# Patient Record
Sex: FEMALE | Race: ASIAN | NOT HISPANIC OR LATINO
[De-identification: names, ages, dates, MRNs, and addresses within clinical notes are randomized per-mention and may not be internally consistent; named-entity substitution may affect disease eponyms.]

---

## 2019-04-24 ENCOUNTER — APPOINTMENT (OUTPATIENT)
Dept: VASCULAR SURGERY | Facility: CLINIC | Age: 74
End: 2019-04-24
Payer: MEDICARE

## 2019-04-24 VITALS
TEMPERATURE: 97.9 F | DIASTOLIC BLOOD PRESSURE: 78 MMHG | WEIGHT: 188 LBS | HEART RATE: 76 BPM | HEIGHT: 66 IN | SYSTOLIC BLOOD PRESSURE: 165 MMHG | BODY MASS INDEX: 30.22 KG/M2

## 2019-04-24 DIAGNOSIS — Z82.49 FAMILY HISTORY OF ISCHEMIC HEART DISEASE AND OTHER DISEASES OF THE CIRCULATORY SYSTEM: ICD-10-CM

## 2019-04-24 DIAGNOSIS — Z87.09 PERSONAL HISTORY OF OTHER DISEASES OF THE RESPIRATORY SYSTEM: ICD-10-CM

## 2019-04-24 DIAGNOSIS — I83.90 ASYMPTOMATIC VARICOSE VEINS OF UNSPECIFIED LOWER EXTREMITY: ICD-10-CM

## 2019-04-24 DIAGNOSIS — Z86.39 PERSONAL HISTORY OF OTHER ENDOCRINE, NUTRITIONAL AND METABOLIC DISEASE: ICD-10-CM

## 2019-04-24 DIAGNOSIS — Z87.19 PERSONAL HISTORY OF OTHER DISEASES OF THE DIGESTIVE SYSTEM: ICD-10-CM

## 2019-04-24 DIAGNOSIS — Z86.79 PERSONAL HISTORY OF OTHER DISEASES OF THE CIRCULATORY SYSTEM: ICD-10-CM

## 2019-04-24 PROCEDURE — 99203 OFFICE O/P NEW LOW 30 MIN: CPT

## 2019-04-24 PROCEDURE — 93970 EXTREMITY STUDY: CPT

## 2019-04-24 RX ORDER — MOMETASONE FUROATE 1 MG/G
0.1 CREAM TOPICAL
Qty: 45 | Refills: 0 | Status: ACTIVE | COMMUNITY
Start: 2018-12-27

## 2019-04-24 RX ORDER — MIRTAZAPINE 15 MG/1
15 TABLET, FILM COATED ORAL
Qty: 30 | Refills: 0 | Status: ACTIVE | COMMUNITY
Start: 2018-12-27

## 2019-04-24 RX ORDER — DIPHENHYDRAMINE HYDROCHLORIDE 25 MG/1
25 CAPSULE ORAL
Qty: 30 | Refills: 0 | Status: ACTIVE | COMMUNITY
Start: 2018-12-27

## 2019-04-24 RX ORDER — NIFEDIPINE 90 MG/1
90 TABLET, EXTENDED RELEASE ORAL
Qty: 30 | Refills: 0 | Status: ACTIVE | COMMUNITY
Start: 2018-11-14

## 2019-04-24 RX ORDER — POLYVINYL ALCOHOL 14 MG/ML
1.4 SOLUTION/ DROPS OPHTHALMIC
Qty: 15 | Refills: 0 | Status: ACTIVE | COMMUNITY
Start: 2018-11-26

## 2019-04-24 RX ORDER — MULTIVITAMIN WITH FOLIC ACID 400 MCG
TABLET ORAL
Qty: 30 | Refills: 0 | Status: ACTIVE | COMMUNITY
Start: 2018-11-14

## 2019-04-24 RX ORDER — HYDROCORTISONE 1 %
12 CREAM (GRAM) TOPICAL
Qty: 400 | Refills: 0 | Status: ACTIVE | COMMUNITY
Start: 2018-11-27

## 2019-04-24 RX ORDER — DOCUSATE SODIUM 100 MG/1
100 CAPSULE ORAL
Qty: 270 | Refills: 0 | Status: ACTIVE | COMMUNITY
Start: 2018-11-27

## 2019-04-24 RX ORDER — MONTELUKAST 10 MG/1
10 TABLET, FILM COATED ORAL
Qty: 30 | Refills: 0 | Status: ACTIVE | COMMUNITY
Start: 2018-11-14

## 2019-04-24 RX ORDER — POTASSIUM CHLORIDE 750 MG/1
10 TABLET, EXTENDED RELEASE ORAL
Qty: 24 | Refills: 0 | Status: ACTIVE | COMMUNITY
Start: 2019-01-09

## 2019-04-24 RX ORDER — MELOXICAM 7.5 MG/1
7.5 TABLET ORAL
Qty: 30 | Refills: 0 | Status: ACTIVE | COMMUNITY
Start: 2019-01-10

## 2019-04-24 RX ORDER — MECLIZINE HYDROCHLORIDE 12.5 MG/1
12.5 TABLET ORAL
Qty: 30 | Refills: 0 | Status: ACTIVE | COMMUNITY
Start: 2018-12-27

## 2019-04-24 RX ORDER — LOSARTAN POTASSIUM 25 MG/1
25 TABLET, FILM COATED ORAL
Qty: 30 | Refills: 0 | Status: ACTIVE | COMMUNITY
Start: 2018-11-14

## 2019-04-24 RX ORDER — RISPERIDONE 0.5 MG/1
0.5 TABLET, FILM COATED ORAL
Qty: 30 | Refills: 0 | Status: ACTIVE | COMMUNITY
Start: 2018-11-27

## 2019-04-24 RX ORDER — ACETAMINOPHEN 500 MG/1
500 TABLET, FILM COATED ORAL
Qty: 100 | Refills: 0 | Status: ACTIVE | COMMUNITY
Start: 2018-11-14

## 2019-04-24 RX ORDER — FLUTICASONE PROPIONATE 44 UG/1
44 AEROSOL, METERED RESPIRATORY (INHALATION)
Qty: 11 | Refills: 0 | Status: ACTIVE | COMMUNITY
Start: 2019-01-16

## 2019-04-24 RX ORDER — ATORVASTATIN CALCIUM 10 MG/1
10 TABLET, FILM COATED ORAL
Qty: 30 | Refills: 0 | Status: ACTIVE | COMMUNITY
Start: 2018-11-14

## 2019-04-24 RX ORDER — RANITIDINE 150 MG/1
150 TABLET ORAL
Qty: 180 | Refills: 0 | Status: ACTIVE | COMMUNITY
Start: 2019-01-09

## 2019-04-24 RX ORDER — LORATADINE 10 MG/1
10 TABLET ORAL
Qty: 30 | Refills: 0 | Status: ACTIVE | COMMUNITY
Start: 2018-11-14

## 2019-04-24 RX ORDER — PAROXETINE HYDROCHLORIDE 10 MG/1
10 TABLET, FILM COATED ORAL
Qty: 30 | Refills: 0 | Status: ACTIVE | COMMUNITY
Start: 2018-11-27

## 2019-04-24 RX ORDER — ALBUTEROL SULFATE 90 UG/1
108 (90 BASE) AEROSOL, METERED RESPIRATORY (INHALATION)
Qty: 18 | Refills: 0 | Status: ACTIVE | COMMUNITY
Start: 2018-11-27

## 2019-04-24 NOTE — ASSESSMENT
[FreeTextEntry1] : bl LE pain \par no sign or symptoms of PVD\par pt. has mild venous insufficiency but her symptoms are not consistent with venous insufficiency.\par pt. was referred to orthopedic for further eval \par LE elevation and compression with 20-30mmHG compression stockings for VI\par no need for ablation at this time

## 2019-04-24 NOTE — HISTORY OF PRESENT ILLNESS
[FreeTextEntry1] : 74yo F presents c/o bl LE pain from hips down. Pain is worse with walking. When she gets up from standing she has hard time to start walking because of pain. Denies calf claudication or rest pain. No swelling. Pt. has h/o bl venous ablation.

## 2019-04-24 NOTE — PHYSICAL EXAM
[JVD] : no jugular venous distention  [2+] : left 2+ [Ankle Swelling Bilaterally] : bilaterally  [Ankle Swelling (On Exam)] : not present [Varicose Veins Of Lower Extremities] : bilaterally [Ankle Swelling On The Left] : moderate [] : not present [Abdomen Tenderness] : ~T ~M No abdominal tenderness [Alert] : alert [No Rash or Lesion] : No rash or lesion [Oriented to Time] : oriented to time [Oriented to Person] : oriented to person [Oriented to Place] : oriented to place [Calm] : calm [de-identified] : NAD

## 2019-04-24 NOTE — REASON FOR VISIT
[Consultation] : a consultation visit [Family Member] : family member [FreeTextEntry1] : bl LE pain

## 2019-05-09 ENCOUNTER — APPOINTMENT (OUTPATIENT)
Dept: ORTHOPEDIC SURGERY | Facility: CLINIC | Age: 74
End: 2019-05-09
Payer: MEDICARE

## 2019-05-09 VITALS
DIASTOLIC BLOOD PRESSURE: 77 MMHG | WEIGHT: 192 LBS | HEIGHT: 66 IN | BODY MASS INDEX: 30.86 KG/M2 | HEART RATE: 78 BPM | SYSTOLIC BLOOD PRESSURE: 135 MMHG

## 2019-05-09 DIAGNOSIS — M54.5 LOW BACK PAIN: ICD-10-CM

## 2019-05-09 DIAGNOSIS — M47.816 SPONDYLOSIS W/OUT MYELOPATHY OR RADICULOPATHY, LUMBAR REGION: ICD-10-CM

## 2019-05-09 PROCEDURE — 73562 X-RAY EXAM OF KNEE 3: CPT | Mod: 50

## 2019-05-09 PROCEDURE — 72170 X-RAY EXAM OF PELVIS: CPT

## 2019-05-09 PROCEDURE — 99204 OFFICE O/P NEW MOD 45 MIN: CPT

## 2019-05-09 PROCEDURE — 72100 X-RAY EXAM L-S SPINE 2/3 VWS: CPT

## 2019-05-09 NOTE — REVIEW OF SYSTEMS
[Joint Pain] : joint pain [Joint Stiffness] : joint stiffness [Arthralgia] : arthralgia [Joint Swelling] : joint swelling [Negative] : Heme/Lymph

## 2019-05-09 NOTE — CONSULT LETTER
[Dear  ___] : Dear  [unfilled], [Consult Letter:] : I had the pleasure of evaluating your patient, [unfilled]. [Please see my note below.] : Please see my note below. [Consult Closing:] : Thank you very much for allowing me to participate in the care of this patient.  If you have any questions, please do not hesitate to contact me. [Sincerely,] : Sincerely, [FreeTextEntry3] : Pola Larsen MD\par \par ______________________________________________\par [facility]: Hip/Knee Arthroplasty\par [address] [facility], [facility] [address], [place] [address]\par (t) [phone]\par (f) [phone]  [FreeTextEntry2] : KONRAD ROBLES \par

## 2019-05-09 NOTE — HISTORY OF PRESENT ILLNESS
[7] : a current pain level of 7/10 [de-identified] : Ms. DAPHNE JORGENSEN is a 73 year old female presenting with bilateral knee pain, now worsening. She localizes the pain to the medial and anterior aspect of the bilateral knee. The patient describes the pain as sharp, and states it is intermittent, based on activity. She states the pain is exacerbated by walking long distance, climbing/descending stairs, and rising from a seated position. She has been taking NSAIDs for pain with only mild temporary relief. She admits to prior conservative management inclusive of physical therapy with only mild improvement in condition. Patient had cortisone injections years ago with no improvement. She admits/denies hip or lower back pain. The patient admits to limitations in there quality of life, and is present to discuss options for treatment.

## 2019-05-09 NOTE — PHYSICAL EXAM
[Antalgic] : antalgic [Knee] : patellar 2+ and symmetric bilaterally [LE] : Sensory: Intact in bilateral lower extremities [Ankle] : ankle 2+ and symmetric bilaterally [DP] : dorsalis pedis 2+ and symmetric bilaterally [PT] : posterior tibial 2+ and symmetric bilaterally [de-identified] : On general examination the patient is adequately groomed and nourished. The vital parameters are as recorded. \par There is no lymphedema or diffuse swelling, no varicose veins, no skin warmth/erythema/scars/swelling, no ulcers and no palpable lymph nodes or masses in both lower extremities. Bilateral pedal pulses are well palpable.\par Upper Extremity:\par Both right and left upper extremities are unremarkable in terms of skin rash, lesions, pigmentation, redness, tenderness, swelling, joint instability, abnormal deformity or crepitus. The overall range of motion, sensation, motor tone and strength testing are normal.\par \par Knee Exam:\par The gait is right stiff knee antalgic.\par Knee alignment:            Right 10 degrees varus with mild flexion deformity. \par Left 5 degrees varus with no flexion deformity.\par Both knees demonstrate no scars and the skin has no warmth, erythema, swelling or tenderness. \par Both knees have a range of motion of\par Extension:                    Right -5 degrees                        Left 0 degrees\par Flexion:                                   Right 100 degrees          Left 100 degrees\par Right Knee: There is medial joint line tenderness. There is mild effusion. \par Alena's test is positive. Miah test is positive.\par Lachman's test, Anterior/Posterior Drawer test and Pivot Shift Tests are negative. \par There is right knee grade 1 MCL mediolateral laxity and no anteroposterior instability. \par Patella compression test is negative and patellofemoral tracking is normal with no lateral subluxation, apprehension or instability. \par Right knee quadriceps and hamstrings power is 4+.\par Left knee quadriceps and hamstrings power is 5. \par \par Hip Exam:\par The gait and station is normal\par The patient has equal leg lengths and no pelvic tilt. Timoteo/Jillian test is 7 inches on the right and 7 inches on the left. Active SLR is 60 degrees on the right and 60 degrees on the left. Both hips demonstrate no scars and the skin has no signs of inflammation or tenderness. \par Both Hips have a normal range of motion of flexion to 100 degrees, abduction 40 degrees, adduction 20 degrees, external rotation 40 degrees, internal rotation 20 degrees with symmetrical motion in flexion and extension. There is no flexion contracture, deformity or instability. Labral impingement tests are negative.\par Both hips flexor, abductor and extensor power is normal. [de-identified] : The following radiographs were ordered and read by me during this patients visit. I reviewed each radiograph in detail with the patient and discussed the findings as highlighted below. \par AP, lateral and skyline views of the bilateral knees confirm advanced degenerative joint disease with medial joint space narrowing and osteophyte formation. Varus Deformity. Right worse than left. \par AP view of the pelvis are within normal limits \par AP and lateral views of the lumbar spine reveal DJD with loss of normal lordosis\par

## 2019-05-09 NOTE — DISCUSSION/SUMMARY
[de-identified] : Bilateral Knee advanced degenerative joint disease, right worse than left, Varus Deformity BIlaterally. Lumbar DJD. Varicose Veins \par The natural history and treatment of degenerative arthritis was discussed with the patient at length today. The spectrum of treatment including nonoperative modalities to surgical intervention was elucidated. Noninvasive and nonoperative treatment modalities include weight reduction, activity modification with low impact exercise,  as needed use of acetaminophen or anti-inflammatory medications if tolerated, glucosamine/chondroitin supplements, and physical therapy. Further treatments can include corticosteroid injection and the use of viscosupplementation with hyaluronic acid injections. Definitive surgical treatment can certainly include total joint arthroplasty also. The risks and benefits of each treatment options was discussed and all questions were answered.\par In view of lack of adequate pain relief with conservative (non-surgical) management protocol including physical therapy, home exercises, weight loss, activity modification, NSAIDS; the patient is recommended to consider a Right Total Knee Replacement. \par The risks, benefits, alternatives, implications, complications including but not limited to pain, stiffness, bleeding, limp, wound breakdown, infection, bone fracture, nerve and vascular compromise, implant wear, fixation options depending on bone quality, instability, and durability issues and rehabilitation were discussed and relevant questions were addressed. The possibility of recurrent pain, no improvement in pain and actual worsening of the pain were also mentioned in conversation with the patient. Medical complications related to the patient's general medical health including deep vein thrombosis, pulmonary embolus, heart attack, stroke, death and other complications from anesthesia were discussed as well. The patient wishes to proceed and will undergo preoperative medical evaluation and clearance, attend the preoperative educational class and will schedule surgery appropriately.\par Anticoagulation prophylaxis medication options to address risks of deep vein thrombosis and pulmonary embolism were discussed and weighed against the risks of bleeding and wound healing complications. The patient elected aspirin/coumadin prophylaxis with mechanical modalities. \par I have provided the patient a referral to Dr. Grey for evaluation of PVD with Varicose Veins.

## 2019-06-28 ENCOUNTER — APPOINTMENT (OUTPATIENT)
Dept: VASCULAR SURGERY | Facility: CLINIC | Age: 74
End: 2019-06-28
Payer: MEDICARE

## 2019-06-28 VITALS
HEIGHT: 66 IN | TEMPERATURE: 98.1 F | BODY MASS INDEX: 30.53 KG/M2 | HEART RATE: 2 BPM | DIASTOLIC BLOOD PRESSURE: 84 MMHG | WEIGHT: 190 LBS | SYSTOLIC BLOOD PRESSURE: 145 MMHG

## 2019-06-28 DIAGNOSIS — I87.2 VENOUS INSUFFICIENCY (CHRONIC) (PERIPHERAL): ICD-10-CM

## 2019-06-28 DIAGNOSIS — I83.819 VARICOSE VEINS OF UNSPECIFIED LOWER EXTREMITY WITH PAIN: ICD-10-CM

## 2019-06-28 DIAGNOSIS — I83.899 VARICOSE VEINS OF UNSPECIFIED LOWER EXTREMITY WITH OTHER COMPLICATIONS: ICD-10-CM

## 2019-06-28 DIAGNOSIS — I83.893 VARICOSE VEINS OF BILATERAL LOWER EXTREMITIES WITH OTHER COMPLICATIONS: ICD-10-CM

## 2019-06-28 PROCEDURE — 99214 OFFICE O/P EST MOD 30 MIN: CPT

## 2019-06-28 PROCEDURE — 93970 EXTREMITY STUDY: CPT

## 2019-06-28 RX ORDER — ACETAMINOPHEN 500 MG/1
TABLET ORAL
Refills: 0 | Status: ACTIVE | COMMUNITY

## 2019-06-28 RX ORDER — SOLIFENACIN SUCCINATE 10 MG/1
TABLET ORAL
Refills: 0 | Status: ACTIVE | COMMUNITY

## 2019-06-28 RX ORDER — ESTRADIOL 0.1 MG/G
0.1 CREAM VAGINAL
Qty: 42 | Refills: 0 | Status: ACTIVE | COMMUNITY
Start: 2019-04-03

## 2019-06-28 RX ORDER — SENNOSIDES 8.6 MG/1
CAPSULE, GELATIN COATED ORAL
Refills: 0 | Status: ACTIVE | COMMUNITY

## 2019-06-28 RX ORDER — OFLOXACIN 3 MG/ML
0.3 SOLUTION/ DROPS OPHTHALMIC
Qty: 5 | Refills: 0 | Status: ACTIVE | COMMUNITY
Start: 2019-01-24

## 2019-06-28 RX ORDER — FLUTICASONE PROPIONATE 50 UG/1
50 SPRAY, METERED NASAL
Qty: 48 | Refills: 0 | Status: ACTIVE | COMMUNITY
Start: 2019-03-13

## 2019-06-28 RX ORDER — DOCUSATE SODIUM 100 MG/1
CAPSULE ORAL
Refills: 0 | Status: ACTIVE | COMMUNITY

## 2019-06-28 RX ORDER — RISPERIDONE 1 MG/1
1 TABLET, FILM COATED ORAL
Qty: 30 | Refills: 0 | Status: ACTIVE | COMMUNITY
Start: 2019-02-04

## 2019-06-28 RX ORDER — DICLOFENAC SODIUM 10 MG/G
1 GEL TOPICAL
Qty: 100 | Refills: 0 | Status: ACTIVE | COMMUNITY
Start: 2019-06-12

## 2019-06-28 RX ORDER — PREDNISOLONE ACETATE 10 MG/ML
1 SUSPENSION/ DROPS OPHTHALMIC
Qty: 5 | Refills: 0 | Status: ACTIVE | COMMUNITY
Start: 2019-01-24

## 2019-06-28 RX ORDER — ALBUTEROL 90 MCG
AEROSOL (GRAM) INHALATION
Refills: 0 | Status: ACTIVE | COMMUNITY

## 2019-06-28 RX ORDER — HYDROCORTISONE 25 MG/G
CREAM TOPICAL
Refills: 0 | Status: ACTIVE | COMMUNITY

## 2019-06-28 RX ORDER — PANTOPRAZOLE 40 MG/1
TABLET, DELAYED RELEASE ORAL
Refills: 0 | Status: ACTIVE | COMMUNITY

## 2019-06-28 RX ORDER — CALCIUM CITRATE/VITAMIN D3 315MG-6.25
TABLET ORAL
Refills: 0 | Status: ACTIVE | COMMUNITY

## 2019-06-28 RX ORDER — ASPIRIN 81 MG
81 TABLET, DELAYED RELEASE (ENTERIC COATED) ORAL
Refills: 0 | Status: ACTIVE | COMMUNITY

## 2019-06-28 RX ORDER — BUPROPION HYDROCHLORIDE 150 MG/1
150 TABLET, EXTENDED RELEASE ORAL
Refills: 0 | Status: ACTIVE | COMMUNITY

## 2019-06-28 RX ORDER — NITROFURANTOIN (MONOHYDRATE/MACROCRYSTALS) 25; 75 MG/1; MG/1
100 CAPSULE ORAL
Qty: 30 | Refills: 0 | Status: ACTIVE | COMMUNITY
Start: 2019-02-27

## 2019-06-28 NOTE — ASSESSMENT
[FreeTextEntry1] : Impression  symptomatic venous insuff not yet sono evident and rosalinda knee OA \par \par \par Plan Med Conservative management leg elevation, knee high compression stockings 15-20mm Hg (rx given), wt loss, diet control, exercise program, protective measures\par Indications risks and benefits of Right  LSV  RFAwere explained to pt who understands\par From a vasc standpoint I recommend to proceed w knee ortho intervention and then rto to be re eval 2-3 mo\par if at that time her venous insuff  remain and are affecting her lifestyle then will d/w pt rt lsv rfa\par pt is cleared from vasc standpoint for ortho interv \par ov 2-3mo \par \par \par \par Varicose veins are enlarged, twisted veins. Varicose veins are caused by increased blood pressure in the veins.  The blood moves towards the heart by 1-way valves in the veins. When the valves become weakened or damaged, blood can collect in the veins and pool in your lower legs (ankles). This causes the veins to become enlarged and incompetent with reflux. Sitting or standing for long periods can cause blood to pool in the leg veins, increasing the pressure within the veins. \par Risk factors for varicose veins or venous disease may include:  obesity, older age, standing or sitting for prolonged periods of time for several years, being female, pregnancy, taking oral contraceptive pills or hormone replacement, being inactive, and/or smoking. \par The most common symptoms of varicose veins are sensations in the legs, such as a heavy feeling, burning, and/or aching. However, each individual may experience symptoms differently.  Other symptoms may include:  color changes in the skin, sores on the legs, or rash.  Severe varicose veins or venous disease may eventually produce long-term mild swelling that can result in more serious skin and tissue problems, such as ulcers and non-healing sores.\par Varicose veins and venous disease are diagnosed by a complete medical history, physical examination, and diagnostic studies for varicose veins including duplex ultrasound and color-flow imaging.  \par Medical treatment for varicose veins and venous disease include:  compression stockings, sclerotherapy, endovenous ablation and/or surgical treatment with microphlebectomy.  \par \par  [Arterial/Venous Disease] : arterial/venous disease [Foot care/Footwear] : foot care/footwear [Other: _____] : [unfilled]

## 2019-06-28 NOTE — HISTORY OF PRESENT ILLNESS
[FreeTextEntry1] : pt was referred by Dr JAYE Larsen MD for vasc clearance prior to  knee replacement  \par pt has not decided weather she will proceed w ortho intervention\par pt is partially compliant w comp stockings\par \par Pt c/o bilateral leg pain swelling heaviness and discomfort worse w activity and by the end of the day\par Onset sev years ago \par Intensity moderate \par \par \par Pt c/o bilateral right worse than the left knee pain w activity \par intensity mod to severe w difficulty  getting up and sitting \par \par \par \par

## 2019-06-28 NOTE — PHYSICAL EXAM
[Normal Breath Sounds] : Normal breath sounds [1+] : left 1+ [2+] : left 2+ [Ankle Swelling (On Exam)] : present [Ankle Swelling Bilaterally] : bilaterally  [Varicose Veins Of Lower Extremities] : bilaterally [Ankle Swelling On The Right] : mild [] : bilaterally [Ankle Swelling On The Left] : moderate [No HSM] : no hepatosplenomegaly [No Rash or Lesion] : No rash or lesion [Alert] : alert [Oriented to Person] : oriented to person [Oriented to Place] : oriented to place [Oriented to Time] : oriented to time [Calm] : calm [JVD] : no jugular venous distention  [Right Carotid Bruit] : no bruit heard over the right carotid [Left Carotid Bruit] : no bruit heard over the left carotid [Abdomen Masses] : No abdominal masses [Tender] : was nontender [Stool Sample Taken] : No stool obtained  on rectal exam [de-identified] : nad [de-identified] : wnl [FreeTextEntry1] : Moderate bilateral leg venous insufficiency \par w mild to  moderate bilateral leg stasis dermatitis \par and moderate bilateral leg edema \par Multiple  bilateral leg small varicose  veins and spider veins  ant  thigh and calf and shin \par no wounds/ulcers\par  [de-identified] : wnl [de-identified] : ambulates slowly w gracia  [de-identified] : Horacio Cranial nerves 2-12 horacio grossly intact [de-identified] : cooperative

## 2019-06-28 NOTE — DATA REVIEWED
[FreeTextEntry1] : 3/24/2019 venous Doppler Horacio le no acute dvt svt\par                                  RLE GSV insuff knee level to ankle, LSV insuff spj to ankle\par                                  LLE no sono evidence of reflux \par \par \par 6/28/2019 Venous Doppler Horacio le no acute dvt svt \par                           \par

## 2019-11-21 ENCOUNTER — APPOINTMENT (OUTPATIENT)
Dept: ORTHOPEDIC SURGERY | Facility: CLINIC | Age: 74
End: 2019-11-21
Payer: MEDICARE

## 2019-11-21 VITALS — HEART RATE: 79 BPM | SYSTOLIC BLOOD PRESSURE: 138 MMHG | DIASTOLIC BLOOD PRESSURE: 82 MMHG

## 2019-11-21 PROCEDURE — 73562 X-RAY EXAM OF KNEE 3: CPT | Mod: 50

## 2019-11-21 PROCEDURE — 99214 OFFICE O/P EST MOD 30 MIN: CPT

## 2019-11-21 NOTE — CONSULT LETTER
[Dear  ___] : Dear  [unfilled], [Consult Letter:] : I had the pleasure of evaluating your patient, [unfilled]. [Consult Closing:] : Thank you very much for allowing me to participate in the care of this patient.  If you have any questions, please do not hesitate to contact me. [Sincerely,] : Sincerely, [FreeTextEntry2] : KONRAD ROBLES \par  [FreeTextEntry1] : Bilateral Knee advanced degenerative joint disease, Varus Deformity BIlaterally. Lumbar DJD. Varicose Veins \par The natural history and treatment of degenerative arthritis was discussed with the patient at length today. The spectrum of treatment including nonoperative modalities to surgical intervention was elucidated. Noninvasive and nonoperative treatment modalities include weight reduction, activity modification with low impact exercise,  as needed use of acetaminophen or anti-inflammatory medications if tolerated, glucosamine/chondroitin supplements, and physical therapy. Further treatments can include corticosteroid injection and the use of viscosupplementation with hyaluronic acid injections. Definitive surgical treatment can certainly include total joint arthroplasty also. The risks and benefits of each treatment options was discussed and all questions were answered.\par In view of lack of adequate pain relief with conservative (non-surgical) management protocol including physical therapy, home exercises, weight loss, activity modification, NSAIDS; the patient is recommended to consider a Left Total Knee Replacement. \par The risks, benefits, alternatives, implications, complications including but not limited to pain, stiffness, bleeding, limp, wound breakdown, infection, bone fracture, nerve and vascular compromise, implant wear, fixation options depending on bone quality, instability, and durability issues and rehabilitation were discussed and relevant questions were addressed. The possibility of recurrent pain, no improvement in pain and actual worsening of the pain were also mentioned in conversation with the patient. Medical complications related to the patient's general medical health including deep vein thrombosis, pulmonary embolus, heart attack, stroke, death and other complications from anesthesia were discussed as well. The patient wishes to proceed and will undergo preoperative medical evaluation and clearance, attend the preoperative educational class and will schedule surgery appropriately.\par Anticoagulation prophylaxis medication options to address risks of deep vein thrombosis and pulmonary embolism were discussed and weighed against the risks of bleeding and wound healing complications. The patient elected aspirin/coumadin prophylaxis with mechanical modalities. \par In terms of Varicose Veins, patient cleared for orthopedic intervention by Dr Grey.  [FreeTextEntry3] : Pola Larsen MD\par \par ______________________________________________\par [facility]: Hip/Knee Arthroplasty\par [address] [facility], [facility] [address], [place] [address]\par (t) [phone]\par (f) [phone]

## 2019-11-21 NOTE — DISCUSSION/SUMMARY
[de-identified] : Bilateral Knee advanced degenerative joint disease, Varus Deformity BIlaterally. Lumbar DJD. Varicose Veins \par The natural history and treatment of degenerative arthritis was discussed with the patient at length today. The spectrum of treatment including nonoperative modalities to surgical intervention was elucidated. Noninvasive and nonoperative treatment modalities include weight reduction, activity modification with low impact exercise,  as needed use of acetaminophen or anti-inflammatory medications if tolerated, glucosamine/chondroitin supplements, and physical therapy. Further treatments can include corticosteroid injection and the use of viscosupplementation with hyaluronic acid injections. Definitive surgical treatment can certainly include total joint arthroplasty also. The risks and benefits of each treatment options was discussed and all questions were answered.\par In view of lack of adequate pain relief with conservative (non-surgical) management protocol including physical therapy, home exercises, weight loss, activity modification, NSAIDS; the patient is recommended to consider a Left Total Knee Replacement. \par The risks, benefits, alternatives, implications, complications including but not limited to pain, stiffness, bleeding, limp, wound breakdown, infection, bone fracture, nerve and vascular compromise, implant wear, fixation options depending on bone quality, instability, and durability issues and rehabilitation were discussed and relevant questions were addressed. The possibility of recurrent pain, no improvement in pain and actual worsening of the pain were also mentioned in conversation with the patient. Medical complications related to the patient's general medical health including deep vein thrombosis, pulmonary embolus, heart attack, stroke, death and other complications from anesthesia were discussed as well. The patient wishes to proceed and will undergo preoperative medical evaluation and clearance, attend the preoperative educational class and will schedule surgery appropriately.\par Anticoagulation prophylaxis medication options to address risks of deep vein thrombosis and pulmonary embolism were discussed and weighed against the risks of bleeding and wound healing complications. The patient elected aspirin/coumadin prophylaxis with mechanical modalities. \par In terms of Varicose Veins, patient cleared for orthopedic intervention by Dr Grey.

## 2019-11-21 NOTE — HISTORY OF PRESENT ILLNESS
[8] : an average pain level of 8/10 [de-identified] : Ms. DAPHNE JORGENSEN is a 73 year old female presenting for follow up of bilateral Knee advanced degenerative joint disease. She localizes the pain to the medial and anterior aspect of the bilateral knee. The patient describes the pain as sharp, and states it is intermittent, based on activity. She states the pain is exacerbated by walking long distance, climbing/descending stairs, and rising from a seated position. She has been taking NSAIDs for pain with only mild temporary relief. She admits to prior conservative management inclusive of physical therapy with only mild improvement in condition. Patient had cortisone injections years ago with no improvement. She denies hip or lower back pain. The patient admits to limitations in there quality of life, and is present to discuss total knee replacement. JTM \par Patient admits she is minimally ambulatory with a walker, but often uses a wheel chair. \par Patient was evaluated by Dr Grey for Varicose Veins and was cleared for surgery.

## 2019-11-21 NOTE — PHYSICAL EXAM
[Antalgic] : antalgic [LE] : Sensory: Intact in bilateral lower extremities [Knee] : patellar 2+ and symmetric bilaterally [Ankle] : ankle 2+ and symmetric bilaterally [DP] : dorsalis pedis 2+ and symmetric bilaterally [PT] : posterior tibial 2+ and symmetric bilaterally [de-identified] : On general examination the patient is adequately groomed and nourished. The vital parameters are as recorded. \par There is no lymphedema or diffuse swelling, no varicose veins, no skin warmth/erythema/scars/swelling, no ulcers and no palpable lymph nodes or masses in both lower extremities. Bilateral pedal pulses are well palpable.\par Upper Extremity:\par Both right and left upper extremities are unremarkable in terms of skin rash, lesions, pigmentation, redness, tenderness, swelling, joint instability, abnormal deformity or crepitus. The overall range of motion, sensation, motor tone and strength testing are normal.\par \par Knee Exam:\par The gait is right stiff knee antalgic.\par Knee alignment:            Right 10 degrees varus with mild flexion deformity. \par Left 10 degrees varus with no flexion deformity.\par Both knees demonstrate no scars and the skin has no warmth, erythema, swelling or tenderness. \par Both knees have a range of motion of\par Extension:                    Right -5 degrees                        Left 0 degrees\par Flexion:                                   Right 100 degrees          Left 90 degrees\par Right Knee: There is medial joint line tenderness. There is mild effusion. \par Alena's test is positive. Miah test is positive.\par Lachman's test, Anterior/Posterior Drawer test and Pivot Shift Tests are negative. \par There is right knee grade 1 MCL mediolateral laxity and no anteroposterior instability. \par Patella compression test is negative and patellofemoral tracking is normal with no lateral subluxation, apprehension or instability. \par Right knee quadriceps and hamstrings power is 4+.\par Left knee quadriceps and hamstrings power is 4+ \par \par Hip Exam:\par The gait and station is normal\par The patient has equal leg lengths and no pelvic tilt. Timoteo/Jillian test is 7 inches on the right and 7 inches on the left. Active SLR is 60 degrees on the right and 60 degrees on the left. Both hips demonstrate no scars and the skin has no signs of inflammation or tenderness. \par Both Hips have a normal range of motion of flexion to 100 degrees, abduction 40 degrees, adduction 20 degrees, external rotation 40 degrees, internal rotation 20 degrees with symmetrical motion in flexion and extension. There is no flexion contracture, deformity or instability. Labral impingement tests are negative.\par Both hips flexor, abductor and extensor power is normal. [de-identified] : The following radiographs were ordered and read by me during this patients visit. I reviewed each radiograph in detail with the patient and discussed the findings as highlighted below. \par AP, lateral and skyline views of the bilateral knees confirm advanced degenerative joint disease with medial joint space narrowing and osteophyte formation. Varus Deformity. \par Previous Xrays: \par AP view of the pelvis are within normal limits \par AP and lateral views of the lumbar spine reveal DJD with loss of normal lordosis\par

## 2019-12-31 ENCOUNTER — OUTPATIENT (OUTPATIENT)
Dept: OUTPATIENT SERVICES | Facility: HOSPITAL | Age: 74
LOS: 1 days | End: 2019-12-31

## 2019-12-31 VITALS
DIASTOLIC BLOOD PRESSURE: 78 MMHG | HEIGHT: 63.75 IN | WEIGHT: 188.05 LBS | RESPIRATION RATE: 16 BRPM | OXYGEN SATURATION: 98 % | TEMPERATURE: 97 F | SYSTOLIC BLOOD PRESSURE: 128 MMHG | HEART RATE: 83 BPM

## 2019-12-31 DIAGNOSIS — Z98.51 TUBAL LIGATION STATUS: Chronic | ICD-10-CM

## 2019-12-31 DIAGNOSIS — M17.12 UNILATERAL PRIMARY OSTEOARTHRITIS, LEFT KNEE: ICD-10-CM

## 2019-12-31 LAB
ALBUMIN SERPL ELPH-MCNC: 4.8 G/DL — SIGNIFICANT CHANGE UP (ref 3.3–5)
ALP SERPL-CCNC: 43 U/L — SIGNIFICANT CHANGE UP (ref 40–120)
ALT FLD-CCNC: 17 U/L — SIGNIFICANT CHANGE UP (ref 4–33)
ANION GAP SERPL CALC-SCNC: 15 MMO/L — HIGH (ref 7–14)
APPEARANCE UR: CLEAR — SIGNIFICANT CHANGE UP
AST SERPL-CCNC: 21 U/L — SIGNIFICANT CHANGE UP (ref 4–32)
BASOPHILS # BLD AUTO: 0.02 K/UL — SIGNIFICANT CHANGE UP (ref 0–0.2)
BASOPHILS NFR BLD AUTO: 0.3 % — SIGNIFICANT CHANGE UP (ref 0–2)
BILIRUB DIRECT SERPL-MCNC: < 0.2 MG/DL — SIGNIFICANT CHANGE UP (ref 0.1–0.2)
BILIRUB SERPL-MCNC: 0.4 MG/DL — SIGNIFICANT CHANGE UP (ref 0.2–1.2)
BILIRUB UR-MCNC: NEGATIVE — SIGNIFICANT CHANGE UP
BLD GP AB SCN SERPL QL: NEGATIVE — SIGNIFICANT CHANGE UP
BLOOD UR QL VISUAL: NEGATIVE — SIGNIFICANT CHANGE UP
BUN SERPL-MCNC: 24 MG/DL — HIGH (ref 7–23)
CALCIUM SERPL-MCNC: 10.1 MG/DL — SIGNIFICANT CHANGE UP (ref 8.4–10.5)
CHLORIDE SERPL-SCNC: 100 MMOL/L — SIGNIFICANT CHANGE UP (ref 98–107)
CO2 SERPL-SCNC: 22 MMOL/L — SIGNIFICANT CHANGE UP (ref 22–31)
COLOR SPEC: SIGNIFICANT CHANGE UP
CREAT SERPL-MCNC: 0.57 MG/DL — SIGNIFICANT CHANGE UP (ref 0.5–1.3)
EOSINOPHIL # BLD AUTO: 0.18 K/UL — SIGNIFICANT CHANGE UP (ref 0–0.5)
EOSINOPHIL NFR BLD AUTO: 3.1 % — SIGNIFICANT CHANGE UP (ref 0–6)
GLUCOSE SERPL-MCNC: 127 MG/DL — HIGH (ref 70–99)
GLUCOSE UR-MCNC: NEGATIVE — SIGNIFICANT CHANGE UP
HBA1C BLD-MCNC: 5.9 % — HIGH (ref 4–5.6)
HCT VFR BLD CALC: 37 % — SIGNIFICANT CHANGE UP (ref 34.5–45)
HGB BLD-MCNC: 11.8 G/DL — SIGNIFICANT CHANGE UP (ref 11.5–15.5)
IMM GRANULOCYTES NFR BLD AUTO: 0.3 % — SIGNIFICANT CHANGE UP (ref 0–1.5)
KETONES UR-MCNC: NEGATIVE — SIGNIFICANT CHANGE UP
LEUKOCYTE ESTERASE UR-ACNC: NEGATIVE — SIGNIFICANT CHANGE UP
LYMPHOCYTES # BLD AUTO: 2.92 K/UL — SIGNIFICANT CHANGE UP (ref 1–3.3)
LYMPHOCYTES # BLD AUTO: 51 % — HIGH (ref 13–44)
MCHC RBC-ENTMCNC: 29.2 PG — SIGNIFICANT CHANGE UP (ref 27–34)
MCHC RBC-ENTMCNC: 31.9 % — LOW (ref 32–36)
MCV RBC AUTO: 91.6 FL — SIGNIFICANT CHANGE UP (ref 80–100)
MONOCYTES # BLD AUTO: 0.36 K/UL — SIGNIFICANT CHANGE UP (ref 0–0.9)
MONOCYTES NFR BLD AUTO: 6.3 % — SIGNIFICANT CHANGE UP (ref 2–14)
NEUTROPHILS # BLD AUTO: 2.23 K/UL — SIGNIFICANT CHANGE UP (ref 1.8–7.4)
NEUTROPHILS NFR BLD AUTO: 39 % — LOW (ref 43–77)
NITRITE UR-MCNC: NEGATIVE — SIGNIFICANT CHANGE UP
NRBC # FLD: 0 K/UL — SIGNIFICANT CHANGE UP (ref 0–0)
PH UR: 6.5 — SIGNIFICANT CHANGE UP (ref 5–8)
PLATELET # BLD AUTO: 253 K/UL — SIGNIFICANT CHANGE UP (ref 150–400)
PMV BLD: 9.6 FL — SIGNIFICANT CHANGE UP (ref 7–13)
POTASSIUM SERPL-MCNC: 4.2 MMOL/L — SIGNIFICANT CHANGE UP (ref 3.5–5.3)
POTASSIUM SERPL-SCNC: 4.2 MMOL/L — SIGNIFICANT CHANGE UP (ref 3.5–5.3)
PROT SERPL-MCNC: 7.8 G/DL — SIGNIFICANT CHANGE UP (ref 6–8.3)
PROT UR-MCNC: NEGATIVE — SIGNIFICANT CHANGE UP
RBC # BLD: 4.04 M/UL — SIGNIFICANT CHANGE UP (ref 3.8–5.2)
RBC # FLD: 14 % — SIGNIFICANT CHANGE UP (ref 10.3–14.5)
RH IG SCN BLD-IMP: POSITIVE — SIGNIFICANT CHANGE UP
SODIUM SERPL-SCNC: 137 MMOL/L — SIGNIFICANT CHANGE UP (ref 135–145)
SP GR SPEC: 1.01 — SIGNIFICANT CHANGE UP (ref 1–1.04)
UROBILINOGEN FLD QL: NORMAL — SIGNIFICANT CHANGE UP
WBC # BLD: 5.73 K/UL — SIGNIFICANT CHANGE UP (ref 3.8–10.5)
WBC # FLD AUTO: 5.73 K/UL — SIGNIFICANT CHANGE UP (ref 3.8–10.5)

## 2019-12-31 RX ORDER — MONTELUKAST 4 MG/1
1 TABLET, CHEWABLE ORAL
Qty: 0 | Refills: 0 | DISCHARGE

## 2019-12-31 RX ORDER — ACETAMINOPHEN 500 MG
2 TABLET ORAL
Qty: 0 | Refills: 0 | DISCHARGE

## 2019-12-31 RX ORDER — HYDROCORTISONE 1 %
1 OINTMENT (GRAM) TOPICAL
Qty: 0 | Refills: 0 | DISCHARGE

## 2019-12-31 RX ORDER — SODIUM CHLORIDE 9 MG/ML
3 INJECTION INTRAMUSCULAR; INTRAVENOUS; SUBCUTANEOUS EVERY 8 HOURS
Refills: 0 | Status: DISCONTINUED | OUTPATIENT
Start: 2020-01-15 | End: 2020-01-17

## 2019-12-31 RX ORDER — ALBUTEROL 90 UG/1
2 AEROSOL, METERED ORAL
Qty: 0 | Refills: 0 | DISCHARGE

## 2019-12-31 RX ORDER — MECLIZINE HCL 12.5 MG
1 TABLET ORAL
Qty: 0 | Refills: 0 | DISCHARGE

## 2019-12-31 RX ORDER — CHOLECALCIFEROL (VITAMIN D3) 125 MCG
1 CAPSULE ORAL
Qty: 0 | Refills: 0 | DISCHARGE

## 2019-12-31 RX ORDER — SODIUM CHLORIDE 9 MG/ML
1000 INJECTION, SOLUTION INTRAVENOUS
Refills: 0 | Status: DISCONTINUED | OUTPATIENT
Start: 2020-01-15 | End: 2020-01-15

## 2019-12-31 NOTE — H&P PST ADULT - MUSCULOSKELETAL
details… detailed exam left knee/no joint erythema/no joint warmth/diminished strength/decreased ROM due to pain

## 2019-12-31 NOTE — H&P PST ADULT - HISTORY OF PRESENT ILLNESS
74 year old female with c/o worsening left knee pain x several years. Pt presents today for presurgical evaluation for ... 74 year old female with c/o worsening left knee arthritis and pain x several years. Pt presents today for presurgical evaluation for Left Total Knee Replacement scheduled on 1/15/20.

## 2019-12-31 NOTE — H&P PST ADULT - NSICDXPROBLEM_GEN_ALL_CORE_FT
PROBLEM DIAGNOSES  Problem: Unilateral primary osteoarthritis, left knee  Assessment and Plan: Pt scheduled for surgery on 1/15/20.  Pre-op instructions provided. Pt verbalized understanding.   Pepcid provided for GI prophylaxis.   Pt given detailed verbal and written instructions on chlorhexidine wash. Pt verbalized understanding with teachback.   Pt is going for medical clearance per surgeon's request - requested by PST as well due to low activity tolerance.   Pt instructed to take her Nifedepine and Flonase the morning of surgery.

## 2019-12-31 NOTE — H&P PST ADULT - MS GEN HX ROS MEA POS PC
arthritis/left shoulder, left hip, right knee left shoulder, left hip, right knee, left knee/joint pain/arthritis

## 2019-12-31 NOTE — H&P PST ADULT - NSICDXPASTMEDICALHX_GEN_ALL_CORE_FT
PAST MEDICAL HISTORY:  Asthma     Essential hypertension     H/O gastroesophageal reflux (GERD)     Hyperlipidemia     Vertigo PAST MEDICAL HISTORY:  Asthma     Depression     Essential hypertension     H/O gastroesophageal reflux (GERD)     Hyperlipidemia     Obesity     Vertigo

## 2019-12-31 NOTE — H&P PST ADULT - NSICDXFAMILYHX_GEN_ALL_CORE_FT
FAMILY HISTORY:  Mother  Still living? Unknown  FH: HTN (hypertension), Age at diagnosis: Age Unknown

## 2020-01-01 LAB
BACTERIA UR CULT: SIGNIFICANT CHANGE UP
SPECIMEN SOURCE: SIGNIFICANT CHANGE UP
SPECIMEN SOURCE: SIGNIFICANT CHANGE UP

## 2020-01-02 PROBLEM — R42 DIZZINESS AND GIDDINESS: Chronic | Status: ACTIVE | Noted: 2019-12-31

## 2020-01-02 PROBLEM — Z87.19 PERSONAL HISTORY OF OTHER DISEASES OF THE DIGESTIVE SYSTEM: Chronic | Status: ACTIVE | Noted: 2019-12-31

## 2020-01-02 PROBLEM — E66.9 OBESITY, UNSPECIFIED: Chronic | Status: ACTIVE | Noted: 2019-12-31

## 2020-01-02 PROBLEM — F32.9 MAJOR DEPRESSIVE DISORDER, SINGLE EPISODE, UNSPECIFIED: Chronic | Status: ACTIVE | Noted: 2019-12-31

## 2020-01-02 PROBLEM — E78.5 HYPERLIPIDEMIA, UNSPECIFIED: Chronic | Status: ACTIVE | Noted: 2019-12-31

## 2020-01-03 LAB — BACTERIA NPH CULT: SIGNIFICANT CHANGE UP

## 2020-01-07 ENCOUNTER — OTHER (OUTPATIENT)
Age: 75
End: 2020-01-07

## 2020-01-07 RX ORDER — MUPIROCIN 20 MG/G
2 OINTMENT TOPICAL
Qty: 1 | Refills: 0 | Status: ACTIVE | COMMUNITY
Start: 2020-01-07 | End: 1900-01-01

## 2020-01-09 ENCOUNTER — OTHER (OUTPATIENT)
Age: 75
End: 2020-01-09

## 2020-01-14 ENCOUNTER — TRANSCRIPTION ENCOUNTER (OUTPATIENT)
Age: 75
End: 2020-01-14

## 2020-01-14 NOTE — ASU PATIENT PROFILE, ADULT - PMH
Asthma    Depression    Essential hypertension    H/O gastroesophageal reflux (GERD)    Hyperlipidemia    Obesity    Vertigo

## 2020-01-15 ENCOUNTER — RESULT REVIEW (OUTPATIENT)
Age: 75
End: 2020-01-15

## 2020-01-15 ENCOUNTER — APPOINTMENT (OUTPATIENT)
Dept: ORTHOPEDIC SURGERY | Facility: HOSPITAL | Age: 75
End: 2020-01-15

## 2020-01-15 ENCOUNTER — INPATIENT (INPATIENT)
Facility: HOSPITAL | Age: 75
LOS: 1 days | Discharge: HOME HEALTH | End: 2020-01-17
Attending: ORTHOPAEDIC SURGERY | Admitting: ORTHOPAEDIC SURGERY
Payer: MEDICARE

## 2020-01-15 VITALS
HEIGHT: 63.75 IN | TEMPERATURE: 97 F | SYSTOLIC BLOOD PRESSURE: 138 MMHG | HEART RATE: 70 BPM | RESPIRATION RATE: 16 BRPM | DIASTOLIC BLOOD PRESSURE: 70 MMHG | WEIGHT: 188.05 LBS | OXYGEN SATURATION: 100 %

## 2020-01-15 DIAGNOSIS — Z98.51 TUBAL LIGATION STATUS: Chronic | ICD-10-CM

## 2020-01-15 DIAGNOSIS — M17.12 UNILATERAL PRIMARY OSTEOARTHRITIS, LEFT KNEE: ICD-10-CM

## 2020-01-15 LAB
ANION GAP SERPL CALC-SCNC: 17 MMO/L — HIGH (ref 7–14)
BUN SERPL-MCNC: 22 MG/DL — SIGNIFICANT CHANGE UP (ref 7–23)
CALCIUM SERPL-MCNC: 9 MG/DL — SIGNIFICANT CHANGE UP (ref 8.4–10.5)
CHLORIDE SERPL-SCNC: 99 MMOL/L — SIGNIFICANT CHANGE UP (ref 98–107)
CO2 SERPL-SCNC: 20 MMOL/L — LOW (ref 22–31)
CREAT SERPL-MCNC: 0.56 MG/DL — SIGNIFICANT CHANGE UP (ref 0.5–1.3)
GLUCOSE BLDC GLUCOMTR-MCNC: 77 MG/DL — SIGNIFICANT CHANGE UP (ref 70–99)
GLUCOSE SERPL-MCNC: 197 MG/DL — HIGH (ref 70–99)
HCT VFR BLD CALC: 33.1 % — LOW (ref 34.5–45)
HGB BLD-MCNC: 10.6 G/DL — LOW (ref 11.5–15.5)
MCHC RBC-ENTMCNC: 29.1 PG — SIGNIFICANT CHANGE UP (ref 27–34)
MCHC RBC-ENTMCNC: 32 % — SIGNIFICANT CHANGE UP (ref 32–36)
MCV RBC AUTO: 90.9 FL — SIGNIFICANT CHANGE UP (ref 80–100)
NRBC # FLD: 0 K/UL — SIGNIFICANT CHANGE UP (ref 0–0)
PLATELET # BLD AUTO: 236 K/UL — SIGNIFICANT CHANGE UP (ref 150–400)
PMV BLD: 9 FL — SIGNIFICANT CHANGE UP (ref 7–13)
POTASSIUM SERPL-MCNC: 3.4 MMOL/L — LOW (ref 3.5–5.3)
POTASSIUM SERPL-SCNC: 3.4 MMOL/L — LOW (ref 3.5–5.3)
RBC # BLD: 3.64 M/UL — LOW (ref 3.8–5.2)
RBC # FLD: 14.2 % — SIGNIFICANT CHANGE UP (ref 10.3–14.5)
RH IG SCN BLD-IMP: POSITIVE — SIGNIFICANT CHANGE UP
SODIUM SERPL-SCNC: 136 MMOL/L — SIGNIFICANT CHANGE UP (ref 135–145)
WBC # BLD: 6.44 K/UL — SIGNIFICANT CHANGE UP (ref 3.8–10.5)
WBC # FLD AUTO: 6.44 K/UL — SIGNIFICANT CHANGE UP (ref 3.8–10.5)

## 2020-01-15 PROCEDURE — 27447 TOTAL KNEE ARTHROPLASTY: CPT | Mod: LT

## 2020-01-15 PROCEDURE — 88311 DECALCIFY TISSUE: CPT | Mod: 26

## 2020-01-15 PROCEDURE — 88305 TISSUE EXAM BY PATHOLOGIST: CPT | Mod: 26

## 2020-01-15 PROCEDURE — 73560 X-RAY EXAM OF KNEE 1 OR 2: CPT | Mod: 26,LT

## 2020-01-15 RX ORDER — ACETAMINOPHEN 500 MG
975 TABLET ORAL ONCE
Refills: 0 | Status: COMPLETED | OUTPATIENT
Start: 2020-01-15 | End: 2020-01-15

## 2020-01-15 RX ORDER — SODIUM CHLORIDE 9 MG/ML
1000 INJECTION INTRAMUSCULAR; INTRAVENOUS; SUBCUTANEOUS ONCE
Refills: 0 | Status: COMPLETED | OUTPATIENT
Start: 2020-01-16 | End: 2020-01-16

## 2020-01-15 RX ORDER — DEXAMETHASONE 0.5 MG/5ML
10 ELIXIR ORAL ONCE
Refills: 0 | Status: COMPLETED | OUTPATIENT
Start: 2020-01-15 | End: 2020-01-15

## 2020-01-15 RX ORDER — DEXAMETHASONE 0.5 MG/5ML
10 ELIXIR ORAL ONCE
Refills: 0 | Status: COMPLETED | OUTPATIENT
Start: 2020-01-16 | End: 2020-01-16

## 2020-01-15 RX ORDER — OXYCODONE HYDROCHLORIDE 5 MG/1
2.5 TABLET ORAL EVERY 6 HOURS
Refills: 0 | Status: DISCONTINUED | OUTPATIENT
Start: 2020-01-15 | End: 2020-01-17

## 2020-01-15 RX ORDER — SENNA PLUS 8.6 MG/1
2 TABLET ORAL AT BEDTIME
Refills: 0 | Status: DISCONTINUED | OUTPATIENT
Start: 2020-01-15 | End: 2020-01-17

## 2020-01-15 RX ORDER — BUDESONIDE AND FORMOTEROL FUMARATE DIHYDRATE 160; 4.5 UG/1; UG/1
2 AEROSOL RESPIRATORY (INHALATION)
Refills: 0 | Status: DISCONTINUED | OUTPATIENT
Start: 2020-01-15 | End: 2020-01-17

## 2020-01-15 RX ORDER — MECLIZINE HCL 12.5 MG
12.5 TABLET ORAL THREE TIMES A DAY
Refills: 0 | Status: DISCONTINUED | OUTPATIENT
Start: 2020-01-15 | End: 2020-01-17

## 2020-01-15 RX ORDER — LOSARTAN POTASSIUM 100 MG/1
100 TABLET, FILM COATED ORAL DAILY
Refills: 0 | Status: DISCONTINUED | OUTPATIENT
Start: 2020-01-15 | End: 2020-01-17

## 2020-01-15 RX ORDER — LORATADINE 10 MG/1
10 TABLET ORAL DAILY
Refills: 0 | Status: DISCONTINUED | OUTPATIENT
Start: 2020-01-15 | End: 2020-01-17

## 2020-01-15 RX ORDER — PANTOPRAZOLE SODIUM 20 MG/1
40 TABLET, DELAYED RELEASE ORAL ONCE
Refills: 0 | Status: COMPLETED | OUTPATIENT
Start: 2020-01-15 | End: 2020-01-15

## 2020-01-15 RX ORDER — TRAMADOL HYDROCHLORIDE 50 MG/1
50 TABLET ORAL EVERY 8 HOURS
Refills: 0 | Status: DISCONTINUED | OUTPATIENT
Start: 2020-01-15 | End: 2020-01-15

## 2020-01-15 RX ORDER — MONTELUKAST 4 MG/1
10 TABLET, CHEWABLE ORAL AT BEDTIME
Refills: 0 | Status: DISCONTINUED | OUTPATIENT
Start: 2020-01-15 | End: 2020-01-17

## 2020-01-15 RX ORDER — HYDROMORPHONE HYDROCHLORIDE 2 MG/ML
0.5 INJECTION INTRAMUSCULAR; INTRAVENOUS; SUBCUTANEOUS EVERY 4 HOURS
Refills: 0 | Status: DISCONTINUED | OUTPATIENT
Start: 2020-01-15 | End: 2020-01-17

## 2020-01-15 RX ORDER — BUPROPION HYDROCHLORIDE 150 MG/1
150 TABLET, EXTENDED RELEASE ORAL DAILY
Refills: 0 | Status: DISCONTINUED | OUTPATIENT
Start: 2020-01-15 | End: 2020-01-17

## 2020-01-15 RX ORDER — ALBUTEROL 90 UG/1
2 AEROSOL, METERED ORAL EVERY 6 HOURS
Refills: 0 | Status: DISCONTINUED | OUTPATIENT
Start: 2020-01-15 | End: 2020-01-17

## 2020-01-15 RX ORDER — ACETAMINOPHEN 500 MG
975 TABLET ORAL EVERY 8 HOURS
Refills: 0 | Status: DISCONTINUED | OUTPATIENT
Start: 2020-01-15 | End: 2020-01-17

## 2020-01-15 RX ORDER — ONDANSETRON 8 MG/1
4 TABLET, FILM COATED ORAL EVERY 6 HOURS
Refills: 0 | Status: DISCONTINUED | OUTPATIENT
Start: 2020-01-15 | End: 2020-01-17

## 2020-01-15 RX ORDER — TRAMADOL HYDROCHLORIDE 50 MG/1
50 TABLET ORAL ONCE
Refills: 0 | Status: DISCONTINUED | OUTPATIENT
Start: 2020-01-15 | End: 2020-01-15

## 2020-01-15 RX ORDER — SODIUM CHLORIDE 9 MG/ML
1000 INJECTION INTRAMUSCULAR; INTRAVENOUS; SUBCUTANEOUS ONCE
Refills: 0 | Status: COMPLETED | OUTPATIENT
Start: 2020-01-15 | End: 2020-01-15

## 2020-01-15 RX ORDER — ATORVASTATIN CALCIUM 80 MG/1
10 TABLET, FILM COATED ORAL AT BEDTIME
Refills: 0 | Status: DISCONTINUED | OUTPATIENT
Start: 2020-01-15 | End: 2020-01-17

## 2020-01-15 RX ORDER — CEFAZOLIN SODIUM 1 G
2000 VIAL (EA) INJECTION EVERY 8 HOURS
Refills: 0 | Status: COMPLETED | OUTPATIENT
Start: 2020-01-15 | End: 2020-01-16

## 2020-01-15 RX ORDER — SODIUM CHLORIDE 9 MG/ML
1000 INJECTION, SOLUTION INTRAVENOUS
Refills: 0 | Status: DISCONTINUED | OUTPATIENT
Start: 2020-01-15 | End: 2020-01-17

## 2020-01-15 RX ORDER — PANTOPRAZOLE SODIUM 20 MG/1
40 TABLET, DELAYED RELEASE ORAL
Refills: 0 | Status: DISCONTINUED | OUTPATIENT
Start: 2020-01-15 | End: 2020-01-17

## 2020-01-15 RX ORDER — FLUTICASONE PROPIONATE 50 MCG
2 SPRAY, SUSPENSION NASAL DAILY
Refills: 0 | Status: DISCONTINUED | OUTPATIENT
Start: 2020-01-15 | End: 2020-01-17

## 2020-01-15 RX ORDER — OXYCODONE HYDROCHLORIDE 5 MG/1
5 TABLET ORAL ONCE
Refills: 0 | Status: DISCONTINUED | OUTPATIENT
Start: 2020-01-15 | End: 2020-01-15

## 2020-01-15 RX ORDER — FENTANYL CITRATE 50 UG/ML
25 INJECTION INTRAVENOUS
Refills: 0 | Status: DISCONTINUED | OUTPATIENT
Start: 2020-01-15 | End: 2020-01-15

## 2020-01-15 RX ORDER — NIFEDIPINE 30 MG
90 TABLET, EXTENDED RELEASE 24 HR ORAL DAILY
Refills: 0 | Status: DISCONTINUED | OUTPATIENT
Start: 2020-01-15 | End: 2020-01-17

## 2020-01-15 RX ORDER — OXYBUTYNIN CHLORIDE 5 MG
10 TABLET ORAL
Refills: 0 | Status: DISCONTINUED | OUTPATIENT
Start: 2020-01-15 | End: 2020-01-17

## 2020-01-15 RX ORDER — ASPIRIN/CALCIUM CARB/MAGNESIUM 324 MG
325 TABLET ORAL
Refills: 0 | Status: DISCONTINUED | OUTPATIENT
Start: 2020-01-15 | End: 2020-01-17

## 2020-01-15 RX ORDER — TRAMADOL HYDROCHLORIDE 50 MG/1
50 TABLET ORAL EVERY 8 HOURS
Refills: 0 | Status: DISCONTINUED | OUTPATIENT
Start: 2020-01-15 | End: 2020-01-17

## 2020-01-15 RX ORDER — OXYCODONE HYDROCHLORIDE 5 MG/1
5 TABLET ORAL EVERY 4 HOURS
Refills: 0 | Status: DISCONTINUED | OUTPATIENT
Start: 2020-01-15 | End: 2020-01-17

## 2020-01-15 RX ORDER — OXYCODONE HYDROCHLORIDE 5 MG/1
10 TABLET ORAL EVERY 4 HOURS
Refills: 0 | Status: DISCONTINUED | OUTPATIENT
Start: 2020-01-15 | End: 2020-01-17

## 2020-01-15 RX ORDER — ONDANSETRON 8 MG/1
4 TABLET, FILM COATED ORAL ONCE
Refills: 0 | Status: DISCONTINUED | OUTPATIENT
Start: 2020-01-15 | End: 2020-01-15

## 2020-01-15 RX ORDER — HYDROMORPHONE HYDROCHLORIDE 2 MG/ML
0.5 INJECTION INTRAMUSCULAR; INTRAVENOUS; SUBCUTANEOUS
Refills: 0 | Status: DISCONTINUED | OUTPATIENT
Start: 2020-01-15 | End: 2020-01-15

## 2020-01-15 RX ORDER — DIPHENHYDRAMINE HCL 50 MG
50 CAPSULE ORAL AT BEDTIME
Refills: 0 | Status: DISCONTINUED | OUTPATIENT
Start: 2020-01-15 | End: 2020-01-17

## 2020-01-15 RX ORDER — POLYETHYLENE GLYCOL 3350 17 G/17G
17 POWDER, FOR SOLUTION ORAL DAILY
Refills: 0 | Status: DISCONTINUED | OUTPATIENT
Start: 2020-01-15 | End: 2020-01-17

## 2020-01-15 RX ADMIN — Medication 75 MILLIGRAM(S): at 21:34

## 2020-01-15 RX ADMIN — SODIUM CHLORIDE 30 MILLILITER(S): 9 INJECTION, SOLUTION INTRAVENOUS at 09:35

## 2020-01-15 RX ADMIN — Medication 102 MILLIGRAM(S): at 21:34

## 2020-01-15 RX ADMIN — Medication 975 MILLIGRAM(S): at 21:34

## 2020-01-15 RX ADMIN — Medication 975 MILLIGRAM(S): at 16:15

## 2020-01-15 RX ADMIN — Medication 975 MILLIGRAM(S): at 16:45

## 2020-01-15 RX ADMIN — FENTANYL CITRATE 25 MICROGRAM(S): 50 INJECTION INTRAVENOUS at 13:10

## 2020-01-15 RX ADMIN — FENTANYL CITRATE 25 MICROGRAM(S): 50 INJECTION INTRAVENOUS at 15:29

## 2020-01-15 RX ADMIN — Medication 10 MILLIGRAM(S): at 17:35

## 2020-01-15 RX ADMIN — FENTANYL CITRATE 25 MICROGRAM(S): 50 INJECTION INTRAVENOUS at 13:50

## 2020-01-15 RX ADMIN — PANTOPRAZOLE SODIUM 40 MILLIGRAM(S): 20 TABLET, DELAYED RELEASE ORAL at 09:35

## 2020-01-15 RX ADMIN — HYDROMORPHONE HYDROCHLORIDE 0.5 MILLIGRAM(S): 2 INJECTION INTRAMUSCULAR; INTRAVENOUS; SUBCUTANEOUS at 13:15

## 2020-01-15 RX ADMIN — Medication 75 MILLIGRAM(S): at 09:35

## 2020-01-15 RX ADMIN — Medication 975 MILLIGRAM(S): at 09:35

## 2020-01-15 RX ADMIN — FENTANYL CITRATE 25 MICROGRAM(S): 50 INJECTION INTRAVENOUS at 14:00

## 2020-01-15 RX ADMIN — SODIUM CHLORIDE 1000 MILLILITER(S): 9 INJECTION INTRAMUSCULAR; INTRAVENOUS; SUBCUTANEOUS at 17:34

## 2020-01-15 RX ADMIN — FENTANYL CITRATE 25 MICROGRAM(S): 50 INJECTION INTRAVENOUS at 12:52

## 2020-01-15 RX ADMIN — FENTANYL CITRATE 25 MICROGRAM(S): 50 INJECTION INTRAVENOUS at 12:58

## 2020-01-15 RX ADMIN — HYDROMORPHONE HYDROCHLORIDE 0.5 MILLIGRAM(S): 2 INJECTION INTRAMUSCULAR; INTRAVENOUS; SUBCUTANEOUS at 13:30

## 2020-01-15 RX ADMIN — FENTANYL CITRATE 25 MICROGRAM(S): 50 INJECTION INTRAVENOUS at 15:45

## 2020-01-15 RX ADMIN — FENTANYL CITRATE 25 MICROGRAM(S): 50 INJECTION INTRAVENOUS at 12:57

## 2020-01-15 RX ADMIN — Medication 100 MILLIGRAM(S): at 19:07

## 2020-01-15 RX ADMIN — Medication 325 MILLIGRAM(S): at 17:34

## 2020-01-15 RX ADMIN — TRAMADOL HYDROCHLORIDE 50 MILLIGRAM(S): 50 TABLET ORAL at 09:35

## 2020-01-15 NOTE — OCCUPATIONAL THERAPY INITIAL EVALUATION ADULT - PERTINENT HX OF CURRENT PROBLEM, REHAB EVAL
74 year old female with c/o worsening left knee arthritis and pain x several years. Pt is now s/p Left Total Knee Replacement on 1/15/20.

## 2020-01-15 NOTE — OCCUPATIONAL THERAPY INITIAL EVALUATION ADULT - LEVEL OF INDEPENDENCE: SIT/SUPINE, REHAB EVAL
Held at this time due to pt c/o nausea and spitting up. BP: 109/54 mmHg. HR: 74 bpm. O2: 98% on room air

## 2020-01-15 NOTE — PHYSICAL THERAPY INITIAL EVALUATION ADULT - LEVEL OF INDEPENDENCE: SUPINE/SIT, REHAB EVAL
Attempted to transfer however pt c/o nausea. SL=358/54 mmHg, HR=74 bpm, SpO2= 98%. RN Heidi DE LA ROSA present & aware

## 2020-01-15 NOTE — PHYSICAL THERAPY INITIAL EVALUATION ADULT - RANGE OF MOTION EXAMINATION, REHAB EVAL
Right LE ROM was WFL (within functional limits)/Left knee active ROM 0-25 degrees, Left ankle ROM WFL/bilateral upper extremity ROM was WFL (within functional limits)

## 2020-01-15 NOTE — CONSULT NOTE ADULT - ASSESSMENT
Patient is a 74y old  Female who presents with a chief complaint of "I am having knee surgery" (31 Dec 2019 10:11).    S/p Lt TKR:  WBAT  Pain control - Adequate.  DVT prophylaxis  BID  Bowel regimen.  Ortho f/up noted.    COPD:  Pul status stable  Albuterol MDI/Symbicort  Singular    HTN:  Cw losartan/Nifedipine  BP stable

## 2020-01-15 NOTE — OCCUPATIONAL THERAPY INITIAL EVALUATION ADULT - LIVES WITH, PROFILE
Pt reports living alone in a private house with steps to manage. Flight of stairs up to 2nd floor. Pt reports having a home health aide 10 hours/day 7 days/week to assist with ADLs and IADLs as needed. Pt has a tub shower with shower chair prior to admission.

## 2020-01-15 NOTE — OCCUPATIONAL THERAPY INITIAL EVALUATION ADULT - PLANNED THERAPY INTERVENTIONS, OT EVAL
ADL retraining/balance training/bed mobility training/neuromuscular re-education/strengthening/ROM/transfer training

## 2020-01-15 NOTE — OCCUPATIONAL THERAPY INITIAL EVALUATION ADULT - GENERAL OBSERVATIONS, REHAB EVAL
Pt received semisupine on stretcher on 9T in NAD. + IV. + left knee ace wrap. Per RN Heidi, pt okay to participate in OT evaluation.

## 2020-01-15 NOTE — PHYSICAL THERAPY INITIAL EVALUATION ADULT - PATIENT PROFILE REVIEW, REHAB EVAL
yes/PT orders received: ambulate as tolerated. Consult with RN Heidi DE LA ROSA, pt may participate in PT evaluation.

## 2020-01-15 NOTE — PHYSICAL THERAPY INITIAL EVALUATION ADULT - PERTINENT HX OF CURRENT PROBLEM, REHAB EVAL
Patient is a 74 year old female admitted to [facility] on 1/15 with c/o worsening left knee arthritis and pain for several years. Pt now s/p Left TKR on 1/15/20.

## 2020-01-15 NOTE — OCCUPATIONAL THERAPY INITIAL EVALUATION ADULT - DIAGNOSIS, OT EVAL
Patient Education   Fat and Cholesterol Restricted Diet  High levels of fat and cholesterol in your blood may lead to various health problems, such as diseases of the heart, blood vessels, gallbladder, liver, and pancreas. Fats are concentrated sources of energy that come in various forms. Certain types of fat, including saturated fat, may be harmful in excess. Cholesterol is a substance needed by your body in small amounts. Your body makes all the cholesterol it needs. Excess cholesterol comes from the food you eat.  When you have high levels of cholesterol and saturated fat in your blood, health problems can develop because the excess fat and cholesterol will gather along the walls of your blood vessels, causing them to narrow. Choosing the right foods will help you control your intake of fat and cholesterol. This will help keep the levels of these substances in your blood within normal limits and reduce your risk of disease.  What is my plan?  Your health care provider recommends that you:  · Limit your fat intake to ______% or less of your total calories per day.  · Limit the amount of cholesterol in your diet to less than _________mg per day.  · Eat 20-30 grams of fiber each day.  What types of fat should I choose?  · Choose healthy fats more often. Choose monounsaturated and polyunsaturated fats, such as olive and canola oil, flaxseeds, walnuts, almonds, and seeds.  · Eat more omega-3 fats. Good choices include salmon, mackerel, sardines, tuna, flaxseed oil, and ground flaxseeds. Aim to eat fish at least two times a week.  · Limit saturated fats. Saturated fats are primarily found in animal products, such as meats, butter, and cream. Plant sources of saturated fats include palm oil, palm kernel oil, and coconut oil.  · Avoid foods with partially hydrogenated oils in them. These contain trans fats. Examples of foods that contain trans fats are stick margarine, some tub margarines, cookies, crackers, and other  "baked goods.  What general guidelines do I need to follow?  These guidelines for healthy eating will help you control your intake of fat and cholesterol:  · Check food labels carefully to identify foods with trans fats or high amounts of saturated fat.  · Fill one half of your plate with vegetables and green salads.  · Fill one fourth of your plate with whole grains. Look for the word \"whole\" as the first word in the ingredient list.  · Fill one fourth of your plate with lean protein foods.  · Limit fruit to two servings a day. Choose fruit instead of juice.  · Eat more foods that contain fiber, such as apples, broccoli, carrots, beans, peas, and barley.  · Eat more home-cooked food and less restaurant, buffet, and fast food.  · Limit or avoid alcohol.  · Limit foods high in starch and sugar.  · Limit fried foods.  · Cook foods using methods other than frying. Baking, boiling, grilling, and broiling are all great options.  · Lose weight if you are overweight. Losing just 5-10% of your initial body weight can help your overall health and prevent diseases such as diabetes and heart disease.  What foods can I eat?  Grains  · Whole grains, such as whole wheat or whole grain breads, crackers, cereals, and pasta. Unsweetened oatmeal, bulgur, barley, quinoa, or brown rice. Corn or whole wheat flour tortillas.  Vegetables  · Fresh or frozen vegetables (raw, steamed, roasted, or grilled). Green salads.  Fruits  · All fresh, canned (in natural juice), or frozen fruits.  Meats and other protein foods  · Ground beef (85% or leaner), grass-fed beef, or beef trimmed of fat. Skinless chicken or turkey. Ground chicken or turkey. Pork trimmed of fat. All fish and seafood. Eggs. Dried beans, peas, or lentils. Unsalted nuts or seeds. Unsalted canned or dry beans.  Dairy  · Low-fat dairy products, such as skim or 1% milk, 2% or reduced-fat cheeses, low-fat ricotta or cottage cheese, or plain low-fat yo  Fats and oils  · Tub margarines " without trans fats. Light or reduced-fat mayonnaise and salad dressings. Avocado. Olive, canola, sesame, or safflower oils. Natural peanut or almond butter (choose ones without added sugar and oil).  The items listed above may not be a complete list of recommended foods or beverages. Contact your dietitian for more options.  Foods to avoid  Grains  · White bread. White pasta. White rice. Cornbread. Bagels, pastries, and croissants. Crackers that contain trans fat.  Vegetables  · White potatoes. Corn. Creamed or fried vegetables. Vegetables in a cheese sauce.  Fruits  · Dried fruits. Canned fruit in light or heavy syrup. Fruit juice.  Meats and other protein foods  · Fatty cuts of meat. Ribs, chicken wings, wylie, sausage, bologna, salami, chitterlings, fatback, hot dogs, bratwurst, and packaged luncheon meats. Liver and organ meats.  Dairy  · Whole or 2% milk, cream, half-and-half, and cream cheese. Whole milk cheeses. Whole-fat or sweetened yogurt. Full-fat cheeses. Nondairy creamers and whipped toppings. Processed cheese, cheese spreads, or cheese curds.  Beverages  · Alcohol. Sweetened drinks (such as sodas, lemonade, and fruit drinks or punches).  Fats and oils  · Butter, stick margarine, lard, shortening, ghee, or wylie fat. Coconut, palm kernel, or palm oils.  Sweets and desserts  · Corn syrup, sugars, honey, and molasses. Candy. Jam and jelly. Syrup. Sweetened cereals. Cookies, pies, cakes, donuts, muffins, and ice cream.  The items listed above may not be a complete list of foods and beverages to avoid. Contact your dietitian for more information.  This information is not intended to replace advice given to you by your health care provider. Make sure you discuss any questions you have with your health care provider.  Document Released: 12/18/2006 Document Revised: 01/08/2016 Document Reviewed: 03/18/2015  [employer] Patient Education © 2017 [employer]        s/p left TKR; decreased functional mobility, decreased ADL performance

## 2020-01-15 NOTE — CONSULT NOTE ADULT - SUBJECTIVE AND OBJECTIVE BOX
Patient is a 74y old  Female who presents with a chief complaint of "I am having knee surgery" (31 Dec 2019 10:11)      HPI:  74 year old female with c/o worsening left knee arthritis and pain x several years. Pt presents today for presurgical evaluation for Left Total Knee Replacement scheduled on 1/15/20. (31 Dec 2019 10:11)      PAST MEDICAL & SURGICAL HISTORY:  Obesity  Depression  H/O gastroesophageal reflux (GERD)  Vertigo  Hyperlipidemia  Asthma  Essential hypertension  H/O tubal ligation      Review of Systems:   CONSTITUTIONAL: No fever,  or fatigue  RESPIRATORY: No cough, wheezing, chills or hemoptysis; No shortness of breath  CARDIOVASCULAR: No chest pain, palpitations, dizziness, or leg swelling  GASTROINTESTINAL: No abdominal or epigastric pain. No nausea, vomiting, or hematemesis; No diarrhea or constipation.   NEUROLOGICAL: No headaches,           Allergies    No Known Allergies    Intolerances        Social History:     FAMILY HISTORY:  FH: HTN (hypertension) (Mother)      MEDICATIONS  (STANDING):  acetaminophen   Tablet .. 975 milliGRAM(s) Oral every 8 hours  aspirin enteric coated 325 milliGRAM(s) Oral two times a day  atorvastatin 10 milliGRAM(s) Oral at bedtime  budesonide  80 MICROgram(s)/formoterol 4.5 MICROgram(s) Inhaler 2 Puff(s) Inhalation two times a day  buPROPion XL . 150 milliGRAM(s) Oral daily  ceFAZolin   IVPB 2000 milliGRAM(s) IV Intermittent every 8 hours  dexAMETHasone  IVPB 10 milliGRAM(s) IV Intermittent once  diphenhydrAMINE 50 milliGRAM(s) Oral at bedtime  fluticasone propionate 50 MICROgram(s)/spray Nasal Spray 2 Spray(s) Both Nostrils daily  lactated ringers. 1000 milliLiter(s) (150 mL/Hr) IV Continuous <Continuous>  loratadine 10 milliGRAM(s) Oral daily  losartan 100 milliGRAM(s) Oral daily  meclizine 12.5 milliGRAM(s) Oral three times a day  montelukast 10 milliGRAM(s) Oral at bedtime  NIFEdipine XL 90 milliGRAM(s) Oral daily  oxybutynin 10 milliGRAM(s) Oral two times a day  pantoprazole    Tablet 40 milliGRAM(s) Oral before breakfast  polyethylene glycol 3350 17 Gram(s) Oral daily  pregabalin 75 milliGRAM(s) Oral two times a day  senna 2 Tablet(s) Oral at bedtime  sodium chloride 0.9% Bolus 1000 milliLiter(s) IV Bolus once  sodium chloride 0.9% lock flush 3 milliLiter(s) IV Push every 8 hours  traMADol 50 milliGRAM(s) Oral every 8 hours    MEDICATIONS  (PRN):  ALBUTerol    90 MICROgram(s) HFA Inhaler 2 Puff(s) Inhalation every 6 hours PRN Shortness of Breath  aluminum hydroxide/magnesium hydroxide/simethicone Suspension 30 milliLiter(s) Oral four times a day PRN Indigestion  HYDROmorphone  Injectable 0.5 milliGRAM(s) IV Push every 4 hours PRN breakthrough pain  ondansetron Injectable 4 milliGRAM(s) IV Push every 6 hours PRN Nausea and/or Vomiting  oxyCODONE    IR 5 milliGRAM(s) Oral every 4 hours PRN Moderate Pain (4 - 6)  oxyCODONE    IR 10 milliGRAM(s) Oral every 4 hours PRN Severe Pain (7 - 10)  oxyCODONE    IR 2.5 milliGRAM(s) Oral every 6 hours PRN Mild Pain (1 - 3)      CAPILLARY BLOOD GLUCOSE      POCT Blood Glucose.: 77 mg/dL (15 Chinmay 2020 09:08)    I&O's Summary    15 Chinmay 2020 07:01  -  16 Jan 2020 01:06  --------------------------------------------------------  IN: 390 mL / OUT: 880 mL / NET: -490 mL        PHYSICAL EXAM:    GENERAL: NAD  NECK: Supple, No JVD  CHEST/LUNG: Clear to auscultation bilaterally; No wheezing.  HEART: Regular rate and rhythm; No murmurs, rubs, or gallops  ABDOMEN: Soft, Nontender, Nondistended; Bowel sounds present  EXTREMITIES:  2+ Peripheral Pulses, No edema  NEUROLOGY: AAOx 3      LABS:                        10.6   6.44  )-----------( 236      ( 15 Chinmay 2020 13:45 )             33.1     01-15    136  |  99  |  22  ----------------------------<  197<H>  3.4<L>   |  20<L>  |  0.56    Ca    9.0      15 Chinmay 2020 13:45              CAPILLARY BLOOD GLUCOSE      POCT Blood Glucose.: 77 mg/dL (15 Chinmay 2020 09:08)                RADIOLOGY & ADDITIONAL TESTS:    Imaging Personally Reviewed:    Consultant(s) Notes Reviewed:      Care Discussed with Consultants/Other Providers:    Thanks for consult. Will follow.

## 2020-01-15 NOTE — PROGRESS NOTE ADULT - SUBJECTIVE AND OBJECTIVE BOX
Ortho Post-Op    Patient is seen and examined at bedside. Denies CP/SOB/Dizziness. complains of pain.     Vital Signs Last 24 Hrs  T(C): 36.3 (15 Chinmay 2020 08:56), Max: 36.3 (15 Chinmay 2020 08:56)  T(F): 97.4 (15 Chinmay 2020 08:56), Max: 97.4 (15 Chinmay 2020 08:56)  HR: 73 (15 Chinmay 2020 14:45) (70 - 86)  BP: 121/53 (15 Chinmay 2020 14:45) (100/62 - 138/70)  BP(mean): 70 (15 Chinmay 2020 14:45) (68 - 96)  RR: 9 (15 Chinmay 2020 14:45) (9 - 19)  SpO2: 95% (15 Chinmay 2020 14:45) (93% - 100%)    GEN: NAD     LLE: Dressing C/D/I.  HV in place   Motor intact + EHL/FHL/TA/GS. Sensation is grossly intact distal . Extremity warm. Compartments are soft. DP 2+    Labs:                          10.6   6.44  )-----------( 236      ( 15 Chinmay 2020 13:45 )             33.1       01-15    136  |  99  |  22  ----------------------------<  197<H>  3.4<L>   |  20<L>  |  0.56    Ca    9.0      15 Chinmay 2020 13:45        A/P: Patient is a 74y y/o Female s/p left total knee arthroplasty , POD # 0    -monitor HV  -Pain control/analgesia  -Inc spirometry  -Venodynes/foot pumps  -F/U AM Labs  -PT/OT/WBAT  -Antibiotic periop  -Anticoagulation - A325 BID

## 2020-01-16 ENCOUNTER — TRANSCRIPTION ENCOUNTER (OUTPATIENT)
Age: 75
End: 2020-01-16

## 2020-01-16 LAB
ANION GAP SERPL CALC-SCNC: 13 MMO/L — SIGNIFICANT CHANGE UP (ref 7–14)
BUN SERPL-MCNC: 16 MG/DL — SIGNIFICANT CHANGE UP (ref 7–23)
CALCIUM SERPL-MCNC: 9.3 MG/DL — SIGNIFICANT CHANGE UP (ref 8.4–10.5)
CHLORIDE SERPL-SCNC: 103 MMOL/L — SIGNIFICANT CHANGE UP (ref 98–107)
CO2 SERPL-SCNC: 21 MMOL/L — LOW (ref 22–31)
CREAT SERPL-MCNC: 0.5 MG/DL — SIGNIFICANT CHANGE UP (ref 0.5–1.3)
GLUCOSE SERPL-MCNC: 154 MG/DL — HIGH (ref 70–99)
HCT VFR BLD CALC: 32.2 % — LOW (ref 34.5–45)
HGB BLD-MCNC: 10.3 G/DL — LOW (ref 11.5–15.5)
MCHC RBC-ENTMCNC: 29.3 PG — SIGNIFICANT CHANGE UP (ref 27–34)
MCHC RBC-ENTMCNC: 32 % — SIGNIFICANT CHANGE UP (ref 32–36)
MCV RBC AUTO: 91.7 FL — SIGNIFICANT CHANGE UP (ref 80–100)
NRBC # FLD: 0 K/UL — SIGNIFICANT CHANGE UP (ref 0–0)
PLATELET # BLD AUTO: 239 K/UL — SIGNIFICANT CHANGE UP (ref 150–400)
PMV BLD: 9.5 FL — SIGNIFICANT CHANGE UP (ref 7–13)
POTASSIUM SERPL-MCNC: 4.1 MMOL/L — SIGNIFICANT CHANGE UP (ref 3.5–5.3)
POTASSIUM SERPL-SCNC: 4.1 MMOL/L — SIGNIFICANT CHANGE UP (ref 3.5–5.3)
RBC # BLD: 3.51 M/UL — LOW (ref 3.8–5.2)
RBC # FLD: 14.2 % — SIGNIFICANT CHANGE UP (ref 10.3–14.5)
SODIUM SERPL-SCNC: 137 MMOL/L — SIGNIFICANT CHANGE UP (ref 135–145)
WBC # BLD: 10.33 K/UL — SIGNIFICANT CHANGE UP (ref 3.8–10.5)
WBC # FLD AUTO: 10.33 K/UL — SIGNIFICANT CHANGE UP (ref 3.8–10.5)

## 2020-01-16 RX ADMIN — OXYCODONE HYDROCHLORIDE 5 MILLIGRAM(S): 5 TABLET ORAL at 07:30

## 2020-01-16 RX ADMIN — TRAMADOL HYDROCHLORIDE 50 MILLIGRAM(S): 50 TABLET ORAL at 02:57

## 2020-01-16 RX ADMIN — Medication 325 MILLIGRAM(S): at 06:41

## 2020-01-16 RX ADMIN — TRAMADOL HYDROCHLORIDE 50 MILLIGRAM(S): 50 TABLET ORAL at 19:03

## 2020-01-16 RX ADMIN — TRAMADOL HYDROCHLORIDE 50 MILLIGRAM(S): 50 TABLET ORAL at 14:04

## 2020-01-16 RX ADMIN — Medication 975 MILLIGRAM(S): at 22:45

## 2020-01-16 RX ADMIN — Medication 50 MILLIGRAM(S): at 22:45

## 2020-01-16 RX ADMIN — LORATADINE 10 MILLIGRAM(S): 10 TABLET ORAL at 16:29

## 2020-01-16 RX ADMIN — TRAMADOL HYDROCHLORIDE 50 MILLIGRAM(S): 50 TABLET ORAL at 19:31

## 2020-01-16 RX ADMIN — SODIUM CHLORIDE 3 MILLILITER(S): 9 INJECTION INTRAMUSCULAR; INTRAVENOUS; SUBCUTANEOUS at 06:38

## 2020-01-16 RX ADMIN — Medication 102 MILLIGRAM(S): at 06:52

## 2020-01-16 RX ADMIN — OXYCODONE HYDROCHLORIDE 5 MILLIGRAM(S): 5 TABLET ORAL at 06:41

## 2020-01-16 RX ADMIN — Medication 975 MILLIGRAM(S): at 15:14

## 2020-01-16 RX ADMIN — SODIUM CHLORIDE 100 MILLILITER(S): 9 INJECTION, SOLUTION INTRAVENOUS at 22:45

## 2020-01-16 RX ADMIN — Medication 975 MILLIGRAM(S): at 23:44

## 2020-01-16 RX ADMIN — Medication 10 MILLIGRAM(S): at 17:37

## 2020-01-16 RX ADMIN — Medication 975 MILLIGRAM(S): at 06:43

## 2020-01-16 RX ADMIN — OXYCODONE HYDROCHLORIDE 10 MILLIGRAM(S): 5 TABLET ORAL at 22:53

## 2020-01-16 RX ADMIN — Medication 975 MILLIGRAM(S): at 14:14

## 2020-01-16 RX ADMIN — Medication 2 SPRAY(S): at 14:13

## 2020-01-16 RX ADMIN — OXYCODONE HYDROCHLORIDE 10 MILLIGRAM(S): 5 TABLET ORAL at 23:44

## 2020-01-16 RX ADMIN — BUPROPION HYDROCHLORIDE 150 MILLIGRAM(S): 150 TABLET, EXTENDED RELEASE ORAL at 12:32

## 2020-01-16 RX ADMIN — BUDESONIDE AND FORMOTEROL FUMARATE DIHYDRATE 2 PUFF(S): 160; 4.5 AEROSOL RESPIRATORY (INHALATION) at 10:09

## 2020-01-16 RX ADMIN — SODIUM CHLORIDE 3 MILLILITER(S): 9 INJECTION INTRAMUSCULAR; INTRAVENOUS; SUBCUTANEOUS at 22:47

## 2020-01-16 RX ADMIN — Medication 100 MILLIGRAM(S): at 02:50

## 2020-01-16 RX ADMIN — Medication 75 MILLIGRAM(S): at 17:36

## 2020-01-16 RX ADMIN — PANTOPRAZOLE SODIUM 40 MILLIGRAM(S): 20 TABLET, DELAYED RELEASE ORAL at 06:40

## 2020-01-16 RX ADMIN — POLYETHYLENE GLYCOL 3350 17 GRAM(S): 17 POWDER, FOR SOLUTION ORAL at 12:32

## 2020-01-16 RX ADMIN — Medication 75 MILLIGRAM(S): at 06:41

## 2020-01-16 RX ADMIN — Medication 90 MILLIGRAM(S): at 06:40

## 2020-01-16 RX ADMIN — OXYCODONE HYDROCHLORIDE 10 MILLIGRAM(S): 5 TABLET ORAL at 11:36

## 2020-01-16 RX ADMIN — SODIUM CHLORIDE 100 MILLILITER(S): 9 INJECTION, SOLUTION INTRAVENOUS at 17:37

## 2020-01-16 RX ADMIN — OXYCODONE HYDROCHLORIDE 10 MILLIGRAM(S): 5 TABLET ORAL at 10:36

## 2020-01-16 RX ADMIN — TRAMADOL HYDROCHLORIDE 50 MILLIGRAM(S): 50 TABLET ORAL at 12:31

## 2020-01-16 RX ADMIN — SODIUM CHLORIDE 100 MILLILITER(S): 9 INJECTION, SOLUTION INTRAVENOUS at 10:09

## 2020-01-16 RX ADMIN — SODIUM CHLORIDE 3 MILLILITER(S): 9 INJECTION INTRAMUSCULAR; INTRAVENOUS; SUBCUTANEOUS at 14:04

## 2020-01-16 RX ADMIN — Medication 325 MILLIGRAM(S): at 17:37

## 2020-01-16 RX ADMIN — Medication 10 MILLIGRAM(S): at 06:41

## 2020-01-16 NOTE — DISCHARGE NOTE PROVIDER - NSDCCPCAREPLAN_GEN_ALL_CORE_FT
PRINCIPAL DISCHARGE DIAGNOSIS  Diagnosis: Unilateral primary osteoarthritis, left knee  Assessment and Plan of Treatment: 74year old female is admitted for elective Left total knee arthroplasty 1/15/2020 without any intraoperative complications.  The patient is stable and doing well.  As per Dr. Larsen may be discharge to home, as patient is safe and appropriate for home discharge.  Patient is tolerating physical therapy: weight bearing to operative Left leg, gait training, exercises as shown by Dr. Larsen and Physical Therapy.  Keep wound dressing on as is until day of office visit.  Staples/sutures if applicable, to be removed in the office 14 days from date of surgery.  Compressive knee ace dressing to be removed by Home Care Visiting nurse on the next day after discharge from the hospital.  Patient is on Aspirin (MUST TAKE WITH FOOD) for anticoagulation.  Orthopaedic Surgeon Dr. Larsen would like patient to call private MD/Internist for appointment postop to maintain continuity of care.  Follow up with Dr. Larsen's office in 1-2 weeks.   Istop reviewed Reference #: [id number]

## 2020-01-16 NOTE — DISCHARGE NOTE PROVIDER - CARE PROVIDER_API CALL
Pola Larsen)  Orthopaedic Surgery  [address], [address]  [place] [address]  Phone: [phone]  Fax: [phone]  Follow Up Time:

## 2020-01-16 NOTE — DISCHARGE NOTE PROVIDER - NSDCMRMEDTOKEN_GEN_ALL_CORE_FT
acetaminophen 500 mg oral tablet: 1 tab(s) orally every 8 hours, As Needed  albuterol 90 mcg/inh inhalation aerosol: 2 puff(s) inhaled every 6 hours, As Needed  ammonium lactate 12% topical lotion: Apply topically to affected area once a day, As Needed  aspirin 81 mg oral tablet: 1 tab(s) orally once a day  last dose 1/7  atorvastatin 10 mg oral tablet: 1 tab(s) orally once a day (at bedtime)  buPROPion 150 mg/12 hours (SR) oral tablet, extended release: 1 tab(s) orally once a day  calcium-vitamin D 500 mg-200 intl units (5 mcg) oral tablet: 1 tab(s) orally once a day  diclofenac 1% topical gel: Apply topically to affected area 4 x/day  last dose 1/7  diphenhydrAMINE 25 mg oral tablet: 2 tab(s) orally once a day at bedtime  docusate sodium 100 mg oral tablet: 1 tab(s) orally 3 times a day, As Needed  Fish Oil  1000 mg daily: last dose 1/7  Flonase 50 mcg/inh nasal spray: 2 spray(s) nasal once a day  fluticasone 44 mcg/inh inhalation aerosol with adapter: 2  inhaled 2 times a day  Hydrocort 2.5% topical cream: Apply topically to affected area 2 times a day  loratadine 10 mg oral tablet: 1 tab(s) orally once a day  losartan 100 mg oral tablet: 1 tab(s) orally once a day (at bedtime)  meclizine 12.5 mg oral tablet: 3 times a day  meloxicam 7.5 mg oral tablet: 1 tab(s) orally once a day last dose 1/7  mometasone 0.1% topical cream: Apply topically to affected area once a day, As Needed  montelukast 10 mg oral tablet: 1 tab(s) orally once a day (at bedtime)  MVI 1 tab orally daily  last dose 1/7:   NIFEdipine 90 mg oral tablet, extended release: 1 tab(s) orally once a day  pantoprazole 40 mg oral delayed release tablet: 1 tab(s) orally once a day  potassium chloride 10 mEq oral tablet, extended release: 1 tab(s) orally 2 times a week  Premarin 0.625 mg/g vaginal cream with applicator: once a week  Senna 8.6 mg oral tablet: 2 tab(s) orally 2 times a day  solifenacin 10 mg oral tablet: 1 tab(s) orally once a day  Turmeric 2000 mg daily  last dose 1/7:   Vitamin D3 1000 intl units (25 mcg) oral tablet: 1 tab(s) orally once a day  vitamin E alpha 100 intl units oral capsule: 1 cap(s) orally once a day, last dose 1/7/20 acetaminophen 500 mg oral tablet: 2 tab(s) orally every 8 hours for pain MDD:6  albuterol 90 mcg/inh inhalation aerosol: 2 puff(s) inhaled every 6 hours, As Needed  ammonium lactate 12% topical lotion: Apply topically to affected area once a day, As Needed  Aspirin Enteric Coated 325 mg oral delayed release tablet: 1 tab(s) orally 2 times a day for blood clot prevention  atorvastatin 10 mg oral tablet: 1 tab(s) orally once a day (at bedtime)  Bedside Commode: Bedside Commode    Hx Left Total knee arthroplasty  buPROPion 150 mg/12 hours (SR) oral tablet, extended release: 1 tab(s) orally once a day  calcium-vitamin D 500 mg-200 intl units (5 mcg) oral tablet: 1 tab(s) orally once a day  docusate sodium 100 mg oral capsule: 1 cap(s) orally 3 times a day for constipation  fluticasone 44 mcg/inh inhalation aerosol with adapter: 2  inhaled 2 times a day  gabapentin 100 mg oral capsule: 1 cap(s) orally every 8 hours   Hydrocort 2.5% topical cream: Apply topically to affected area 2 times a day  loratadine 10 mg oral tablet: 1 tab(s) orally once a day  losartan 100 mg oral tablet: 1 tab(s) orally once a day (at bedtime)  meclizine 12.5 mg oral tablet: 3 times a day  meloxicam 15 mg oral tablet: 1  orally once a day   mometasone 0.1% topical cream: Apply topically to affected area once a day, As Needed  montelukast 10 mg oral tablet: 1 tab(s) orally once a day (at bedtime)  NIFEdipine 90 mg oral tablet, extended release: 1 tab(s) orally once a day  oxyCODONE 5 mg oral tablet: 1-2 tab(s) orally every 6 hours, As Needed moderate to severe pain MDD:8  pantoprazole 40 mg oral delayed release tablet: 1 tab(s) orally once a day (before a meal) for stomach protectant  potassium chloride 10 mEq oral tablet, extended release: 1 tab(s) orally 2 times a week  Premarin 0.625 mg/g vaginal cream with applicator: once a week  Rolling walker: 1 Rolling walker    Hx Left total knee arthroplasty  senna oral tablet: 2 tab(s) orally once a day   solifenacin 10 mg oral tablet: 1 tab(s) orally once a day  traMADol 50 mg oral tablet: 1 tab(s) orally every 8 hours for baseline pain MDD:3  Vitamin D3 1000 intl units (25 mcg) oral tablet: 1 tab(s) orally once a day  vitamin E alpha 100 intl units oral capsule: 1 cap(s) orally once a day, last dose 1/7/20

## 2020-01-16 NOTE — PROGRESS NOTE ADULT - SUBJECTIVE AND OBJECTIVE BOX
Patient is seen and examined. Denies CP/SOB/DIzziness/N/V/D/HA. Pain is controlled.     Vital Signs Last 24 Hrs  T(C): 36.4 (16 Jan 2020 01:17), Max: 36.4 (16 Jan 2020 01:17)  T(F): 97.6 (16 Jan 2020 01:17), Max: 97.6 (16 Jan 2020 01:17)  HR: 86 (16 Jan 2020 01:17) (69 - 86)  BP: 129/68 (16 Jan 2020 02:53) (100/62 - 138/70)  BP(mean): 84 (15 Chinmay 2020 16:00) (68 - 96)  RR: 18 (16 Jan 2020 01:17) (9 - 20)  SpO2: 99% (16 Jan 2020 01:17) (93% - 100%)    Gen: NAD    LLE: Dressing C/D/I.   Motor intact 5/5 BL LE. Sensation is grossly intact in the BL LE. Compartments are soft, extremities are warm. DP 1+ BL LE.    Labs:                        10.6   6.44  )-----------( 236      ( 15 Chinmay 2020 13:45 )             33.1       01-15    136  |  99  |  22  ----------------------------<  197<H>  3.4<L>   |  20<L>  |  0.56    Ca    9.0      15 Chinmay 2020 13:45        A/P: Patient is a 74y Female s/p L total knee arthroplasty, POD # 1    - Pain control / Analgesia  - Anticoagulation  - PT/OT  - WBAT  - OOB  - Inc Spirometry   -Foot Pumps  - F/U AM Labs  - Notify Ortho with any questions

## 2020-01-16 NOTE — DISCHARGE NOTE PROVIDER - NSDCACTIVITY_GEN_ALL_CORE
Walking - Indoors allowed/No heavy lifting/straining/Do not drive or operate machinery/Walking - Outdoors allowed/Showering allowed/Do not make important decisions/Stairs allowed

## 2020-01-16 NOTE — DISCHARGE NOTE PROVIDER - HOSPITAL COURSE
74year old female is admitted for elective Left total knee arthroplasty 1/15/2020 without any intraoperative complications.  The patient is stable and doing well.  As per Dr. Larsen may be discharge to home, as patient is safe and appropriate for home discharge.  Patient is tolerating physical therapy: weight bearing to operative Left leg, gait training, exercises as shown by Dr. Larsen and Physical Therapy.  Keep wound dressing on as is until day of office visit.  Staples/sutures if applicable, to be removed in the office 14 days from date of surgery.  Compressive knee ace dressing to be removed by Home Care Visiting nurse on the next day after discharge from the hospital.  Patient is on Aspirin (MUST TAKE WITH FOOD) for anticoagulation.  Orthopaedic Surgeon Dr. Larsen would like patient to call private MD/Internist for appointment postop to maintain continuity of care.  Follow up with Dr. Larsen's office in 1-2 weeks.         Istop reviewed Reference #: [id number]

## 2020-01-16 NOTE — PROGRESS NOTE ADULT - SUBJECTIVE AND OBJECTIVE BOX
ANESTHESIA POSTOP CHECK    74y Female POSTOP DAY 1 S/P left tkr    Vital Signs Last 24 Hrs  T(C): 36.6 (16 Jan 2020 10:07), Max: 36.8 (16 Jan 2020 06:36)  T(F): 97.8 (16 Jan 2020 10:07), Max: 98.2 (16 Jan 2020 06:36)  HR: 87 (16 Jan 2020 10:07) (69 - 87)  BP: 137/77 (16 Jan 2020 10:07) (106/58 - 139/74)  BP(mean): 84 (15 Chinmay 2020 16:00) (70 - 91)  RR: 18 (16 Jan 2020 10:07) (9 - 20)  SpO2: 99% (16 Jan 2020 10:07) (93% - 100%)  I&O's Summary    15 Chinmay 2020 07:01  -  16 Jan 2020 07:00  --------------------------------------------------------  IN: 390 mL / OUT: 880 mL / NET: -490 mL    16 Jan 2020 07:01  -  16 Jan 2020 13:41  --------------------------------------------------------  IN: 0 mL / OUT: 200 mL / NET: -200 mL        [x ] NO APPARENT ANESTHESIA COMPLICATIONS      Comments:

## 2020-01-16 NOTE — DISCHARGE NOTE PROVIDER - CARE PROVIDERS DIRECT ADDRESSES
[email or web address]

## 2020-01-16 NOTE — DISCHARGE NOTE PROVIDER - NSDCFUSCHEDAPPT_GEN_ALL_CORE_FT
DAPHNE JORGENSEN ; 01/29/2020 ; [facility] [address] [place] [address] DAPHNE JORGENSEN ; 01/29/2020 ; [facility] [address] [place] [address]

## 2020-01-16 NOTE — PROGRESS NOTE ADULT - SUBJECTIVE AND OBJECTIVE BOX
Patient is a 74y old  Female who presents with a chief complaint of elective Left total knee arthroplasty 1/15/2020 (16 Jan 2020 11:44)      SUBJECTIVE / OVERNIGHT EVENTS:    Events noted.  CONSTITUTIONAL: No fever,  or fatigue  RESPIRATORY: No cough, wheezing,  No shortness of breath  CARDIOVASCULAR: No chest pain, palpitations, dizziness, or leg swelling  GASTROINTESTINAL: No abdominal or epigastric pain. No nausea, vomiting.  NEUROLOGICAL: No headaches,     MEDICATIONS  (STANDING):  acetaminophen   Tablet .. 975 milliGRAM(s) Oral every 8 hours  aspirin enteric coated 325 milliGRAM(s) Oral two times a day  atorvastatin 10 milliGRAM(s) Oral at bedtime  budesonide  80 MICROgram(s)/formoterol 4.5 MICROgram(s) Inhaler 2 Puff(s) Inhalation two times a day  buPROPion XL . 150 milliGRAM(s) Oral daily  diphenhydrAMINE 50 milliGRAM(s) Oral at bedtime  fluticasone propionate 50 MICROgram(s)/spray Nasal Spray 2 Spray(s) Both Nostrils daily  lactated ringers. 1000 milliLiter(s) (100 mL/Hr) IV Continuous <Continuous>  loratadine 10 milliGRAM(s) Oral daily  losartan 100 milliGRAM(s) Oral daily  meclizine 12.5 milliGRAM(s) Oral three times a day  montelukast 10 milliGRAM(s) Oral at bedtime  NIFEdipine XL 90 milliGRAM(s) Oral daily  oxybutynin 10 milliGRAM(s) Oral two times a day  pantoprazole    Tablet 40 milliGRAM(s) Oral before breakfast  polyethylene glycol 3350 17 Gram(s) Oral daily  pregabalin 75 milliGRAM(s) Oral two times a day  senna 2 Tablet(s) Oral at bedtime  sodium chloride 0.9% lock flush 3 milliLiter(s) IV Push every 8 hours  traMADol 50 milliGRAM(s) Oral every 8 hours    MEDICATIONS  (PRN):  ALBUTerol    90 MICROgram(s) HFA Inhaler 2 Puff(s) Inhalation every 6 hours PRN Shortness of Breath  aluminum hydroxide/magnesium hydroxide/simethicone Suspension 30 milliLiter(s) Oral four times a day PRN Indigestion  bisacodyl Suppository 10 milliGRAM(s) Rectal daily PRN If no bowel movement by postoperative day #2  HYDROmorphone  Injectable 0.5 milliGRAM(s) IV Push every 4 hours PRN breakthrough pain  ondansetron Injectable 4 milliGRAM(s) IV Push every 6 hours PRN Nausea and/or Vomiting  oxyCODONE    IR 5 milliGRAM(s) Oral every 4 hours PRN Moderate Pain (4 - 6)  oxyCODONE    IR 10 milliGRAM(s) Oral every 4 hours PRN Severe Pain (7 - 10)  oxyCODONE    IR 2.5 milliGRAM(s) Oral every 6 hours PRN Mild Pain (1 - 3)        CAPILLARY BLOOD GLUCOSE        I&O's Summary    15 Chinmay 2020 07:01  -  16 Jan 2020 07:00  --------------------------------------------------------  IN: 390 mL / OUT: 880 mL / NET: -490 mL    16 Jan 2020 07:01  -  17 Jan 2020 00:14  --------------------------------------------------------  IN: 0 mL / OUT: 1000 mL / NET: -1000 mL        PHYSICAL EXAM:  GENERAL: NAD  NECK: Supple, No JVD  CHEST/LUNG: Clear to auscultation bilaterally; No wheezing.  HEART: Regular rate and rhythm; No murmurs, rubs, or gallops  ABDOMEN: Soft, Nontender, Nondistended; Bowel sounds present  EXTREMITIES:   No edema  NEUROLOGY: AAO X 3      LABS:                        10.3   10.33 )-----------( 239      ( 16 Jan 2020 05:42 )             32.2     01-16    137  |  103  |  16  ----------------------------<  154<H>  4.1   |  21<L>  |  0.50    Ca    9.3      16 Jan 2020 05:42              CAPILLARY BLOOD GLUCOSE                    RADIOLOGY & ADDITIONAL TESTS:    Imaging Personally Reviewed:    Consultant(s) Notes Reviewed:      Care Discussed with Consultants/Other Providers:

## 2020-01-17 ENCOUNTER — TRANSCRIPTION ENCOUNTER (OUTPATIENT)
Age: 75
End: 2020-01-17

## 2020-01-17 VITALS
SYSTOLIC BLOOD PRESSURE: 117 MMHG | OXYGEN SATURATION: 95 % | HEART RATE: 77 BPM | DIASTOLIC BLOOD PRESSURE: 59 MMHG | RESPIRATION RATE: 16 BRPM | TEMPERATURE: 99 F

## 2020-01-17 PROCEDURE — 99238 HOSP IP/OBS DSCHRG MGMT 30/<: CPT

## 2020-01-17 RX ORDER — OXYCODONE HYDROCHLORIDE 5 MG/1
2 TABLET ORAL
Qty: 56 | Refills: 0
Start: 2020-01-17 | End: 2020-01-23

## 2020-01-17 RX ORDER — PANTOPRAZOLE SODIUM 20 MG/1
1 TABLET, DELAYED RELEASE ORAL
Qty: 0 | Refills: 0 | DISCHARGE

## 2020-01-17 RX ORDER — PANTOPRAZOLE SODIUM 20 MG/1
1 TABLET, DELAYED RELEASE ORAL
Qty: 30 | Refills: 0
Start: 2020-01-17 | End: 2020-02-15

## 2020-01-17 RX ORDER — MELOXICAM 15 MG/1
1 TABLET ORAL
Qty: 30 | Refills: 0
Start: 2020-01-17 | End: 2020-02-15

## 2020-01-17 RX ORDER — DICLOFENAC SODIUM 30 MG/G
0 GEL TOPICAL
Qty: 0 | Refills: 0 | DISCHARGE

## 2020-01-17 RX ORDER — FLUTICASONE PROPIONATE 50 MCG
2 SPRAY, SUSPENSION NASAL
Qty: 0 | Refills: 0 | DISCHARGE

## 2020-01-17 RX ORDER — ASPIRIN/CALCIUM CARB/MAGNESIUM 324 MG
1 TABLET ORAL
Qty: 0 | Refills: 0 | DISCHARGE

## 2020-01-17 RX ORDER — ASPIRIN/CALCIUM CARB/MAGNESIUM 324 MG
1 TABLET ORAL
Qty: 60 | Refills: 0
Start: 2020-01-17 | End: 2020-02-15

## 2020-01-17 RX ORDER — TRAMADOL HYDROCHLORIDE 50 MG/1
1 TABLET ORAL
Qty: 21 | Refills: 0
Start: 2020-01-17 | End: 2020-01-23

## 2020-01-17 RX ORDER — DOCUSATE SODIUM 100 MG
1 CAPSULE ORAL
Qty: 0 | Refills: 0 | DISCHARGE

## 2020-01-17 RX ORDER — DIPHENHYDRAMINE HCL 50 MG
2 CAPSULE ORAL
Qty: 0 | Refills: 0 | DISCHARGE

## 2020-01-17 RX ORDER — SENNA PLUS 8.6 MG/1
2 TABLET ORAL
Qty: 0 | Refills: 0 | DISCHARGE

## 2020-01-17 RX ORDER — MELOXICAM 15 MG/1
1 TABLET ORAL
Qty: 0 | Refills: 0 | DISCHARGE

## 2020-01-17 RX ORDER — ACETAMINOPHEN 500 MG
2 TABLET ORAL
Qty: 180 | Refills: 0
Start: 2020-01-17 | End: 2020-02-15

## 2020-01-17 RX ORDER — ACETAMINOPHEN 500 MG
1 TABLET ORAL
Qty: 0 | Refills: 0 | DISCHARGE

## 2020-01-17 RX ORDER — SENNA PLUS 8.6 MG/1
2 TABLET ORAL
Qty: 60 | Refills: 0
Start: 2020-01-17 | End: 2020-02-15

## 2020-01-17 RX ORDER — DOCUSATE SODIUM 100 MG
1 CAPSULE ORAL
Qty: 90 | Refills: 0
Start: 2020-01-17 | End: 2020-02-15

## 2020-01-17 RX ORDER — GABAPENTIN 400 MG/1
1 CAPSULE ORAL
Qty: 90 | Refills: 0
Start: 2020-01-17 | End: 2020-02-15

## 2020-01-17 RX ADMIN — TRAMADOL HYDROCHLORIDE 50 MILLIGRAM(S): 50 TABLET ORAL at 12:33

## 2020-01-17 RX ADMIN — OXYCODONE HYDROCHLORIDE 10 MILLIGRAM(S): 5 TABLET ORAL at 09:12

## 2020-01-17 RX ADMIN — Medication 975 MILLIGRAM(S): at 06:35

## 2020-01-17 RX ADMIN — BUPROPION HYDROCHLORIDE 150 MILLIGRAM(S): 150 TABLET, EXTENDED RELEASE ORAL at 12:33

## 2020-01-17 RX ADMIN — OXYCODONE HYDROCHLORIDE 10 MILLIGRAM(S): 5 TABLET ORAL at 10:04

## 2020-01-17 RX ADMIN — Medication 325 MILLIGRAM(S): at 17:25

## 2020-01-17 RX ADMIN — SODIUM CHLORIDE 3 MILLILITER(S): 9 INJECTION INTRAMUSCULAR; INTRAVENOUS; SUBCUTANEOUS at 13:22

## 2020-01-17 RX ADMIN — POLYETHYLENE GLYCOL 3350 17 GRAM(S): 17 POWDER, FOR SOLUTION ORAL at 12:33

## 2020-01-17 RX ADMIN — Medication 75 MILLIGRAM(S): at 06:35

## 2020-01-17 RX ADMIN — Medication 975 MILLIGRAM(S): at 07:22

## 2020-01-17 RX ADMIN — Medication 10 MILLIGRAM(S): at 17:25

## 2020-01-17 RX ADMIN — PANTOPRAZOLE SODIUM 40 MILLIGRAM(S): 20 TABLET, DELAYED RELEASE ORAL at 06:35

## 2020-01-17 RX ADMIN — Medication 325 MILLIGRAM(S): at 06:36

## 2020-01-17 RX ADMIN — OXYCODONE HYDROCHLORIDE 10 MILLIGRAM(S): 5 TABLET ORAL at 16:43

## 2020-01-17 RX ADMIN — TRAMADOL HYDROCHLORIDE 50 MILLIGRAM(S): 50 TABLET ORAL at 03:21

## 2020-01-17 RX ADMIN — Medication 90 MILLIGRAM(S): at 06:35

## 2020-01-17 RX ADMIN — Medication 975 MILLIGRAM(S): at 13:22

## 2020-01-17 RX ADMIN — SODIUM CHLORIDE 3 MILLILITER(S): 9 INJECTION INTRAMUSCULAR; INTRAVENOUS; SUBCUTANEOUS at 06:27

## 2020-01-17 RX ADMIN — Medication 10 MILLIGRAM(S): at 06:35

## 2020-01-17 RX ADMIN — Medication 75 MILLIGRAM(S): at 17:25

## 2020-01-17 RX ADMIN — TRAMADOL HYDROCHLORIDE 50 MILLIGRAM(S): 50 TABLET ORAL at 04:20

## 2020-01-17 NOTE — DISCHARGE NOTE NURSING/CASE MANAGEMENT/SOCIAL WORK - PATIENT PORTAL LINK FT
You can access the FollowMyHealth Patient Portal offered by [facility] by registering at the following website: [email or web address]. By joining [employer]’s FollowMyHealth portal, you will also be able to view your health information using other applications (apps) compatible with our system.

## 2020-01-17 NOTE — DISCHARGE NOTE NURSING/CASE MANAGEMENT/SOCIAL WORK - NSDCPNINST_GEN_ALL_CORE
You have a post op appointment with Dr. Larsen on January 29,2020 @ 8:45am in the [address] [place] [address] office. If you are unable to keep this appointment, please call the office to reschedule. Call MD if you develop a fever, or if there is redness, swelling, drainage or pain not relieved by pain medication. No heavy lifting, bending, or straining to move your bowels. Take over the counter stool softeners as needed to prevent constipation which may be caused by pain medication.

## 2020-01-17 NOTE — PROGRESS NOTE ADULT - SUBJECTIVE AND OBJECTIVE BOX
Patient is a 74y old  Female who presents with a chief complaint of elective Left total knee arthroplasty 1/15/2020 (16 Jan 2020 11:44)      SUBJECTIVE / OVERNIGHT EVENTS:    Events noted.  CONSTITUTIONAL: No fever,  or fatigue  RESPIRATORY: No cough, wheezing,  No shortness of breath  CARDIOVASCULAR: No chest pain, palpitations, dizziness, or leg swelling  GASTROINTESTINAL: No abdominal or epigastric pain. No nausea, vomiting.  NEUROLOGICAL: No headaches,     MEDICATIONS  (STANDING):  acetaminophen   Tablet .. 975 milliGRAM(s) Oral every 8 hours  aspirin enteric coated 325 milliGRAM(s) Oral two times a day  atorvastatin 10 milliGRAM(s) Oral at bedtime  budesonide  80 MICROgram(s)/formoterol 4.5 MICROgram(s) Inhaler 2 Puff(s) Inhalation two times a day  buPROPion XL . 150 milliGRAM(s) Oral daily  diphenhydrAMINE 50 milliGRAM(s) Oral at bedtime  fluticasone propionate 50 MICROgram(s)/spray Nasal Spray 2 Spray(s) Both Nostrils daily  lactated ringers. 1000 milliLiter(s) (100 mL/Hr) IV Continuous <Continuous>  loratadine 10 milliGRAM(s) Oral daily  losartan 100 milliGRAM(s) Oral daily  meclizine 12.5 milliGRAM(s) Oral three times a day  montelukast 10 milliGRAM(s) Oral at bedtime  NIFEdipine XL 90 milliGRAM(s) Oral daily  oxybutynin 10 milliGRAM(s) Oral two times a day  pantoprazole    Tablet 40 milliGRAM(s) Oral before breakfast  polyethylene glycol 3350 17 Gram(s) Oral daily  pregabalin 75 milliGRAM(s) Oral two times a day  senna 2 Tablet(s) Oral at bedtime  sodium chloride 0.9% lock flush 3 milliLiter(s) IV Push every 8 hours  traMADol 50 milliGRAM(s) Oral every 8 hours    MEDICATIONS  (PRN):  ALBUTerol    90 MICROgram(s) HFA Inhaler 2 Puff(s) Inhalation every 6 hours PRN Shortness of Breath  aluminum hydroxide/magnesium hydroxide/simethicone Suspension 30 milliLiter(s) Oral four times a day PRN Indigestion  bisacodyl Suppository 10 milliGRAM(s) Rectal daily PRN If no bowel movement by postoperative day #2  HYDROmorphone  Injectable 0.5 milliGRAM(s) IV Push every 4 hours PRN breakthrough pain  ondansetron Injectable 4 milliGRAM(s) IV Push every 6 hours PRN Nausea and/or Vomiting  oxyCODONE    IR 5 milliGRAM(s) Oral every 4 hours PRN Moderate Pain (4 - 6)  oxyCODONE    IR 10 milliGRAM(s) Oral every 4 hours PRN Severe Pain (7 - 10)  oxyCODONE    IR 2.5 milliGRAM(s) Oral every 6 hours PRN Mild Pain (1 - 3)        CAPILLARY BLOOD GLUCOSE        I&O's Summary    15 Chinmay 2020 07:01  -  16 Jan 2020 07:00  --------------------------------------------------------  IN: 390 mL / OUT: 880 mL / NET: -490 mL    16 Jan 2020 07:01  -  17 Jan 2020 00:14  --------------------------------------------------------  IN: 0 mL / OUT: 1000 mL / NET: -1000 mL        PHYSICAL EXAM:  GENERAL: NAD  NECK: Supple, No JVD  CHEST/LUNG: Clear to auscultation bilaterally; No wheezing.  HEART: Regular rate and rhythm; No murmurs, rubs, or gallops  ABDOMEN: Soft, Nontender, Nondistended; Bowel sounds present  EXTREMITIES:   No edema  NEUROLOGY: AAO X 3      LABS:                        10.3   10.33 )-----------( 239      ( 16 Jan 2020 05:42 )             32.2     01-16    137  |  103  |  16  ----------------------------<  154<H>  4.1   |  21<L>  |  0.50    Ca    9.3      16 Jan 2020 05:42              CAPILLARY BLOOD GLUCOSE                    RADIOLOGY & ADDITIONAL TESTS:    Imaging Personally Reviewed:    Consultant(s) Notes Reviewed:      Care Discussed with Consultants/Other Providers: no change in ability to perform activities

## 2020-01-17 NOTE — DISCHARGE NOTE NURSING/CASE MANAGEMENT/SOCIAL WORK - NSDCFUADDAPPT_GEN_ALL_CORE_FT
You have a post op appointment with Dr. Larsen on January 29,2020 @ 8:45am in the [address] [place] [address].

## 2020-01-17 NOTE — PROGRESS NOTE ADULT - SUBJECTIVE AND OBJECTIVE BOX
Ortho Progress Note  DAPHNE JORGENSEN   MRN-[id number]    74yFemale is s/p elective Left total knee arthroplasty POD#2 w/out any c/o.  Resting comfortably, NAD, ANO x 3    Vital Signs Last 24 Hrs  T(C): 36.4 (17 Jan 2020 01:17), Max: 36.8 (16 Jan 2020 06:36)  T(F): 97.5 (17 Jan 2020 01:17), Max: 98.2 (16 Jan 2020 06:36)  HR: 77 (17 Jan 2020 01:17) (77 - 95)  BP: 119/62 (17 Jan 2020 01:17) (119/62 - 145/63)  BP(mean): --  RR: 98 (17 Jan 2020 01:17) (17 - 98)  SpO2: 97% (17 Jan 2020 01:17) (97% - 99%)  No Known Allergies      S/P L TKA   L knee wound compressive dressing is C/D/I  currently elevated on pillow properly  motor LLE EHL, TA, GS intact  sensation LLE intact to light touch  foot pumps on                           10.3   10.33 )-----------( 239      ( 16 Jan 2020 05:42 )             32.2     01-16    137  |  103  |  16  ----------------------------<  154<H>  4.1   |  21<L>  |  0.50    Ca    9.3      16 Jan 2020 05:42          A/P Ortho Stable s/p elective Left total knee arthroplasty POD#2  continue Aspirin for anticoagulation   continue Physical Therapy BID: WBAT, OOB for gait training  discharge planning today when pt is cleared by physical therapy for safe home discharge

## 2020-01-23 LAB — SURGICAL PATHOLOGY STUDY: SIGNIFICANT CHANGE UP

## 2020-01-29 ENCOUNTER — APPOINTMENT (OUTPATIENT)
Dept: ORTHOPEDIC SURGERY | Facility: CLINIC | Age: 75
End: 2020-01-29
Payer: MEDICARE

## 2020-01-29 VITALS — HEART RATE: 75 BPM | HEIGHT: 66 IN | SYSTOLIC BLOOD PRESSURE: 133 MMHG | DIASTOLIC BLOOD PRESSURE: 83 MMHG

## 2020-01-29 PROCEDURE — 73562 X-RAY EXAM OF KNEE 3: CPT | Mod: LT

## 2020-01-29 PROCEDURE — 99024 POSTOP FOLLOW-UP VISIT: CPT

## 2020-01-29 RX ORDER — OXYCODONE 5 MG/1
5 TABLET ORAL
Qty: 56 | Refills: 0 | Status: ACTIVE | COMMUNITY
Start: 2020-01-29

## 2020-01-29 RX ORDER — TRAMADOL HYDROCHLORIDE 50 MG/1
50 TABLET, COATED ORAL 3 TIMES DAILY
Qty: 90 | Refills: 0 | Status: ACTIVE | COMMUNITY
Start: 2020-01-29

## 2020-01-29 RX ORDER — ASPIRIN/ACETAMINOPHEN/CAFFEINE 500-325-65
325 POWDER IN PACKET (EA) ORAL
Qty: 28 | Refills: 0 | Status: ACTIVE | COMMUNITY
Start: 2020-01-29 | End: 1900-01-01

## 2020-01-29 RX ORDER — PANTOPRAZOLE 40 MG/1
40 TABLET, DELAYED RELEASE ORAL DAILY
Qty: 14 | Refills: 1 | Status: ACTIVE | COMMUNITY
Start: 2020-01-29 | End: 1900-01-01

## 2020-01-29 RX ORDER — MELOXICAM 15 MG/1
15 TABLET ORAL
Qty: 30 | Refills: 0 | Status: ACTIVE | COMMUNITY
Start: 2020-01-29

## 2020-01-29 NOTE — CONSULT LETTER
[Dear  ___] : Dear  [unfilled], [I had the pleasure of evaluating your patient, [unfilled].] : I had the pleasure of evaluating your patient, [unfilled]. [Please see my note below.] : Please see my note below. [Sincerely,] : Sincerely, [FreeTextEntry2] : KONRAD ROBLES\par  [FreeTextEntry3] : Pola Larsen MD\par \par ______________________________________________\par [facility]: Hip/Knee Arthroplasty\par [address], [address], [place] [address]\par (t) [phone]\par (f) [phone]\par

## 2020-01-29 NOTE — HISTORY OF PRESENT ILLNESS
[Xray (Date:___)] : [unfilled] Xray -  [Slow Progress] : is progressing slowly [Adequate Pain Control] : has adequate pain control [No Sign of Infection] : is showing no signs of infection [de-identified] : status post left total knee replacement 1/15/2020 [de-identified] : DAPHNE JORGENSEN is doing well s/p left total knee replacement.  she  is participating in home physical therapy, as well as a home exercise program daily. she  is taking oxycodone, tramadol tylenol, gabapentin, meloxicam for pain and pain level is not well controlled. she  is taking ecotrin 325 BID for DVT ppx. She is ambulatory with a walker. She arrives with her son for evaluation\par  [de-identified] : LEFT Knee: Walks with a left stiff knee gait. The Mepilex was removed today, staples removed and steri strips have been applied to the incision. There is a well-healed scar surgery with no significant swelling, redness or tenderness. There is a valgus alignment of 5°, no effusion, active straight leg raise of 60° and knee range of motion of -5-75° with good stability alignment and quadriceps grade 4 power. There is LE edema. Negative homans\par  [de-identified] : The patient was informed of the findings and recommended conservative management in the form of a home exercise program, activity modifications, stationary bicycling, and ambulation with a walker.  A prescription for a course of physical therapy was provided for her to begin outpatient therapy when discharged from home treatment.   Prescriptions for oxycodone, tramadol, tylenol, gabapentin, meloxicam have been provided for pain control.  A long discussion was had with the patient advising them to wean from use of the narcotic medication as tolerated.  she will take aspirin 325 BID for DVT prophylaxis for another four weeks.  Concerning the post operative swelling, she has been advised it is more severe in her case due to her history of varicose veins and swelling prior to surgery. She has been recommended for continued elevation regularly, as well as use of compression stockings to reduce the swelling. The risks, benefits, and side effects were discussed.  Follow up appointment was recommended in 2 weeks for reevaluation.  [de-identified] : AP, lateral, and skyline views of the left knee taken today reveal a well fixed and aligned left total knee replacement with no evidence of mechanical failure or periprosthetic fracture.\par  [de-identified] : s/p left total knee replacement.

## 2020-02-27 ENCOUNTER — APPOINTMENT (OUTPATIENT)
Dept: ORTHOPEDIC SURGERY | Facility: CLINIC | Age: 75
End: 2020-02-27
Payer: MEDICARE

## 2020-02-27 VITALS
WEIGHT: 185 LBS | BODY MASS INDEX: 29.73 KG/M2 | HEART RATE: 81 BPM | SYSTOLIC BLOOD PRESSURE: 122 MMHG | HEIGHT: 66 IN | DIASTOLIC BLOOD PRESSURE: 76 MMHG

## 2020-02-27 PROCEDURE — 99024 POSTOP FOLLOW-UP VISIT: CPT

## 2020-02-27 RX ORDER — GABAPENTIN 100 MG/1
100 CAPSULE ORAL 3 TIMES DAILY
Qty: 90 | Refills: 0 | Status: ACTIVE | COMMUNITY
Start: 2020-02-27 | End: 1900-01-01

## 2020-02-27 RX ORDER — OXYCODONE 5 MG/1
5 TABLET ORAL
Qty: 42 | Refills: 0 | Status: ACTIVE | COMMUNITY
Start: 2020-02-27 | End: 1900-01-01

## 2020-02-27 RX ORDER — MELOXICAM 15 MG/1
15 TABLET ORAL
Qty: 30 | Refills: 1 | Status: ACTIVE | COMMUNITY
Start: 2020-02-27 | End: 1900-01-01

## 2020-02-27 RX ORDER — ACETAMINOPHEN 325 MG/1
325 TABLET ORAL EVERY 6 HOURS
Qty: 240 | Refills: 0 | Status: ACTIVE | COMMUNITY
Start: 2020-02-27 | End: 1900-01-01

## 2020-02-27 NOTE — CONSULT LETTER
[Dear  ___] : Dear  [unfilled], [Please see my note below.] : Please see my note below. [I had the pleasure of evaluating your patient, [unfilled].] : I had the pleasure of evaluating your patient, [unfilled]. [Sincerely,] : Sincerely, [FreeTextEntry2] : KONRAD ROBLES\par  [FreeTextEntry3] : Pola Larsen MD\par \par ______________________________________________\par [facility]: Hip/Knee Arthroplasty\par [address], [address], [place] [address]\par (t) [phone]\par (f) [phone]\par

## 2020-02-27 NOTE — HISTORY OF PRESENT ILLNESS
[Xray (Date:___)] : [unfilled] Xray -  [Adequate Pain Control] : has adequate pain control [No Sign of Infection] : is showing no signs of infection [Clean/Dry/Intact] : clean, dry and intact [Healed] : healed [Neuro Intact] : an unremarkable neurological exam [Negative Elias's] : maneuvers demonstrated a negative Elias's sign [Vascular Intact] : ~T peripheral vascular exam normal [Doing Well] : is doing well [Chills] : no chills [Fever] : no fever [Erythema] : not erythematous [Swelling] : not swollen [Discharge] : absent of discharge [Dehiscence] : not dehisced [de-identified] : status post left total knee replacement 1/15/2020 [de-identified] : DAPHNE JORGENSEN is doing well s/p left total knee replacement.  She is participating in outpatient physical therapy, as well as a home exercise program daily. she is taking oxycodone, tramadol tylenol, gabapentin, meloxicam for pain and pain level is not well controlled. she is still taking ecotrin 325 BID for DVT ppx. She is ambulatory with a walker. She arrives with her son for evaluation Patient states she needs a refill of pain medications. Swelling has improved since last visit. \par  [de-identified] : LEFT Knee: Walks with a left stiff knee gait. There is a well-healed scar surgery with no significant swelling, redness or tenderness. There is a valgus alignment of 5°, no effusion, active straight leg raise of 60° and knee range of motion of 0-90° with good stability alignment and quadriceps grade 4 power. [de-identified] : s/p left total knee replacement.  [de-identified] : AP, lateral, and skyline views of the left knee taken last visit reveal a well fixed and aligned left total knee replacement with no evidence of mechanical failure or periprosthetic fracture.\par  [de-identified] : The patient was informed of the findings and recommended conservative management in the form of a home exercise program, activity modifications, stationary bicycling, and ambulation with a walker.  A prescription for a course of physical therapy was provided. Prescriptions for oxycodone, tylenol, gabapentin, meloxicam have been provided for pain control.  Patient had a recent refill of tramadol according to [facility]. A long discussion was had with the patient advising them to wean from use of the narcotic medication as tolerated.  She no longer requires aspirin 325 BID for DVT prophylaxis, She will take to her PCP about angela back on ASA 81 Daily. The risks, benefits, and side effects were discussed.  Follow up appointment was recommended annually.

## 2020-03-24 RX ORDER — TRAMADOL HYDROCHLORIDE 50 MG/1
50 TABLET, COATED ORAL
Qty: 90 | Refills: 0 | Status: ACTIVE | COMMUNITY
Start: 2020-03-24 | End: 1900-01-01

## 2020-03-24 RX ORDER — MELOXICAM 15 MG/1
15 TABLET ORAL DAILY
Qty: 30 | Refills: 1 | Status: ACTIVE | COMMUNITY
Start: 2020-03-24 | End: 1900-01-01

## 2020-03-24 RX ORDER — ACETAMINOPHEN 325 MG/1
325 TABLET, FILM COATED ORAL EVERY 8 HOURS
Qty: 180 | Refills: 0 | Status: ACTIVE | COMMUNITY
Start: 2020-01-29 | End: 1900-01-01

## 2020-03-26 RX ORDER — ACETAMINOPHEN 325 MG/1
325 TABLET ORAL EVERY 6 HOURS
Qty: 120 | Refills: 0 | Status: ACTIVE | COMMUNITY
Start: 2020-03-26 | End: 1900-01-01

## 2020-03-31 RX ORDER — ACETAMINOPHEN 500 MG/1
500 TABLET ORAL EVERY 6 HOURS
Qty: 56 | Refills: 1 | Status: ACTIVE | COMMUNITY
Start: 2020-03-31 | End: 1900-01-01

## 2020-04-10 ENCOUNTER — RX RENEWAL (OUTPATIENT)
Age: 75
End: 2020-04-10

## 2020-05-01 RX ORDER — SENNA 8.6 MG/1
8.6 TABLET, FILM COATED ORAL
Qty: 30 | Refills: 0 | Status: ACTIVE | COMMUNITY
Start: 2020-01-29 | End: 1900-01-01

## 2020-05-01 RX ORDER — DOCUSATE SODIUM 100 MG/1
100 CAPSULE ORAL 3 TIMES DAILY
Qty: 90 | Refills: 0 | Status: ACTIVE | COMMUNITY
Start: 2020-01-29 | End: 1900-01-01

## 2020-05-01 RX ORDER — GABAPENTIN 100 MG/1
100 CAPSULE ORAL 3 TIMES DAILY
Qty: 90 | Refills: 0 | Status: ACTIVE | COMMUNITY
Start: 2020-01-29 | End: 1900-01-01

## 2020-05-01 RX ORDER — DICLOFENAC SODIUM 75 MG/1
75 TABLET, DELAYED RELEASE ORAL
Qty: 180 | Refills: 0 | Status: ACTIVE | COMMUNITY
Start: 2020-04-10 | End: 1900-01-01

## 2020-05-01 RX ORDER — OXYCODONE 5 MG/1
5 TABLET ORAL
Qty: 80 | Refills: 0 | Status: ACTIVE | COMMUNITY
Start: 2020-03-26 | End: 1900-01-01

## 2020-05-07 ENCOUNTER — APPOINTMENT (OUTPATIENT)
Dept: ORTHOPEDIC SURGERY | Facility: CLINIC | Age: 75
End: 2020-05-07

## 2020-06-16 ENCOUNTER — RX RENEWAL (OUTPATIENT)
Age: 75
End: 2020-06-16

## 2020-06-16 RX ORDER — TRAMADOL HYDROCHLORIDE 50 MG/1
50 TABLET, COATED ORAL 3 TIMES DAILY
Qty: 90 | Refills: 0 | Status: ACTIVE | COMMUNITY
Start: 2020-06-16 | End: 1900-01-01

## 2020-06-16 RX ORDER — DOCUSATE SODIUM 100 MG/1
100 CAPSULE ORAL 3 TIMES DAILY
Qty: 90 | Refills: 0 | Status: ACTIVE | COMMUNITY
Start: 2020-06-16 | End: 1900-01-01

## 2020-06-16 RX ORDER — MELOXICAM 15 MG/1
15 TABLET ORAL
Qty: 90 | Refills: 0 | Status: ACTIVE | COMMUNITY
Start: 2020-06-16 | End: 1900-01-01

## 2020-06-16 RX ORDER — ACETAMINOPHEN 500 MG/1
500 TABLET, COATED ORAL 3 TIMES DAILY
Qty: 180 | Refills: 0 | Status: ACTIVE | COMMUNITY
Start: 2020-06-16 | End: 1900-01-01

## 2020-06-16 RX ORDER — GABAPENTIN 100 MG/1
100 CAPSULE ORAL 3 TIMES DAILY
Qty: 90 | Refills: 0 | Status: ACTIVE | COMMUNITY
Start: 2020-06-16 | End: 1900-01-01

## 2020-06-16 RX ORDER — SENNOSIDES 8.6 MG TABLETS 8.6 MG/1
8.6 TABLET ORAL
Qty: 60 | Refills: 0 | Status: ACTIVE | COMMUNITY
Start: 2020-06-16 | End: 1900-01-01

## 2020-12-08 ENCOUNTER — APPOINTMENT (OUTPATIENT)
Dept: ORTHOPEDIC SURGERY | Facility: CLINIC | Age: 75
End: 2020-12-08
Payer: MEDICARE

## 2020-12-08 VITALS — TEMPERATURE: 97.8 F

## 2020-12-08 PROCEDURE — 99214 OFFICE O/P EST MOD 30 MIN: CPT | Mod: 25

## 2020-12-08 PROCEDURE — 73564 X-RAY EXAM KNEE 4 OR MORE: CPT | Mod: 50

## 2020-12-08 PROCEDURE — 20610 DRAIN/INJ JOINT/BURSA W/O US: CPT | Mod: RT

## 2020-12-08 RX ORDER — BENZONATATE 100 MG/1
100 CAPSULE ORAL
Qty: 21 | Refills: 0 | Status: ACTIVE | COMMUNITY
Start: 2020-11-05

## 2020-12-08 RX ORDER — ACYCLOVIR 200 MG/5ML
200 SUSPENSION ORAL
Qty: 250 | Refills: 0 | Status: ACTIVE | COMMUNITY
Start: 2020-11-23

## 2020-12-08 RX ORDER — LIDOCAINE HYDROCHLORIDE 20 MG/ML
2 SOLUTION ORAL; TOPICAL
Qty: 100 | Refills: 0 | Status: ACTIVE | COMMUNITY
Start: 2020-11-10

## 2020-12-08 RX ORDER — OXYBUTYNIN CHLORIDE 15 MG/1
15 TABLET, EXTENDED RELEASE ORAL
Qty: 90 | Refills: 0 | Status: ACTIVE | COMMUNITY
Start: 2020-03-11

## 2020-12-08 RX ORDER — AZITHROMYCIN 500 MG/1
500 TABLET, FILM COATED ORAL
Qty: 3 | Refills: 0 | Status: ACTIVE | COMMUNITY
Start: 2020-11-05

## 2020-12-08 RX ORDER — FAMOTIDINE 40 MG/1
40 TABLET, FILM COATED ORAL
Qty: 30 | Refills: 0 | Status: ACTIVE | COMMUNITY
Start: 2020-11-10

## 2020-12-08 RX ORDER — B-COMPLEX WITH VITAMIN C
500-200 TABLET ORAL
Qty: 90 | Refills: 0 | Status: ACTIVE | COMMUNITY
Start: 2020-07-15

## 2020-12-08 RX ORDER — LOSARTAN POTASSIUM 100 MG/1
100 TABLET, FILM COATED ORAL
Qty: 90 | Refills: 0 | Status: ACTIVE | COMMUNITY
Start: 2020-02-19

## 2020-12-08 RX ORDER — ASPIRIN 81 MG/1
81 TABLET, COATED ORAL
Qty: 90 | Refills: 0 | Status: ACTIVE | COMMUNITY
Start: 2020-07-15

## 2020-12-08 NOTE — PHYSICAL EXAM
[de-identified] : Patient is well nourished, well-developed, in no acute distress, with appropriate mood and affect. The patient is oriented to time, place, and person. Respirations are even and unlabored. Gait evaluation does reveal a limp. There is no inguinal adenopathy. Bilateral limbs are well-perfused, without skin lesions, shows a grossly normal motor and sensory examination. The right knee motion is significantly reduced and does cause significant pain. The right knee moves from 0-115 degrees. The knee is stable within that range-of-motion to AP and ML stress. The alignment of the knee is 5 degrees of valgus. Muscle strength is normal. Pedal pulses are palpable. Hip examination was negative. The left knee motion is painless and the left knee moves from -5-120 degrees. The knee is stable within that range-of-motion to AP and ML stress. The alignment of the knee is neutral.  Well-healed midline surgical scar.  Muscle strength is normal. Pedal pulses are palpable. Hip examination was negative. [de-identified] : Long standing knee, AP knee, lateral knee, and patellar views of the right knee were ordered and taken in the office and demonstrate degenerative joint disease of the knee with joint space narrowing, osteophyte formation, and subchondral sclerosis.\par \par AP, lateral, sunrise knee x-rays of the left knee were ordered and obtained in the office and demonstrate satisfactory position and alignment of the components are present. No signs of loosening are seen.

## 2020-12-08 NOTE — HISTORY OF PRESENT ILLNESS
[de-identified] : This is a 75-year-old female experiencing right knee pain, which is severe in intensity.  She has known right knee osteoarthritis.  She is also possibly 1 year status post left total knee arthroplasty by [facility].the pain substantially limits activities of daily living. Walking tolerance is reduced.  She does not take any NSAIDs for this.  She does not do physical therapy.  She does use a cane.  She is not satisfied with her outcome for left total knee arthroplasty elbow on further questioning she tells me she is actually much better than before surgery.  She still has some nuisance symptoms.  The patient denies any radiation of the pain to the feet and it is not associated with numbness, tingling, or weakness.

## 2020-12-08 NOTE — DISCUSSION/SUMMARY
[de-identified] : This patient has right knee osteoarthritis.  This patient has a well-functioning left total knee arthroplasty although it hyperextends somewhat but this is how it was left by the previous surgeon.  The patient is not an appropriate candidate for surgical intervention at this time. An extensive discussion was conducted on the natural history of the disease and the variety of surgical and non-surgical options available to the patient including, but not limited to non-steroidal anti-inflammatory medications, steroid injections, physical therapy, maintenance of ideal body weight, and reduction of activity.  Today we performed a right knee intra-articular cortisone injection.  She can take Tylenol for pain.  The patient will schedule an appointment as needed.\par \par Informed consent for the right knee injection was obtained. All questions were answered. A time out was performed. The right knee was prepped and draped in sterile fashion. Using sterile technique, the right knee was injected with 2cc of Kenalog, 4cc of 1% lidocaine, 4cc of 0.25% marcaine using a 21-gauge needle. A sterile dressing was applied. Post injection instructions were reviewed. The patient tolerated the procedure well.\par \par

## 2021-01-12 ENCOUNTER — RX RENEWAL (OUTPATIENT)
Age: 76
End: 2021-01-12

## 2021-02-11 ENCOUNTER — EMERGENCY (EMERGENCY)
Facility: HOSPITAL | Age: 76
LOS: 1 days | Discharge: HOME | End: 2021-02-11
Attending: EMERGENCY MEDICINE | Admitting: EMERGENCY MEDICINE
Payer: MEDICARE

## 2021-02-11 VITALS
OXYGEN SATURATION: 99 % | TEMPERATURE: 98 F | HEART RATE: 90 BPM | DIASTOLIC BLOOD PRESSURE: 69 MMHG | RESPIRATION RATE: 18 BRPM | HEIGHT: 66 IN | SYSTOLIC BLOOD PRESSURE: 156 MMHG

## 2021-02-11 DIAGNOSIS — Z98.51 TUBAL LIGATION STATUS: Chronic | ICD-10-CM

## 2021-02-11 LAB
ALBUMIN SERPL ELPH-MCNC: 5 G/DL — SIGNIFICANT CHANGE UP (ref 3.3–5)
ALP SERPL-CCNC: 53 U/L — SIGNIFICANT CHANGE UP (ref 40–120)
ALT FLD-CCNC: 31 U/L — SIGNIFICANT CHANGE UP (ref 4–33)
ANION GAP SERPL CALC-SCNC: 13 MMOL/L — SIGNIFICANT CHANGE UP (ref 7–14)
APPEARANCE UR: CLEAR — SIGNIFICANT CHANGE UP
AST SERPL-CCNC: 41 U/L — HIGH (ref 4–32)
BACTERIA # UR AUTO: NEGATIVE — SIGNIFICANT CHANGE UP
BASOPHILS # BLD AUTO: 0.03 K/UL — SIGNIFICANT CHANGE UP (ref 0–0.2)
BASOPHILS NFR BLD AUTO: 0.5 % — SIGNIFICANT CHANGE UP (ref 0–2)
BILIRUB SERPL-MCNC: 0.4 MG/DL — SIGNIFICANT CHANGE UP (ref 0.2–1.2)
BILIRUB UR-MCNC: NEGATIVE — SIGNIFICANT CHANGE UP
BUN SERPL-MCNC: 20 MG/DL — SIGNIFICANT CHANGE UP (ref 7–23)
CALCIUM SERPL-MCNC: 9.7 MG/DL — SIGNIFICANT CHANGE UP (ref 8.4–10.5)
CHLORIDE SERPL-SCNC: 99 MMOL/L — SIGNIFICANT CHANGE UP (ref 98–107)
CO2 SERPL-SCNC: 27 MMOL/L — SIGNIFICANT CHANGE UP (ref 22–31)
COLOR SPEC: YELLOW — SIGNIFICANT CHANGE UP
CREAT SERPL-MCNC: 0.73 MG/DL — SIGNIFICANT CHANGE UP (ref 0.5–1.3)
DIFF PNL FLD: NEGATIVE — SIGNIFICANT CHANGE UP
EOSINOPHIL # BLD AUTO: 0.12 K/UL — SIGNIFICANT CHANGE UP (ref 0–0.5)
EOSINOPHIL NFR BLD AUTO: 2.1 % — SIGNIFICANT CHANGE UP (ref 0–6)
EPI CELLS # UR: 2 /HPF — SIGNIFICANT CHANGE UP (ref 0–5)
GLUCOSE SERPL-MCNC: 102 MG/DL — HIGH (ref 70–99)
GLUCOSE UR QL: NEGATIVE — SIGNIFICANT CHANGE UP
HCT VFR BLD CALC: 39.1 % — SIGNIFICANT CHANGE UP (ref 34.5–45)
HGB BLD-MCNC: 12.3 G/DL — SIGNIFICANT CHANGE UP (ref 11.5–15.5)
HYALINE CASTS # UR AUTO: 0 /LPF — SIGNIFICANT CHANGE UP (ref 0–7)
IANC: 2.52 K/UL — SIGNIFICANT CHANGE UP (ref 1.5–8.5)
IMM GRANULOCYTES NFR BLD AUTO: 0.3 % — SIGNIFICANT CHANGE UP (ref 0–1.5)
KETONES UR-MCNC: NEGATIVE — SIGNIFICANT CHANGE UP
LEUKOCYTE ESTERASE UR-ACNC: ABNORMAL
LYMPHOCYTES # BLD AUTO: 2.69 K/UL — SIGNIFICANT CHANGE UP (ref 1–3.3)
LYMPHOCYTES # BLD AUTO: 46.4 % — HIGH (ref 13–44)
MCHC RBC-ENTMCNC: 27.4 PG — SIGNIFICANT CHANGE UP (ref 27–34)
MCHC RBC-ENTMCNC: 31.5 GM/DL — LOW (ref 32–36)
MCV RBC AUTO: 87.1 FL — SIGNIFICANT CHANGE UP (ref 80–100)
MONOCYTES # BLD AUTO: 0.42 K/UL — SIGNIFICANT CHANGE UP (ref 0–0.9)
MONOCYTES NFR BLD AUTO: 7.2 % — SIGNIFICANT CHANGE UP (ref 2–14)
NEUTROPHILS # BLD AUTO: 2.52 K/UL — SIGNIFICANT CHANGE UP (ref 1.8–7.4)
NEUTROPHILS NFR BLD AUTO: 43.5 % — SIGNIFICANT CHANGE UP (ref 43–77)
NITRITE UR-MCNC: NEGATIVE — SIGNIFICANT CHANGE UP
NRBC # BLD: 0 /100 WBCS — SIGNIFICANT CHANGE UP
NRBC # FLD: 0 K/UL — SIGNIFICANT CHANGE UP
PH UR: 7.5 — SIGNIFICANT CHANGE UP (ref 5–8)
PLATELET # BLD AUTO: 259 K/UL — SIGNIFICANT CHANGE UP (ref 150–400)
POTASSIUM SERPL-MCNC: 4.4 MMOL/L — SIGNIFICANT CHANGE UP (ref 3.5–5.3)
POTASSIUM SERPL-SCNC: 4.4 MMOL/L — SIGNIFICANT CHANGE UP (ref 3.5–5.3)
PROT SERPL-MCNC: 8.2 G/DL — SIGNIFICANT CHANGE UP (ref 6–8.3)
PROT UR-MCNC: NEGATIVE — SIGNIFICANT CHANGE UP
RBC # BLD: 4.49 M/UL — SIGNIFICANT CHANGE UP (ref 3.8–5.2)
RBC # FLD: 14.6 % — HIGH (ref 10.3–14.5)
RBC CASTS # UR COMP ASSIST: 2 /HPF — SIGNIFICANT CHANGE UP (ref 0–4)
SODIUM SERPL-SCNC: 139 MMOL/L — SIGNIFICANT CHANGE UP (ref 135–145)
SP GR SPEC: 1.02 — SIGNIFICANT CHANGE UP (ref 1.01–1.02)
UROBILINOGEN FLD QL: SIGNIFICANT CHANGE UP
WBC # BLD: 5.8 K/UL — SIGNIFICANT CHANGE UP (ref 3.8–10.5)
WBC # FLD AUTO: 5.8 K/UL — SIGNIFICANT CHANGE UP (ref 3.8–10.5)
WBC UR QL: 2 /HPF — SIGNIFICANT CHANGE UP (ref 0–5)

## 2021-02-11 PROCEDURE — 71045 X-RAY EXAM CHEST 1 VIEW: CPT | Mod: 26

## 2021-02-11 PROCEDURE — 93010 ELECTROCARDIOGRAM REPORT: CPT

## 2021-02-11 PROCEDURE — 99284 EMERGENCY DEPT VISIT MOD MDM: CPT

## 2021-02-11 RX ORDER — SODIUM CHLORIDE 9 MG/ML
1000 INJECTION INTRAMUSCULAR; INTRAVENOUS; SUBCUTANEOUS ONCE
Refills: 0 | Status: COMPLETED | OUTPATIENT
Start: 2021-02-11 | End: 2021-02-11

## 2021-02-11 RX ORDER — METOCLOPRAMIDE HCL 10 MG
10 TABLET ORAL ONCE
Refills: 0 | Status: COMPLETED | OUTPATIENT
Start: 2021-02-11 | End: 2021-02-11

## 2021-02-11 RX ORDER — LIDOCAINE 4 G/100G
10 CREAM TOPICAL ONCE
Refills: 0 | Status: COMPLETED | OUTPATIENT
Start: 2021-02-11 | End: 2021-02-11

## 2021-02-11 RX ADMIN — SODIUM CHLORIDE 1000 MILLILITER(S): 9 INJECTION INTRAMUSCULAR; INTRAVENOUS; SUBCUTANEOUS at 20:48

## 2021-02-11 RX ADMIN — Medication 10 MILLIGRAM(S): at 20:48

## 2021-02-11 RX ADMIN — LIDOCAINE 10 MILLILITER(S): 4 CREAM TOPICAL at 20:47

## 2021-02-11 NOTE — ED ADULT TRIAGE NOTE - CHIEF COMPLAINT QUOTE
PT C/O headache, body aches, chills, CP, sore throat, loss of taste x 1 month. Denies ABD pain, N/V/D. PHX: HTN, HLD, GERD asthma.

## 2021-02-11 NOTE — ED ADULT NURSE REASSESSMENT NOTE - NS ED NURSE REASSESS COMMENT FT1
Float RN: 20G IV placed to R AC, labs drawn and sent, covid swabbed, medicated as per orders, pt in NAD at this time, respirations even/unlabored, primary RN made aware.

## 2021-02-11 NOTE — ED PROVIDER NOTE - NS ED ROS FT
CONSTITUTIONAL: No fevers, no dizziness  EYES: no visual changes, no eye pain  EARS: no ear drainage, no change in hearing  NOSE: no nasal congestion  MOUTH/THROAT: see hpi  CV: no palpitations  RESP: see hpi  GI: No n/v/d, no abd pain  : no dysuria, no hematuria, no flank pain  MSK: no back pain, no extremity pain  SKIN: no rashes  NEURO: no focal weakness, no decreased sensation/paresthesias

## 2021-02-11 NOTE — ED PROVIDER NOTE - CLINICAL SUMMARY MEDICAL DECISION MAKING FREE TEXT BOX
76yo F w/ pmx as noted w/ wide constitution of sx likely viral in nature. CP component is low risk/atypical of ACS. Will check labs, r/o infx w/ cxr/ua and covid swab and trop/ekg. Likely can dc home w/ fu with pmd if castillo unremarkable. Yolande att: 74yo F w/ pmx as noted w/ wide constitution of sx likely viral in nature. CP component is low risk/atypical of ACS. Will check labs, r/o infx w/ cxr/ua and covid swab and trop/ekg. Likely can dc home w/ fu with pmd if castillo unremarkable.

## 2021-02-11 NOTE — ED PROVIDER NOTE - OBJECTIVE STATEMENT
74yo F hx htn hld gerd asthma depression co 1-2 months of headache, body aches, chills, lightheadedness L sided constant cp (no exacerbating/relieving factors), sob relieved with albuterol, cough, sore throat and loss of taste. Notes that for the last few weeks she feels like "food is getting stuck" but is able to tolerate solids and liquids and no n/v. Her constitution of sx have been getting worse for the last week. Called pcp's office and told by nurse on phone to come to ED. Did not get tested for covid.

## 2021-02-11 NOTE — ED PROVIDER NOTE - NSFOLLOWUPINSTRUCTIONS_ED_ALL_ED_FT
No signs of emergency medical condition on today's workup.  Presumptive diagnosis made, but further evaluation may be required by your primary care doctor or specialist for a definitive diagnosis.  Therefore, follow up as directed and if symptoms change/worsen or any emergency conditions, please return to the ER.     Take tylenol/motrin as needed as directed for any aches/pains/fever    Keep yourself hydrated    Follow up with your primary doctor in 24-48 hours

## 2021-02-11 NOTE — ED PROVIDER NOTE - PHYSICAL EXAMINATION
Gen: Well appearing, NAD  Head: NCAT  HEENT: PERRL, MMM, normal conjunctiva, anicteric, neck supple, oropharynx clear  Lung: CTAB, no adventitious sounds  CV: RRR, no murmurs  Abd: soft, NTND, no rebound or guarding, no CVAT  MSK: No edema, no visible deformities  Neuro: CN II-XII grossly intact. 5/5 strength and normal sensation in all extremities. Ambulatory with stable gait.   Skin: Warm and dry, no evidence of rash    Normal ambulatory sat

## 2021-02-11 NOTE — ED PROVIDER NOTE - PATIENT PORTAL LINK FT
You can access the FollowMyHealth Patient Portal offered by [facility] by registering at the following website: [email or web address]. By joining [employer]’s FollowMyHealth portal, you will also be able to view your health information using other applications (apps) compatible with our system.

## 2021-02-11 NOTE — ED ADULT NURSE REASSESSMENT NOTE - NS ED NURSE REASSESS COMMENT FT1
Received report from BARRY Richards. Vital signs as noted. Float BARRY Briggs at bedside for IV placement/medication administration. Patient in no acute distress, respirations even and unlabored. Will continue to monitor.

## 2021-02-11 NOTE — ED ADULT NURSE NOTE - OBJECTIVE STATEMENT
76 y/o Female with PHX: HTN, HLD, GERD asthma, depression.  Pt c/o headache, body aches, chills, CP radiating to L axila, sore throat, loss of taste x 1-2 month.  States came to ED today for difficulty swallowing.  States generalized weakness and feeling "burning" to the top of her head.  also c/o sore throat and B/L ear pain x 3/4 months,  saw ENT without resolve.  Denies fever, chills, abdominal pain, nausea, vomiting, chest pain, shortness of breath.  Appears very well Mobile Critical Care RN:. 74 y/o Female with PHX: HTN, HLD, GERD asthma, depression.  Pt c/o headache, body aches, chills, CP radiating to L axila, sore throat, loss of taste x 1-2 month.  States came to ED today for difficulty swallowing.  States generalized weakness and feeling "burning" to the top of her head.  also c/o sore throat and B/L ear pain x 3/4 months,  saw ENT without resolve.  Denies fever, chills, abdominal pain, nausea, vomiting, chest pain, shortness of breath.  Appears very well

## 2021-02-12 VITALS
HEART RATE: 79 BPM | TEMPERATURE: 97 F | SYSTOLIC BLOOD PRESSURE: 128 MMHG | RESPIRATION RATE: 17 BRPM | DIASTOLIC BLOOD PRESSURE: 66 MMHG | OXYGEN SATURATION: 100 %

## 2021-02-12 LAB
CULTURE RESULTS: SIGNIFICANT CHANGE UP
SARS-COV-2 RNA SPEC QL NAA+PROBE: SIGNIFICANT CHANGE UP
SPECIMEN SOURCE: SIGNIFICANT CHANGE UP
TROPONIN T, HIGH SENSITIVITY RESULT: 13 NG/L — SIGNIFICANT CHANGE UP

## 2021-03-02 ENCOUNTER — APPOINTMENT (OUTPATIENT)
Dept: ORTHOPEDIC SURGERY | Facility: CLINIC | Age: 76
End: 2021-03-02
Payer: MEDICARE

## 2021-03-02 VITALS — BODY MASS INDEX: 32.33 KG/M2 | WEIGHT: 206 LBS | HEIGHT: 67 IN

## 2021-03-02 DIAGNOSIS — E66.9 OBESITY, UNSPECIFIED: ICD-10-CM

## 2021-03-02 DIAGNOSIS — M17.11 UNILATERAL PRIMARY OSTEOARTHRITIS, RIGHT KNEE: ICD-10-CM

## 2021-03-02 PROCEDURE — 99215 OFFICE O/P EST HI 40 MIN: CPT

## 2021-03-02 NOTE — PHYSICAL EXAM
[de-identified] : \par physical examination of the left knee discloses well-healed midline incision.  No acute effusions.  Range of motion from 0 to 105 degrees flexion.  No acute instability.\par Examination of the right knee discloses mild effusion with anteromedial tenderness and varus deformity.  Range of motion is painful between 10 to 90 degrees [de-identified] : X-rays knee reviewed December 2020 disclose well-positioned total knee implants.  No signs of acute loosening or wear.\par X-rays of the right knee at that time disclose bone-on-bone varus deformity end-stage arthritis

## 2021-03-02 NOTE — HISTORY OF PRESENT ILLNESS
[Worsening] : worsening [___ yrs] : [unfilled] year(s) ago [6] : a minimum pain level of 6/10 [Constant] : ~He/She~ states the symptoms seem to be constant [Walking] : worsened by walking [Knee Flexion] : worsened with knee flexion [de-identified] : Pt presents for initial evaluation with pain in her bilateral knees, pt has had a TKR left  1/2020 by Dr Larsen, Pt has had a cortisone injection to the right knee 12/2020 by Dr Menard, pt has hx of gel injections  approx 2 years ago. Pt has hx boarder line diabetes according to her son. Pt has taken Tylenol for pain with relief. Pt had multiple bruises to the lower right extremity pt states she had  apparent Laser vein treatment. hx asthma, pt is using a cane to ambulate.height 5-3"  206 lbs [de-identified] : certain movements [de-identified] : tylenol

## 2021-03-02 NOTE — DISCUSSION/SUMMARY
[de-identified] : The patient and her accompanying son were apprised of the situation.  They were advised that her left knee appears to be intact and stable at this time no further specific intervention is recommended.  However the right knee is at a point where she is clearly a candidate for total knee arthroplasty.  Given her current BMI and general medical condition she is not appear to be an optimal condition to tolerate further knee surgery.  She will continue her weight loss program and was at highest on weight reduction strategies accordingly.  Patient is taking NSAIDs and had a prior cortisone injection to the right knee.  Follow-up this spring or summer to reevaluate her condition for possible further orthopedic intervention at that time.

## 2021-04-07 ENCOUNTER — APPOINTMENT (OUTPATIENT)
Dept: ORTHOPEDIC SURGERY | Facility: CLINIC | Age: 76
End: 2021-04-07

## 2021-04-13 ENCOUNTER — APPOINTMENT (OUTPATIENT)
Dept: ORTHOPEDIC SURGERY | Facility: CLINIC | Age: 76
End: 2021-04-13
Payer: MEDICARE

## 2021-04-13 VITALS — BODY MASS INDEX: 32.78 KG/M2 | WEIGHT: 204 LBS | HEIGHT: 66 IN

## 2021-04-13 DIAGNOSIS — Z96.652 PRESENCE OF LEFT ARTIFICIAL KNEE JOINT: ICD-10-CM

## 2021-04-13 PROCEDURE — 99214 OFFICE O/P EST MOD 30 MIN: CPT

## 2021-04-13 NOTE — PHYSICAL EXAM
[de-identified] : Right Lower Extremity\par o Knee :\par ¦ Inspection/Palpation : medial joint line tenderness to palpation, no swelling, no deformity\par ¦ Range of Motion : 5 - 95 degrees, no crepitus\par ¦ Stability : no valgus or varus instability present on provocative testing, Lachman’s Test (-)\par ¦ Strength : hip flexion 4/5, \par o Muscle Bulk : normal muscle bulk present\par o Skin : no erythema, marked lower leg diffuse ecchymosis from vascular treatment. \par o Sensation : sensation to pin intact\par o Vascular Exam : no edema, no cyanosis, dorsalis pedis artery pulse 2+, posterior tibial artery pulse 2+\par \par Left Lower Extremity\par o Knee :\par ¦ Inspection/Palpation : no tenderness to palpation, no swelling, no deformity, midline surgical incision well healed. \par ¦ Range of Motion : 0 -110 degrees, no crepitus\par ¦ Stability : no valgus or varus instability present on provocative testing, Lachman’s Test (-)\par ¦ Strength : hip flexion 5/5\par o Muscle Bulk : normal muscle bulk present\par o Skin : no erythema, marked lower leg diffuse ecchymosis from vascular laser treatment. \par o Sensation : sensation to pin intact\par o Vascular Exam : no edema, no cyanosis, dorsalis pedis artery pulse 2+, posterior tibial artery pulse 2+  [de-identified] : X-rays knee reviewed December 2020 disclose well-positioned total knee implants. No signs of acute loosening or wear.\par X-rays of the right knee at that time disclose bone-on-bone varus deformity end-stage arthritis. \par

## 2021-04-13 NOTE — DISCUSSION/SUMMARY
[de-identified] : The underlying pathophysiology was reviewed in great detail with the patient as well as the various treatment options, including ice, analgesics, NSAIDs, Physical therapy, steroid injections, hyaluronic gel injections, TKR\par \par  The risks and benefits of a RIGHT TOTAL KNEE ARTHROPLASTY were discussed in great detail today, including but not limited to bleeding, infection, nerve injury, DVT, allergy to the anesthetic or to the implants, persistent pain, stiffness, scarring, swelling or deformity. Discussed patients lower extremities must heal prior to proceeding with surgical intervention. Advised patient to continued follow up with vascular clearance. Also discussed patient may need vascular clearance for the procedure. \par \par Activity modifications and restrictions were discussed.\par \par I discussed the importance of weight loss to alleviate her knee pain\par \par I have recommended utilizing a knee sleeve to provide added support and stability. \par \par FU 4-6 weeks. \par \par All questions were answered, all alternatives discussed and the patient is in complete agreement with that plan. Follow-up appointment as instructed. Any issues and the patient will call or come in sooner.

## 2021-04-13 NOTE — HISTORY OF PRESENT ILLNESS
[de-identified] : DAPHNE JORGENSEN is a 75 year female presenting to the office complaining of right knee pain.  She presents to the office ambulating with a cane. Patient reports pain intermittently for years with worsening pain over the past few months.  Denies injury or trauma to the area.  The patient describes the pain as a dull aching, and occasionally sharp pain localized to the medial aspect of her right knee that is intermittent in nature. Her symptoms are exacerbated with walking, and standing from a seated position. Pain is alleviated with rest.  Patient denies instability, catching or locking of the knee.  Patient has had multiple corticosteroid injections of the knee. The last of which was on 12/8/2020 by Dr. Menard. Patient notes mild relief with the injection. She has also received multiple hyaluronic injections noting minimal relief after the last injection, which was approximately two years ago. \par Patient is taking Tylenol for pain relief with mild relief in symptoms. Of note patient is s/p left TKR in 1/2020 by Dr Larsen. Patient is a boreder line diabetic as stated by her son. She presents today with multiple bruises to her lower right extremity after a laser stripping procedure. She notes the bandages are being removed tomorrow at her follow up appointment.  Patient denies any other complaints at this time.

## 2021-05-14 ENCOUNTER — APPOINTMENT (OUTPATIENT)
Dept: ORTHOPEDIC SURGERY | Facility: CLINIC | Age: 76
End: 2021-05-14

## 2021-05-18 ENCOUNTER — APPOINTMENT (OUTPATIENT)
Dept: ORTHOPEDIC SURGERY | Facility: CLINIC | Age: 76
End: 2021-05-18
Payer: MEDICARE

## 2021-05-18 PROCEDURE — 99214 OFFICE O/P EST MOD 30 MIN: CPT

## 2021-05-18 NOTE — PHYSICAL EXAM
[de-identified] : Right Lower Extremity\par o Knee :\par ¦ Inspection/Palpation : medial joint line tenderness to palpation, no swelling, no deformity\par ¦ Range of Motion : 5 - 95 degrees, no crepitus\par ¦ Stability : no valgus or varus instability present on provocative testing, Lachman’s Test (-)\par ¦ Strength : hip flexion 4/5, \par o Muscle Bulk : normal muscle bulk present\par o Skin : no erythema, no ecchymosis, wounds from vascular treatment well healed\par o Sensation : sensation to pin intact\par o Vascular Exam : no edema, no cyanosis, dorsalis pedis artery pulse 2+, posterior tibial artery pulse 2+\par \par Left Lower Extremity\par o Knee :\par ¦ Inspection/Palpation : no tenderness to palpation, no swelling, no deformity, midline surgical incision well healed. \par ¦ Range of Motion : 0 -110 degrees, no crepitus\par ¦ Stability : no valgus or varus instability present on provocative testing, Lachman’s Test (-)\par ¦ Strength : hip flexion 5/5\par o Muscle Bulk : normal muscle bulk present\par o Skin : no erythema, marked lower leg diffuse ecchymosis from vascular laser treatment. \par o Sensation : sensation to pin intact\par o Vascular Exam : no edema, no cyanosis, dorsalis pedis artery pulse 2+, posterior tibial artery pulse 2+  [de-identified] : X-rays knee reviewed December 2020 disclose well-positioned total knee implants. No signs of acute loosening or wear.\par X-rays of the right knee at that time disclose bone-on-bone varus deformity end-stage arthritis. \par

## 2021-05-18 NOTE — DISCUSSION/SUMMARY
[de-identified] : The underlying pathophysiology was reviewed in great detail with the patient as well as the various treatment options, including ice, analgesics, NSAIDs, Physical therapy, steroid injections, hyaluronic gel injections, TKR\par \par Patient understands she needs to consider replacement given she is losing motion and conservative measures are not providing enough relief. I informed the patient that surgery will be required to provide them long term relief from their symptoms. The proper pre and post operative procedures and expectations were discussed in extensive detail with the patient. We had a lengthy discussion about the patient's issues, and talked about the benefits and risks of the procedure. She should obtain medical clearance from all pertinent medical doctors. The patient is a candidate for surgery based on imaging and quality of life. Given compromise of quality of life, end stage osteoarthritis on X-ray, the patient is a candidate for total knee replacement.  The risks and benefits of a RIGHT TOTAL KNEE ARTHROPLASTY were discussed in great detail today, including but not limited to bleeding, infection, nerve injury, DVT, allergy to the anesthetic or to the implants, persistent pain, stiffness, scarring, swelling or deformity.The patient wishes to proceed with SURGICAL INTERVENTION at this time.\par \par DAPHNE JORGENSEN was seen face to face and needs a commode for beside use as the patient has no ability to access the bathroom in there home. The patient also requires a rolling walker as this is needed for activities of daily living within their home secondary to the diagnosis of osteoarthritis.\par \par Pre-surgical testing (PST) laboratory tests were ordered today.\par \par Activity modifications and restrictions were discussed.\par \par I discussed the importance of weight loss to alleviate her knee pain\par \par I have recommended utilizing a knee sleeve to provide added support and stability. \par \par All questions were answered, all alternatives discussed and the patient is in complete agreement with that plan. Follow-up appointment as instructed. Any issues and the patient will call or come in sooner.

## 2021-05-18 NOTE — HISTORY OF PRESENT ILLNESS
[de-identified] : DAPHNE JORGENSEN is a 75 year female presenting to the office complaining of right knee pain.  She presents to the office ambulating with a cane. Patient reports pain intermittently for years with worsening pain over the past few months.  Denies injury or trauma to the area.  The patient describes the pain as a dull aching, and occasionally sharp pain localized to the medial aspect of her right knee that is intermittent in nature. Her symptoms are exacerbated with walking, and standing from a seated position. Pain is alleviated with rest.  Patient denies instability, catching or locking of the knee.  Patient has had multiple corticosteroid injections of the knee. The last of which was on 12/8/2020 by Dr. Menard. Patient notes mild relief with the injection. She has also received multiple hyaluronic injections noting minimal relief after the last injection, which was approximately two years ago. Patient returns today to further discuss total knee arthroplasty. She notes she has healed from her laser/venous treatment on lower extremity. \par Patient is taking Tylenol for pain relief with mild relief in symptoms. Of note patient is s/p left TKR in 1/2020 by Dr Larsen. Patient is a borderline diabetic as stated by her son. Patient denies any other complaints at this time.

## 2021-05-24 ENCOUNTER — APPOINTMENT (OUTPATIENT)
Dept: DISASTER EMERGENCY | Facility: CLINIC | Age: 76
End: 2021-05-24

## 2021-05-24 DIAGNOSIS — Z01.818 ENCOUNTER FOR OTHER PREPROCEDURAL EXAMINATION: ICD-10-CM

## 2021-05-27 ENCOUNTER — APPOINTMENT (OUTPATIENT)
Dept: ORTHOPEDIC SURGERY | Facility: HOSPITAL | Age: 76
End: 2021-05-27

## 2021-06-09 ENCOUNTER — OUTPATIENT (OUTPATIENT)
Dept: OUTPATIENT SERVICES | Facility: HOSPITAL | Age: 76
LOS: 1 days | End: 2021-06-09
Payer: MEDICARE

## 2021-06-09 VITALS
DIASTOLIC BLOOD PRESSURE: 84 MMHG | SYSTOLIC BLOOD PRESSURE: 153 MMHG | HEART RATE: 86 BPM | TEMPERATURE: 99 F | OXYGEN SATURATION: 98 % | RESPIRATION RATE: 16 BRPM | HEIGHT: 66 IN | WEIGHT: 199.96 LBS

## 2021-06-09 DIAGNOSIS — Z96.652 PRESENCE OF LEFT ARTIFICIAL KNEE JOINT: Chronic | ICD-10-CM

## 2021-06-09 DIAGNOSIS — Z90.710 ACQUIRED ABSENCE OF BOTH CERVIX AND UTERUS: Chronic | ICD-10-CM

## 2021-06-09 DIAGNOSIS — M17.11 UNILATERAL PRIMARY OSTEOARTHRITIS, RIGHT KNEE: ICD-10-CM

## 2021-06-09 DIAGNOSIS — Z98.51 TUBAL LIGATION STATUS: Chronic | ICD-10-CM

## 2021-06-09 DIAGNOSIS — I83.91 ASYMPTOMATIC VARICOSE VEINS OF RIGHT LOWER EXTREMITY: Chronic | ICD-10-CM

## 2021-06-09 DIAGNOSIS — Z01.818 ENCOUNTER FOR OTHER PREPROCEDURAL EXAMINATION: ICD-10-CM

## 2021-06-09 LAB — BLD GP AB SCN SERPL QL: SIGNIFICANT CHANGE UP

## 2021-06-09 PROCEDURE — G0463: CPT

## 2021-06-09 RX ORDER — IBUPROFEN 200 MG
0 TABLET ORAL
Qty: 0 | Refills: 0 | DISCHARGE

## 2021-06-09 RX ORDER — ESTRADIOL 4 UG/1
0 INSERT VAGINAL
Qty: 0 | Refills: 10 | DISCHARGE

## 2021-06-09 RX ORDER — POTASSIUM CHLORIDE 20 MEQ
0 PACKET (EA) ORAL
Qty: 0 | Refills: 0 | DISCHARGE

## 2021-06-09 RX ORDER — NIFEDIPINE 30 MG
0 TABLET, EXTENDED RELEASE 24 HR ORAL
Qty: 0 | Refills: 5 | DISCHARGE

## 2021-06-09 RX ORDER — CHOLECALCIFEROL (VITAMIN D3) 125 MCG
1 CAPSULE ORAL
Qty: 0 | Refills: 0 | DISCHARGE

## 2021-06-09 RX ORDER — LOSARTAN POTASSIUM 100 MG/1
1 TABLET, FILM COATED ORAL
Qty: 0 | Refills: 0 | DISCHARGE

## 2021-06-09 RX ORDER — LORATADINE 10 MG/1
0 TABLET ORAL
Qty: 0 | Refills: 5 | DISCHARGE

## 2021-06-09 RX ORDER — SOLIFENACIN SUCCINATE 10 MG/1
1 TABLET ORAL
Qty: 0 | Refills: 0 | DISCHARGE

## 2021-06-09 RX ORDER — ESTROGENS, CONJUGATED 0.625 MG/G
0 CREAM WITH APPLICATOR VAGINAL
Qty: 0 | Refills: 0 | DISCHARGE

## 2021-06-09 RX ORDER — LOSARTAN POTASSIUM 100 MG/1
0 TABLET, FILM COATED ORAL
Qty: 0 | Refills: 2 | DISCHARGE

## 2021-06-09 RX ORDER — MONTELUKAST 4 MG/1
0 TABLET, CHEWABLE ORAL
Qty: 0 | Refills: 4 | DISCHARGE

## 2021-06-09 RX ORDER — TRAMADOL HYDROCHLORIDE 50 MG/1
0 TABLET ORAL
Qty: 0 | Refills: 0 | DISCHARGE

## 2021-06-09 RX ORDER — FLUTICASONE PROPIONATE 220 MCG
2 AEROSOL WITH ADAPTER (GRAM) INHALATION
Qty: 0 | Refills: 0 | DISCHARGE

## 2021-06-09 RX ORDER — MECLIZINE HCL 12.5 MG
0 TABLET ORAL
Qty: 0 | Refills: 2 | DISCHARGE

## 2021-06-09 RX ORDER — ATORVASTATIN CALCIUM 80 MG/1
1 TABLET, FILM COATED ORAL
Qty: 0 | Refills: 0 | DISCHARGE

## 2021-06-09 RX ORDER — GABAPENTIN 400 MG/1
0 CAPSULE ORAL
Qty: 0 | Refills: 0 | DISCHARGE

## 2021-06-09 RX ORDER — DOCUSATE SODIUM 100 MG
0 CAPSULE ORAL
Qty: 0 | Refills: 0 | DISCHARGE

## 2021-06-09 NOTE — H&P PST ADULT - FALL HARM RISK TYPE OF ASSESSMENT
Interspace located. Using sterile technique, the area was anesthetized. The needle was slowly inserted and advanced at the appropriate angle into the subarachnoid space to allow CSF return. Stylet withdrawn. Cerebral Spinal Fluid was evaluated and any required specimens were drawn. Stylet replaced, needle removed, skin cleaned, sterile dressing applied. Patient placed in supine position. Admission

## 2021-06-09 NOTE — H&P PST ADULT - HISTORY OF PRESENT ILLNESS
76 y/o female with h/o arthritis to knee scheduled for right knee replacement. Denies any acute injury/trauma. Failed conservative therapy

## 2021-06-09 NOTE — H&P PST ADULT - ASSESSMENT
76 y/o female with right knee pain  Planned surgery.  Will obtain medical clearance  no covid testing reqiuired  Pre op instructions provided  Instructions provided on medications to continue and to take the day morning of surgery   76 y/o female with right knee pain- right knee replacement 6/16/21  Planned surgery.  Will obtain medical clearance-in chart  no covid testing reqiuired  Pre op instructions provided  Instructions provided on medications to continue and to take the day morning of surgery

## 2021-06-09 NOTE — H&P PST ADULT - NSICDXPASTSURGICALHX_GEN_ALL_CORE_FT
PAST SURGICAL HISTORY:  H/O tubal ligation     H/O tubal ligation     S/P hysterectomy     S/P total knee replacement, left     Varicose veins of right lower extremity s/p surgery

## 2021-06-09 NOTE — H&P PST ADULT - NSICDXPASTMEDICALHX_GEN_ALL_CORE_FT
PAST MEDICAL HISTORY:  Asthma controlled    COVID-19 vaccine series completed     Depression     Essential hypertension     H/O edema BLE    H/O gastroesophageal reflux (GERD)     H/O insomnia     Hyperlipidemia     Obesity     Overactive bladder     Vertigo

## 2021-06-10 LAB
MRSA PCR RESULT.: SIGNIFICANT CHANGE UP
S AUREUS DNA NOSE QL NAA+PROBE: SIGNIFICANT CHANGE UP

## 2021-06-11 ENCOUNTER — APPOINTMENT (OUTPATIENT)
Dept: ORTHOPEDIC SURGERY | Facility: CLINIC | Age: 76
End: 2021-06-11

## 2021-06-19 PROBLEM — Z92.29 PERSONAL HISTORY OF OTHER DRUG THERAPY: Chronic | Status: ACTIVE | Noted: 2021-06-09

## 2021-06-19 PROBLEM — Z87.898 PERSONAL HISTORY OF OTHER SPECIFIED CONDITIONS: Chronic | Status: ACTIVE | Noted: 2021-06-09

## 2021-06-19 PROBLEM — N32.81 OVERACTIVE BLADDER: Chronic | Status: ACTIVE | Noted: 2021-06-09

## 2021-07-01 ENCOUNTER — TRANSCRIPTION ENCOUNTER (OUTPATIENT)
Age: 76
End: 2021-07-01
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
## 2021-07-01 NOTE — PATIENT PROFILE ADULT - NSPROHMSYMPCOND_GEN_A_NUR
PN    Pt reports labile BPs, with mild h/a, and edema, with occas floaters. More concerned about LGA as EFW 99%, 9lbs 10oz. Considering primary LTC/S. Patient Vitals for the past 12 hrs:   Temp Pulse Resp BP   17 1810 98 °F (36.7 °C) 91 16 133/85   17 1344 98.1 °F (36.7 °C) 100 16 127/74   17 1232 - (!) 108 - (!) 138/95   17 1221 - (!) 102 - 139/87   17 1211 - (!) 108 - 128/89   17 1201 - (!) 110 - 130/85   17 1151 - (!) 113 - 135/88   17 1141 - (!) 113 - (!) 125/93   17 1131 98.7 °F (37.1 °C) (!) 115 14 136/80   17 1121 - (!) 111 - 147/83       Labs appear wnl except for p/c ratio 0.3    Reactive nst    A/P  1.  at 36w5d with GHTN. Transfer to antepartum to complete r/o of Pre-E  If 24hr prot> 300 (1130), plan delivery at 37wks. 2. LGA, nl Glucola, EFW 9lbs 10 oz ()  Pt unsure of ALEXANDER vs primary LTC/S. Will consider options and decide based on progress    3.  GBS pending musculoskeletal

## 2021-07-02 ENCOUNTER — INPATIENT (INPATIENT)
Facility: HOSPITAL | Age: 76
LOS: 0 days | Discharge: HOME | DRG: 470 | End: 2021-07-03
Attending: ORTHOPAEDIC SURGERY | Admitting: ORTHOPAEDIC SURGERY
Payer: COMMERCIAL

## 2021-07-02 ENCOUNTER — RESULT REVIEW (OUTPATIENT)
Age: 76
End: 2021-07-02

## 2021-07-02 ENCOUNTER — APPOINTMENT (OUTPATIENT)
Dept: ORTHOPEDIC SURGERY | Facility: HOSPITAL | Age: 76
End: 2021-07-02

## 2021-07-02 ENCOUNTER — TRANSCRIPTION ENCOUNTER (OUTPATIENT)
Age: 76
End: 2021-07-02

## 2021-07-02 VITALS
HEART RATE: 70 BPM | RESPIRATION RATE: 19 BRPM | WEIGHT: 197.53 LBS | DIASTOLIC BLOOD PRESSURE: 66 MMHG | TEMPERATURE: 98 F | SYSTOLIC BLOOD PRESSURE: 150 MMHG | HEIGHT: 66 IN | OXYGEN SATURATION: 97 %

## 2021-07-02 DIAGNOSIS — Z96.652 PRESENCE OF LEFT ARTIFICIAL KNEE JOINT: Chronic | ICD-10-CM

## 2021-07-02 DIAGNOSIS — I83.91 ASYMPTOMATIC VARICOSE VEINS OF RIGHT LOWER EXTREMITY: Chronic | ICD-10-CM

## 2021-07-02 DIAGNOSIS — Z90.710 ACQUIRED ABSENCE OF BOTH CERVIX AND UTERUS: Chronic | ICD-10-CM

## 2021-07-02 DIAGNOSIS — M17.11 UNILATERAL PRIMARY OSTEOARTHRITIS, RIGHT KNEE: ICD-10-CM

## 2021-07-02 DIAGNOSIS — Z98.51 TUBAL LIGATION STATUS: Chronic | ICD-10-CM

## 2021-07-02 LAB
BLD GP AB SCN SERPL QL: SIGNIFICANT CHANGE UP
HCT VFR BLD CALC: 31.7 % — LOW (ref 34.5–45)
HGB BLD-MCNC: 10.2 G/DL — LOW (ref 11.5–15.5)
MCHC RBC-ENTMCNC: 28.4 PG — SIGNIFICANT CHANGE UP (ref 27–34)
MCHC RBC-ENTMCNC: 32.2 GM/DL — SIGNIFICANT CHANGE UP (ref 32–36)
MCV RBC AUTO: 88.3 FL — SIGNIFICANT CHANGE UP (ref 80–100)
NRBC # BLD: 0 /100 WBCS — SIGNIFICANT CHANGE UP (ref 0–0)
PLATELET # BLD AUTO: 195 K/UL — SIGNIFICANT CHANGE UP (ref 150–400)
RBC # BLD: 3.59 M/UL — LOW (ref 3.8–5.2)
RBC # FLD: 13.4 % — SIGNIFICANT CHANGE UP (ref 10.3–14.5)
WBC # BLD: 8.56 K/UL — SIGNIFICANT CHANGE UP (ref 3.8–10.5)
WBC # FLD AUTO: 8.56 K/UL — SIGNIFICANT CHANGE UP (ref 3.8–10.5)

## 2021-07-02 PROCEDURE — 88305 TISSUE EXAM BY PATHOLOGIST: CPT | Mod: 26

## 2021-07-02 PROCEDURE — 27447 TOTAL KNEE ARTHROPLASTY: CPT | Mod: RT

## 2021-07-02 PROCEDURE — 73562 X-RAY EXAM OF KNEE 3: CPT | Mod: 26,RT

## 2021-07-02 PROCEDURE — 99223 1ST HOSP IP/OBS HIGH 75: CPT

## 2021-07-02 PROCEDURE — 88311 DECALCIFY TISSUE: CPT | Mod: 26

## 2021-07-02 RX ORDER — ONDANSETRON 8 MG/1
4 TABLET, FILM COATED ORAL ONCE
Refills: 0 | Status: DISCONTINUED | OUTPATIENT
Start: 2021-07-02 | End: 2021-07-02

## 2021-07-02 RX ORDER — ESTRADIOL 4 UG/1
0 INSERT VAGINAL
Qty: 0 | Refills: 10 | DISCHARGE

## 2021-07-02 RX ORDER — HYDROMORPHONE HYDROCHLORIDE 2 MG/ML
0.5 INJECTION INTRAMUSCULAR; INTRAVENOUS; SUBCUTANEOUS
Refills: 0 | Status: DISCONTINUED | OUTPATIENT
Start: 2021-07-02 | End: 2021-07-03

## 2021-07-02 RX ORDER — TRANEXAMIC ACID 100 MG/ML
1000 INJECTION, SOLUTION INTRAVENOUS ONCE
Refills: 0 | Status: COMPLETED | OUTPATIENT
Start: 2021-07-02 | End: 2021-07-02

## 2021-07-02 RX ORDER — CELECOXIB 200 MG/1
1 CAPSULE ORAL
Qty: 60 | Refills: 0
Start: 2021-07-02 | End: 2021-07-31

## 2021-07-02 RX ORDER — MONTELUKAST 4 MG/1
1 TABLET, CHEWABLE ORAL
Qty: 0 | Refills: 0 | DISCHARGE

## 2021-07-02 RX ORDER — HYDROMORPHONE HYDROCHLORIDE 2 MG/ML
4 INJECTION INTRAMUSCULAR; INTRAVENOUS; SUBCUTANEOUS
Refills: 0 | Status: DISCONTINUED | OUTPATIENT
Start: 2021-07-02 | End: 2021-07-03

## 2021-07-02 RX ORDER — MIRTAZAPINE 45 MG/1
0 TABLET, ORALLY DISINTEGRATING ORAL
Qty: 0 | Refills: 0 | DISCHARGE

## 2021-07-02 RX ORDER — ACETAMINOPHEN 500 MG
1000 TABLET ORAL ONCE
Refills: 0 | Status: COMPLETED | OUTPATIENT
Start: 2021-07-02 | End: 2021-07-02

## 2021-07-02 RX ORDER — ALBUTEROL 90 UG/1
2 AEROSOL, METERED ORAL EVERY 6 HOURS
Refills: 0 | Status: DISCONTINUED | OUTPATIENT
Start: 2021-07-02 | End: 2021-07-03

## 2021-07-02 RX ORDER — FLUTICASONE PROPIONATE 50 MCG
1 SPRAY, SUSPENSION NASAL
Qty: 0 | Refills: 0 | DISCHARGE

## 2021-07-02 RX ORDER — SOD,AMMONIUM,POTASSIUM LACTATE
1 CREAM (GRAM) TOPICAL
Qty: 0 | Refills: 0 | DISCHARGE

## 2021-07-02 RX ORDER — APIXABAN 2.5 MG/1
2.5 TABLET, FILM COATED ORAL EVERY 12 HOURS
Refills: 0 | Status: DISCONTINUED | OUTPATIENT
Start: 2021-07-03 | End: 2021-07-03

## 2021-07-02 RX ORDER — BUPROPION HYDROCHLORIDE 150 MG/1
1 TABLET, EXTENDED RELEASE ORAL
Qty: 0 | Refills: 0 | DISCHARGE

## 2021-07-02 RX ORDER — MOMETASONE FUROATE 1 MG/G
1 CREAM TOPICAL
Qty: 0 | Refills: 0 | DISCHARGE

## 2021-07-02 RX ORDER — MIRTAZAPINE 45 MG/1
15 TABLET, ORALLY DISINTEGRATING ORAL AT BEDTIME
Refills: 0 | Status: DISCONTINUED | OUTPATIENT
Start: 2021-07-02 | End: 2021-07-03

## 2021-07-02 RX ORDER — CELECOXIB 200 MG/1
200 CAPSULE ORAL EVERY 12 HOURS
Refills: 0 | Status: DISCONTINUED | OUTPATIENT
Start: 2021-07-03 | End: 2021-07-03

## 2021-07-02 RX ORDER — CHOLECALCIFEROL (VITAMIN D3) 125 MCG
0 CAPSULE ORAL
Qty: 0 | Refills: 0 | DISCHARGE

## 2021-07-02 RX ORDER — NIFEDIPINE 30 MG
1 TABLET, EXTENDED RELEASE 24 HR ORAL
Qty: 0 | Refills: 0 | DISCHARGE

## 2021-07-02 RX ORDER — ASPIRIN/CALCIUM CARB/MAGNESIUM 324 MG
1 TABLET ORAL
Qty: 56 | Refills: 0
Start: 2021-07-02 | End: 2021-07-29

## 2021-07-02 RX ORDER — APREPITANT 80 MG/1
40 CAPSULE ORAL ONCE
Refills: 0 | Status: COMPLETED | OUTPATIENT
Start: 2021-07-02 | End: 2021-07-02

## 2021-07-02 RX ORDER — POTASSIUM CHLORIDE 20 MEQ
1 PACKET (EA) ORAL
Qty: 0 | Refills: 0 | DISCHARGE

## 2021-07-02 RX ORDER — PANTOPRAZOLE SODIUM 20 MG/1
40 TABLET, DELAYED RELEASE ORAL
Refills: 0 | Status: DISCONTINUED | OUTPATIENT
Start: 2021-07-02 | End: 2021-07-03

## 2021-07-02 RX ORDER — ATORVASTATIN CALCIUM 80 MG/1
0 TABLET, FILM COATED ORAL
Qty: 0 | Refills: 4 | DISCHARGE

## 2021-07-02 RX ORDER — FUROSEMIDE 40 MG
0 TABLET ORAL
Qty: 0 | Refills: 0 | DISCHARGE

## 2021-07-02 RX ORDER — CEFAZOLIN SODIUM 1 G
2000 VIAL (EA) INJECTION EVERY 8 HOURS
Refills: 0 | Status: COMPLETED | OUTPATIENT
Start: 2021-07-02 | End: 2021-07-02

## 2021-07-02 RX ORDER — DIPHENHYDRAMINE HCL 50 MG
0 CAPSULE ORAL
Qty: 0 | Refills: 1 | DISCHARGE

## 2021-07-02 RX ORDER — OXYBUTYNIN CHLORIDE 5 MG
0 TABLET ORAL
Qty: 0 | Refills: 10 | DISCHARGE

## 2021-07-02 RX ORDER — SENNA PLUS 8.6 MG/1
2 TABLET ORAL AT BEDTIME
Refills: 0 | Status: DISCONTINUED | OUTPATIENT
Start: 2021-07-02 | End: 2021-07-03

## 2021-07-02 RX ORDER — MONTELUKAST 4 MG/1
10 TABLET, CHEWABLE ORAL AT BEDTIME
Refills: 0 | Status: DISCONTINUED | OUTPATIENT
Start: 2021-07-02 | End: 2021-07-03

## 2021-07-02 RX ORDER — ACETAMINOPHEN 500 MG
0 TABLET ORAL
Qty: 0 | Refills: 3 | DISCHARGE

## 2021-07-02 RX ORDER — POLYETHYLENE GLYCOL 3350 17 G/17G
17 POWDER, FOR SOLUTION ORAL
Qty: 0 | Refills: 0 | DISCHARGE
Start: 2021-07-02

## 2021-07-02 RX ORDER — CHLORHEXIDINE GLUCONATE 213 G/1000ML
1 SOLUTION TOPICAL ONCE
Refills: 0 | Status: COMPLETED | OUTPATIENT
Start: 2021-07-02 | End: 2021-07-02

## 2021-07-02 RX ORDER — OXYBUTYNIN CHLORIDE 5 MG
5 TABLET ORAL EVERY 8 HOURS
Refills: 0 | Status: DISCONTINUED | OUTPATIENT
Start: 2021-07-02 | End: 2021-07-03

## 2021-07-02 RX ORDER — VITAMIN E 100 UNIT
1 CAPSULE ORAL
Qty: 0 | Refills: 0 | DISCHARGE

## 2021-07-02 RX ORDER — MAGNESIUM HYDROXIDE 400 MG/1
30 TABLET, CHEWABLE ORAL DAILY
Refills: 0 | Status: DISCONTINUED | OUTPATIENT
Start: 2021-07-02 | End: 2021-07-03

## 2021-07-02 RX ORDER — RISPERIDONE 4 MG/1
1.5 TABLET ORAL AT BEDTIME
Refills: 0 | Status: DISCONTINUED | OUTPATIENT
Start: 2021-07-02 | End: 2021-07-03

## 2021-07-02 RX ORDER — FAMOTIDINE 10 MG/ML
0 INJECTION INTRAVENOUS
Qty: 0 | Refills: 0 | DISCHARGE

## 2021-07-02 RX ORDER — APIXABAN 2.5 MG/1
1 TABLET, FILM COATED ORAL
Qty: 23 | Refills: 0
Start: 2021-07-02 | End: 2021-07-13

## 2021-07-02 RX ORDER — DICLOFENAC SODIUM 30 MG/G
0 GEL TOPICAL
Qty: 0 | Refills: 0 | DISCHARGE

## 2021-07-02 RX ORDER — RISPERIDONE 4 MG/1
0 TABLET ORAL
Qty: 0 | Refills: 0 | DISCHARGE

## 2021-07-02 RX ORDER — SODIUM CHLORIDE 9 MG/ML
1000 INJECTION, SOLUTION INTRAVENOUS
Refills: 0 | Status: DISCONTINUED | OUTPATIENT
Start: 2021-07-02 | End: 2021-07-03

## 2021-07-02 RX ORDER — ACETAMINOPHEN 500 MG
1000 TABLET ORAL ONCE
Refills: 0 | Status: COMPLETED | OUTPATIENT
Start: 2021-07-03 | End: 2021-07-03

## 2021-07-02 RX ORDER — HYDROMORPHONE HYDROCHLORIDE 2 MG/ML
2 INJECTION INTRAMUSCULAR; INTRAVENOUS; SUBCUTANEOUS
Refills: 0 | Status: DISCONTINUED | OUTPATIENT
Start: 2021-07-02 | End: 2021-07-03

## 2021-07-02 RX ORDER — NIFEDIPINE 30 MG
90 TABLET, EXTENDED RELEASE 24 HR ORAL DAILY
Refills: 0 | Status: DISCONTINUED | OUTPATIENT
Start: 2021-07-02 | End: 2021-07-03

## 2021-07-02 RX ORDER — LORATADINE 10 MG/1
1 TABLET ORAL
Qty: 0 | Refills: 0 | DISCHARGE

## 2021-07-02 RX ORDER — ACETAMINOPHEN 500 MG
2 TABLET ORAL
Qty: 0 | Refills: 0 | DISCHARGE
Start: 2021-07-02

## 2021-07-02 RX ORDER — ALBUTEROL 90 UG/1
2 AEROSOL, METERED ORAL
Qty: 0 | Refills: 0 | DISCHARGE

## 2021-07-02 RX ORDER — SODIUM CHLORIDE 9 MG/ML
500 INJECTION INTRAMUSCULAR; INTRAVENOUS; SUBCUTANEOUS ONCE
Refills: 0 | Status: COMPLETED | OUTPATIENT
Start: 2021-07-02 | End: 2021-07-02

## 2021-07-02 RX ORDER — MECLIZINE HCL 12.5 MG
1 TABLET ORAL
Qty: 0 | Refills: 0 | DISCHARGE

## 2021-07-02 RX ORDER — BUPROPION HYDROCHLORIDE 150 MG/1
150 TABLET, EXTENDED RELEASE ORAL DAILY
Refills: 0 | Status: DISCONTINUED | OUTPATIENT
Start: 2021-07-03 | End: 2021-07-03

## 2021-07-02 RX ORDER — CEFAZOLIN SODIUM 1 G
2000 VIAL (EA) INJECTION ONCE
Refills: 0 | Status: COMPLETED | OUTPATIENT
Start: 2021-07-02 | End: 2021-07-02

## 2021-07-02 RX ORDER — ACETAMINOPHEN 500 MG
1000 TABLET ORAL EVERY 8 HOURS
Refills: 0 | Status: DISCONTINUED | OUTPATIENT
Start: 2021-07-03 | End: 2021-07-03

## 2021-07-02 RX ORDER — ATORVASTATIN CALCIUM 80 MG/1
10 TABLET, FILM COATED ORAL AT BEDTIME
Refills: 0 | Status: DISCONTINUED | OUTPATIENT
Start: 2021-07-02 | End: 2021-07-03

## 2021-07-02 RX ORDER — SODIUM CHLORIDE 9 MG/ML
1000 INJECTION, SOLUTION INTRAVENOUS
Refills: 0 | Status: DISCONTINUED | OUTPATIENT
Start: 2021-07-02 | End: 2021-07-02

## 2021-07-02 RX ORDER — PANTOPRAZOLE SODIUM 20 MG/1
0 TABLET, DELAYED RELEASE ORAL
Qty: 0 | Refills: 5 | DISCHARGE

## 2021-07-02 RX ORDER — SENNA PLUS 8.6 MG/1
0 TABLET ORAL
Qty: 0 | Refills: 5 | DISCHARGE

## 2021-07-02 RX ORDER — HYDROMORPHONE HYDROCHLORIDE 2 MG/ML
0.5 INJECTION INTRAMUSCULAR; INTRAVENOUS; SUBCUTANEOUS
Refills: 0 | Status: DISCONTINUED | OUTPATIENT
Start: 2021-07-02 | End: 2021-07-02

## 2021-07-02 RX ORDER — FLUTICASONE PROPIONATE 50 MCG
1 SPRAY, SUSPENSION NASAL ONCE
Refills: 0 | Status: COMPLETED | OUTPATIENT
Start: 2021-07-02 | End: 2021-07-02

## 2021-07-02 RX ORDER — POLYETHYLENE GLYCOL 3350 17 G/17G
17 POWDER, FOR SOLUTION ORAL AT BEDTIME
Refills: 0 | Status: DISCONTINUED | OUTPATIENT
Start: 2021-07-02 | End: 2021-07-03

## 2021-07-02 RX ORDER — ONDANSETRON 8 MG/1
4 TABLET, FILM COATED ORAL EVERY 6 HOURS
Refills: 0 | Status: DISCONTINUED | OUTPATIENT
Start: 2021-07-02 | End: 2021-07-03

## 2021-07-02 RX ORDER — HYDROCORTISONE 1 %
1 OINTMENT (GRAM) TOPICAL
Qty: 0 | Refills: 0 | DISCHARGE

## 2021-07-02 RX ORDER — FLUTICASONE PROPIONATE 220 MCG
0 AEROSOL WITH ADAPTER (GRAM) INHALATION
Qty: 0 | Refills: 5 | DISCHARGE

## 2021-07-02 RX ADMIN — HYDROMORPHONE HYDROCHLORIDE 4 MILLIGRAM(S): 2 INJECTION INTRAMUSCULAR; INTRAVENOUS; SUBCUTANEOUS at 17:10

## 2021-07-02 RX ADMIN — Medication 1 SPRAY(S): at 17:43

## 2021-07-02 RX ADMIN — APREPITANT 40 MILLIGRAM(S): 80 CAPSULE ORAL at 07:08

## 2021-07-02 RX ADMIN — HYDROMORPHONE HYDROCHLORIDE 2 MILLIGRAM(S): 2 INJECTION INTRAMUSCULAR; INTRAVENOUS; SUBCUTANEOUS at 22:35

## 2021-07-02 RX ADMIN — Medication 1000 MILLIGRAM(S): at 14:00

## 2021-07-02 RX ADMIN — SODIUM CHLORIDE 125 MILLILITER(S): 9 INJECTION, SOLUTION INTRAVENOUS at 13:00

## 2021-07-02 RX ADMIN — HYDROMORPHONE HYDROCHLORIDE 2 MILLIGRAM(S): 2 INJECTION INTRAMUSCULAR; INTRAVENOUS; SUBCUTANEOUS at 22:01

## 2021-07-02 RX ADMIN — SODIUM CHLORIDE 75 MILLILITER(S): 9 INJECTION, SOLUTION INTRAVENOUS at 11:20

## 2021-07-02 RX ADMIN — Medication 400 MILLIGRAM(S): at 14:00

## 2021-07-02 RX ADMIN — Medication 100 MILLIGRAM(S): at 23:56

## 2021-07-02 RX ADMIN — MONTELUKAST 10 MILLIGRAM(S): 4 TABLET, CHEWABLE ORAL at 21:33

## 2021-07-02 RX ADMIN — Medication 400 MILLIGRAM(S): at 21:33

## 2021-07-02 RX ADMIN — Medication 5 MILLIGRAM(S): at 17:43

## 2021-07-02 RX ADMIN — SODIUM CHLORIDE 125 MILLILITER(S): 9 INJECTION, SOLUTION INTRAVENOUS at 21:33

## 2021-07-02 RX ADMIN — ATORVASTATIN CALCIUM 10 MILLIGRAM(S): 80 TABLET, FILM COATED ORAL at 21:33

## 2021-07-02 RX ADMIN — Medication 5 MILLIGRAM(S): at 21:33

## 2021-07-02 RX ADMIN — HYDROMORPHONE HYDROCHLORIDE 2 MILLIGRAM(S): 2 INJECTION INTRAMUSCULAR; INTRAVENOUS; SUBCUTANEOUS at 12:59

## 2021-07-02 RX ADMIN — Medication 100 MILLIGRAM(S): at 16:28

## 2021-07-02 RX ADMIN — HYDROMORPHONE HYDROCHLORIDE 2 MILLIGRAM(S): 2 INJECTION INTRAMUSCULAR; INTRAVENOUS; SUBCUTANEOUS at 13:30

## 2021-07-02 RX ADMIN — SODIUM CHLORIDE 500 MILLILITER(S): 9 INJECTION INTRAMUSCULAR; INTRAVENOUS; SUBCUTANEOUS at 11:30

## 2021-07-02 RX ADMIN — HYDROMORPHONE HYDROCHLORIDE 0.5 MILLIGRAM(S): 2 INJECTION INTRAMUSCULAR; INTRAVENOUS; SUBCUTANEOUS at 11:33

## 2021-07-02 RX ADMIN — Medication 1000 MILLIGRAM(S): at 22:41

## 2021-07-02 RX ADMIN — HYDROMORPHONE HYDROCHLORIDE 0.5 MILLIGRAM(S): 2 INJECTION INTRAMUSCULAR; INTRAVENOUS; SUBCUTANEOUS at 12:21

## 2021-07-02 RX ADMIN — HYDROMORPHONE HYDROCHLORIDE 4 MILLIGRAM(S): 2 INJECTION INTRAMUSCULAR; INTRAVENOUS; SUBCUTANEOUS at 16:34

## 2021-07-02 RX ADMIN — CHLORHEXIDINE GLUCONATE 1 APPLICATION(S): 213 SOLUTION TOPICAL at 07:08

## 2021-07-02 NOTE — DISCHARGE NOTE NURSING/CASE MANAGEMENT/SOCIAL WORK - PATIENT PORTAL LINK FT
You can access the FollowMyHealth Patient Portal offered by [facility] by registering at the following website: [email or web address]. By joining [employer]’s FollowMyHealth portal, you will also be able to view your health information using other applications (apps) compatible with our system.

## 2021-07-02 NOTE — DISCHARGE NOTE PROVIDER - NSDCMRMEDTOKEN_GEN_ALL_CORE_FT
ACETAMINOPHEN 500MG E/S CAPLETS: TAKE 1 TABLET BY MOUTH THREE TIMES DAILY AS NEEDED FOR PAIN  albuterol 90 mcg/inh inhalation aerosol: 2 puff(s) inhaled every 6 hours, As Needed  ammonium lactate 12% topical lotion: Apply topically to affected area once a day, As Needed  ASPIRIN 81MG EC LOW DOSE TABLETS: TAKE 1 TABLET BY MOUTH DAILY  ATORVASTATIN 10MG TABLETS: TAKE 1 TABLET BY MOUTH EVERY NIGHT AT BEDTIME  BANOPHEN (DIPHENHYDRAMINE) 25MG CP: TAKE 2 CAPSULES BY MOUTH AT BEDTIME  buPROPion 150 mg/12 hours (SR) oral tablet, extended release: 1 tab(s) orally once a day  calcium-vitamin D 500 mg-200 intl units (5 mcg) oral tablet: 1 tab(s) orally once a day  DICLOFENAC 1% GEL 100GM: APPLY EXTERNALLY TO THE AFFECTED AREA DAILY  docusate sodium 100 mg oral capsule: 1 cap(s) orally 3 times a day for constipation  ESTRADIOL 0.01% VAG CREAM 42.5GM: gram(s) vaginal once a day, As Needed  FAMOTIDINE 40MG TABLETS: TAKE 1 TABLET BY MOUTH EVERY NIGHT AS NEEDED  Flonase 50 mcg/inh nasal spray: 1 spray(s) nasal once a day  FLOVENT HFA 44MCG ORAL INH 120INH: gram(s) inhaled , As Needed  FUROSEMIDE 20MG TABLETS: TAKE 1 TABLET BY MOUTH DAILY AS NEEDED  Hydrocort 2.5% topical cream: Apply topically to affected area 2 times a day  loratadine 10 mg oral tablet: 1 tab(s) orally once a day  meclizine 12.5 mg oral tablet: 3 times a day  MIRTAZAPINE 15MG TABLETS: TAKE 1 TABLET BY MOUTH EVERY NIGHT AT BEDTIME FOR MAJOR DEPRESSION  mometasone 0.1% topical cream: Apply topically to affected area once a day, As Needed  montelukast 10 mg oral tablet: 1 tab(s) orally once a day (at bedtime)  NIFEdipine 90 mg oral tablet, extended release: 1 tab(s) orally once a day  OXYBUTYNIN ER 15MG TABLETS: TAKE 1 TABLET BY MOUTH EVERY DAY  PANTOPRAZOLE 40MG TABLETS: TAKE 1 TABLET BY MOUTH EVERY DAY  potassium chloride 10 mEq oral tablet, extended release: 1 tab(s) orally 2 times a week  RISPERIDONE 1MG TABLETS: TAKE 1 AND HALF TABLET BY MOUTH AT BEDTIME  SENNA 8.6MG TABLETS: TAKE 2 TABLETS BY MOUTH TWICE DAILY  VITAMIN D3 1000 UNIT TABLETS: TAKE 1 TABLET BY MOUTH EVERY DAY  vitamin E alpha 100 intl units oral capsule: 1 cap(s) orally once a day, last dose 1/7/20   acetaminophen 500 mg oral tablet: 2 tab(s) orally every 8 hours  albuterol 90 mcg/inh inhalation aerosol: 2 puff(s) inhaled every 6 hours, As Needed  ammonium lactate 12% topical lotion: Apply topically to affected area once a day, As Needed  Aspirin Enteric Coated 81 mg oral delayed release tablet: Start 1 tab orally every 12 hours for 28 days, once Eliquis is completed.  ATORVASTATIN 10MG TABLETS: TAKE 1 TABLET BY MOUTH EVERY NIGHT AT BEDTIME  BANOPHEN (DIPHENHYDRAMINE) 25MG CP: TAKE 2 CAPSULES BY MOUTH AT BEDTIME  buPROPion 150 mg/12 hours (SR) oral tablet, extended release: 1 tab(s) orally once a day  calcium-vitamin D 500 mg-200 intl units (5 mcg) oral tablet: 1 tab(s) orally once a day  celecoxib 200 mg oral capsule: 1 cap orally every 12 hours.  Dilaudid 2 mg oral tablet: 1 tab(s) orally every 4 hours MDD:6 tablets  docusate sodium 100 mg oral capsule: 1 cap(s) orally 3 times a day for constipation  Eliquis 2.5 mg oral tablet: 1 tab orally every 12 hours. Change to Aspirin 81mg every 12 hours for 28 days, once Eliquis is completed.   ESTRADIOL 0.01% VAG CREAM 42.5GM: gram(s) vaginal once a day, As Needed.  resume in 6 weeks   FAMOTIDINE 40MG TABLETS: TAKE 1 TABLET BY MOUTH EVERY NIGHT AS NEEDED  Flonase 50 mcg/inh nasal spray: 1 spray(s) nasal once a day  FLOVENT HFA 44MCG ORAL INH 120INH: gram(s) inhaled , As Needed  FUROSEMIDE 20MG TABLETS: TAKE 1 TABLET BY MOUTH DAILY AS NEEDED  Hydrocort 2.5% topical cream: Apply topically to affected area 2 times a day  loratadine 10 mg oral tablet: 1 tab(s) orally once a day  meclizine 12.5 mg oral tablet: 3 times a day  MIRTAZAPINE 15MG TABLETS: TAKE 1 TABLET BY MOUTH EVERY NIGHT AT BEDTIME FOR MAJOR DEPRESSION  mometasone 0.1% topical cream: Apply topically to affected area once a day, As Needed  montelukast 10 mg oral tablet: 1 tab(s) orally once a day (at bedtime)  NIFEdipine 90 mg oral tablet, extended release: 1 tab(s) orally once a day  OXYBUTYNIN ER 15MG TABLETS: TAKE 1 TABLET BY MOUTH EVERY DAY  PANTOPRAZOLE 40MG TABLETS: TAKE 1 TABLET BY MOUTH EVERY DAY  polyethylene glycol 3350 oral powder for reconstitution: 17 gram(s) orally once a day (at bedtime)  potassium chloride 10 mEq oral tablet, extended release: 1 tab(s) orally 2 times a week  RISPERIDONE 1MG TABLETS: TAKE 1 AND HALF TABLET BY MOUTH AT BEDTIME  SENNA 8.6MG TABLETS: TAKE 2 TABLETS BY MOUTH TWICE DAILY  VITAMIN D3 1000 UNIT TABLETS: TAKE 1 TABLET BY MOUTH EVERY DAY  vitamin E alpha 100 intl units oral capsule: 1 cap(s) orally once a day, last dose 1/7/20   acetaminophen 500 mg oral tablet: 2 tab(s) orally every 8 hours  albuterol 90 mcg/inh inhalation aerosol: 2 puff(s) inhaled every 6 hours, As Needed  ammonium lactate 12% topical lotion: Apply topically to affected area once a day, As Needed  Aspirin Enteric Coated 81 mg oral delayed release tablet: Start 1 tab orally every 12 hours for 28 days, once Eliquis is completed.  ATORVASTATIN 10MG TABLETS: TAKE 1 TABLET BY MOUTH EVERY NIGHT AT BEDTIME  BANOPHEN (DIPHENHYDRAMINE) 25MG CP: TAKE 2 CAPSULES BY MOUTH AT BEDTIME  buPROPion 150 mg/12 hours (SR) oral tablet, extended release: 1 tab(s) orally once a day  calcium-vitamin D 500 mg-200 intl units (5 mcg) oral tablet: 1 tab(s) orally once a day  celecoxib 200 mg oral capsule: 1 cap orally every 12 hours.  Dilaudid 4 mg oral tablet: 1 tab(s) orally every 4 hours MDD:6 tablets  docusate sodium 100 mg oral capsule: 1 cap(s) orally 3 times a day for constipation  Eliquis 2.5 mg oral tablet: 1 tab orally every 12 hours. Change to Aspirin 81mg every 12 hours for 28 days, once Eliquis is completed.   ESTRADIOL 0.01% VAG CREAM 42.5GM: gram(s) vaginal once a day, As Needed.  resume in 6 weeks   FAMOTIDINE 40MG TABLETS: TAKE 1 TABLET BY MOUTH EVERY NIGHT AS NEEDED  Flonase 50 mcg/inh nasal spray: 1 spray(s) nasal once a day  FLOVENT HFA 44MCG ORAL INH 120INH: gram(s) inhaled , As Needed  FUROSEMIDE 20MG TABLETS: TAKE 1 TABLET BY MOUTH DAILY AS NEEDED  Hydrocort 2.5% topical cream: Apply topically to affected area 2 times a day  loratadine 10 mg oral tablet: 1 tab(s) orally once a day  meclizine 12.5 mg oral tablet: 3 times a day  MIRTAZAPINE 15MG TABLETS: TAKE 1 TABLET BY MOUTH EVERY NIGHT AT BEDTIME FOR MAJOR DEPRESSION  mometasone 0.1% topical cream: Apply topically to affected area once a day, As Needed  montelukast 10 mg oral tablet: 1 tab(s) orally once a day (at bedtime)  NIFEdipine 90 mg oral tablet, extended release: 1 tab(s) orally once a day  OXYBUTYNIN ER 15MG TABLETS: TAKE 1 TABLET BY MOUTH EVERY DAY  PANTOPRAZOLE 40MG TABLETS: TAKE 1 TABLET BY MOUTH EVERY DAY  polyethylene glycol 3350 oral powder for reconstitution: 17 gram(s) orally once a day (at bedtime)  potassium chloride 10 mEq oral tablet, extended release: 1 tab(s) orally 2 times a week  RISPERIDONE 1MG TABLETS: TAKE 1 AND HALF TABLET BY MOUTH AT BEDTIME  SENNA 8.6MG TABLETS: TAKE 2 TABLETS BY MOUTH TWICE DAILY  VITAMIN D3 1000 UNIT TABLETS: TAKE 1 TABLET BY MOUTH EVERY DAY  vitamin E alpha 100 intl units oral capsule: 1 cap(s) orally once a day, last dose 1/7/20

## 2021-07-02 NOTE — DISCHARGE NOTE PROVIDER - NSDCCPCAREPLAN_GEN_ALL_CORE_FT
PRINCIPAL DISCHARGE DIAGNOSIS  Diagnosis: Primary osteoarthritis of right knee  Assessment and Plan of Treatment: right TKA  Physical Therapy/Occupational Therapy for: ambulation, transfers, stairs, ADL's (activities of daily living), range of motion exercises, and isometrics  -Activity  • Weight Bearing as tolerated with rolling walker.  • Take short, frequent walks increasing the distance that you walk each day as tolerated.  • Change your position every hour to decrease pain and stiffness.  • Continue the exercises taught to you by your physical therapist.  • No driving until cleared by the doctor.  • No tub baths, hot tubs, or swimming pools until instructed by your doctor.  • Do not squat down on the floor.  • Do not kneel or twist your knee.  • Range of Motion Goals: Flexion= 120 degrees, Extension = 0 degrees  Keep incision clean and dry. May shower 5 days after surgery if no drainage from incision.  Suture/Prineo removal 2 weeks after surgery at Surgeon's office.

## 2021-07-02 NOTE — CONSULT NOTE ADULT - SUBJECTIVE AND OBJECTIVE BOX
Patient is a 75y old  Female who presents with a chief complaint of right knee pain-- for right TKA (02 Jul 2021 13:45)  76 y/o female with h/o arthritis to knee scheduled for right knee replacement. Denies any acute injury/trauma. Failed conservative therapy     HPI:  Patient is seen and examined.        PAST MEDICAL & SURGICAL HISTORY:  Essential hypertension    Asthma  controlled    Hyperlipidemia    Vertigo    H/O gastroesophageal reflux (GERD)    Depression    Obesity    COVID-19 vaccine series completed    H/O insomnia    H/O edema  BLE    Overactive bladder    H/O tubal ligation    S/P total knee replacement, left    S/P hysterectomy    H/O tubal ligation    Varicose veins of right lower extremity  s/p surgery        MEDICATIONS  (STANDING):  acetaminophen  IVPB .. 1000 milliGRAM(s) IV Intermittent once  atorvastatin 10 milliGRAM(s) Oral at bedtime  ceFAZolin   IVPB 2000 milliGRAM(s) IV Intermittent every 8 hours  fluticasone propionate 50 MICROgram(s)/spray Nasal Spray 1 Spray(s) Both Nostrils once  lactated ringers. 1000 milliLiter(s) (125 mL/Hr) IV Continuous <Continuous>  mirtazapine 15 milliGRAM(s) Oral at bedtime  montelukast 10 milliGRAM(s) Oral at bedtime  NIFEdipine XL 90 milliGRAM(s) Oral daily  oxybutynin 5 milliGRAM(s) Oral every 8 hours  pantoprazole    Tablet 40 milliGRAM(s) Oral before breakfast  polyethylene glycol 3350 17 Gram(s) Oral at bedtime  risperiDONE   Tablet 1.5 milliGRAM(s) Oral at bedtime  senna 2 Tablet(s) Oral at bedtime    MEDICATIONS  (PRN):  ALBUTerol    90 MICROgram(s) HFA Inhaler 2 Puff(s) Inhalation every 6 hours PRN Bronchospasm  HYDROmorphone   Tablet 2 milliGRAM(s) Oral every 3 hours PRN Moderate Pain (4 - 6)  HYDROmorphone   Tablet 4 milliGRAM(s) Oral every 3 hours PRN Severe Pain (7 - 10)  HYDROmorphone  Injectable 0.5 milliGRAM(s) IV Push every 3 hours PRN breakthrough pain  magnesium hydroxide Suspension 30 milliLiter(s) Oral daily PRN Constipation  ondansetron Injectable 4 milliGRAM(s) IV Push every 6 hours PRN Nausea and/or Vomiting      Allergies    No Known Allergies    Intolerances    SOCIAL HISTORY:  Smoker:  YES / NO        PACK YEARS:                         WHEN QUIT?  ETOH use:  YES / NO               FREQUENCY / QUANTITY:  Ilicit Drug use:  YES / NO  Occupation:  Assisted device use (Cane / Walker):  Live with:      FAMILY HISTORY:  FH: HTN (hypertension) (Mother)        Vital Signs Last 24 Hrs  T(C): 36.6 (02 Jul 2021 12:15), Max: 36.6 (02 Jul 2021 12:15)  T(F): 97.8 (02 Jul 2021 12:15), Max: 97.8 (02 Jul 2021 12:15)  HR: 69 (02 Jul 2021 12:15) (59 - 70)  BP: 113/67 (02 Jul 2021 12:15) (105/58 - 150/66)  BP(mean): --  RR: 16 (02 Jul 2021 12:15) (9 - 19)  SpO2: 98% (02 Jul 2021 12:15) (97% - 100%)            LABS:              CAPILLARY BLOOD GLUCOSE          RADIOLOGY & ADDITIONAL STUDIES: Patient is a 75y old  Female who presents with a chief complaint of right knee pain-- for right TKA (02 Jul 2021 13:45)  74 y/o female with h/o arthritis to knee scheduled for right knee replacement. Denies any acute injury/trauma. Failed conservative therapy     HPI:  Patient is seen and examined.  c/o p[ain at surgical site.        PAST MEDICAL & SURGICAL HISTORY:  Essential hypertension    Asthma  controlled    Hyperlipidemia    Vertigo    H/O gastroesophageal reflux (GERD)    Depression    Obesity    COVID-19 vaccine series completed    H/O insomnia    H/O edema  BLE    Overactive bladder    H/O tubal ligation    S/P total knee replacement, left    S/P hysterectomy    H/O tubal ligation    Varicose veins of right lower extremity  s/p surgery        MEDICATIONS  (STANDING):  acetaminophen  IVPB .. 1000 milliGRAM(s) IV Intermittent once  atorvastatin 10 milliGRAM(s) Oral at bedtime  ceFAZolin   IVPB 2000 milliGRAM(s) IV Intermittent every 8 hours  fluticasone propionate 50 MICROgram(s)/spray Nasal Spray 1 Spray(s) Both Nostrils once  lactated ringers. 1000 milliLiter(s) (125 mL/Hr) IV Continuous <Continuous>  mirtazapine 15 milliGRAM(s) Oral at bedtime  montelukast 10 milliGRAM(s) Oral at bedtime  NIFEdipine XL 90 milliGRAM(s) Oral daily  oxybutynin 5 milliGRAM(s) Oral every 8 hours  pantoprazole    Tablet 40 milliGRAM(s) Oral before breakfast  polyethylene glycol 3350 17 Gram(s) Oral at bedtime  risperiDONE   Tablet 1.5 milliGRAM(s) Oral at bedtime  senna 2 Tablet(s) Oral at bedtime    MEDICATIONS  (PRN):  ALBUTerol    90 MICROgram(s) HFA Inhaler 2 Puff(s) Inhalation every 6 hours PRN Bronchospasm  HYDROmorphone   Tablet 2 milliGRAM(s) Oral every 3 hours PRN Moderate Pain (4 - 6)  HYDROmorphone   Tablet 4 milliGRAM(s) Oral every 3 hours PRN Severe Pain (7 - 10)  HYDROmorphone  Injectable 0.5 milliGRAM(s) IV Push every 3 hours PRN breakthrough pain  magnesium hydroxide Suspension 30 milliLiter(s) Oral daily PRN Constipation  ondansetron Injectable 4 milliGRAM(s) IV Push every 6 hours PRN Nausea and/or Vomiting      Allergies    No Known Allergies    Intolerances    SOCIAL HISTORY:  Smoker:  NO        PACK YEARS:                         WHEN QUIT?  ETOH use:  NO               FREQUENCY / QUANTITY:  Ilicit Drug use:  NO    FAMILY HISTORY:  FH: HTN (hypertension) (Mother)    Vital Signs Last 24 Hrs  T(C): 36.6 (02 Jul 2021 12:15), Max: 36.6 (02 Jul 2021 12:15)  T(F): 97.8 (02 Jul 2021 12:15), Max: 97.8 (02 Jul 2021 12:15)  HR: 69 (02 Jul 2021 12:15) (59 - 70)  BP: 113/67 (02 Jul 2021 12:15) (105/58 - 150/66)  BP(mean): --  RR: 16 (02 Jul 2021 12:15) (9 - 19)  SpO2: 98% (02 Jul 2021 12:15) (97% - 100%)

## 2021-07-02 NOTE — DISCHARGE NOTE PROVIDER - NSDCCPTREATMENT_GEN_ALL_CORE_FT
PRINCIPAL PROCEDURE  Procedure: Right total knee arthroplasty  Findings and Treatment: osteoarthritis right knee

## 2021-07-02 NOTE — CONSULT NOTE ADULT - ASSESSMENT
Aftercare following surgery    pain meds dilaudid. Monitor for respiratory depression and lethargy.  PT/OT.  DVT prophylaxis.  DVT ppx: [ ]ASA81 [ ] DDO378 [ ] Lovenox [ ] Coumadin   [ x] Eliquis [  ] Heparin  Dispo:     Home [ ]     Acute Rehab [ ]     [facility] [ ]     TBD [x ]      HTN/Dyslipidemia  continue procardia with paremeter.  simvastatin.      Obesity      Asthma  continue singular and bronchodilators Aftercare following surgery    pain meds dilaudid. Monitor for respiratory depression and lethargy.  PT/OT.  DVT prophylaxis.  DVT ppx: [ ]ASA81 [ ] AHM750 [ ] Lovenox [ ] Coumadin   [ x] Eliquis [  ] Heparin  Dispo:     Home [ x]     Acute Rehab [ ]     [facility] [ ]     TBD [ ]      HTN/Dyslipidemia  continue procardia with paremeter.  simvastatin.      Obesity      Asthma  continue singular and bronchodilators    Plan of care was discuss with patient, all questions were answered, seems understand and in agreement.

## 2021-07-02 NOTE — DISCHARGE NOTE PROVIDER - NSDCFUADDAPPT_GEN_ALL_CORE_FT
Call Dr Garcia's office for follow up in 2 weeks.    It is advisable to follow up w/ your pcp in 2-3 weeks to be sure there are no underlying problems.

## 2021-07-02 NOTE — DISCHARGE NOTE NURSING/CASE MANAGEMENT/SOCIAL WORK - NSSCNAMETXT_GEN_ALL_CORE
[facility] - [phone]   to visit the day after hospital discharge; physical therapist to follow. Please contact the home care agency at the above phone number if you have not heard from them by 12 noon on the day after your hospital discharge.

## 2021-07-02 NOTE — DISCHARGE NOTE PROVIDER - INSTRUCTIONS
regular  For Constipation :   • Increase your water intake. Drink at least 8 glasses of water daily.  • Try adding fiber to your diet by eating fruits, vegetables and foods that are rich in grains.  • If you do experience constipation, you may take an over-the-counter stool softener/laxative such as Azalea Colace, Senekot, miralax or  Milk of Magnesia.   will see patient in ED

## 2021-07-02 NOTE — DISCHARGE NOTE NURSING/CASE MANAGEMENT/SOCIAL WORK - NSDCPEELIQUISDIET_GEN_ALL_CORE
0
Eat healthy foods you enjoy. Apixaban/Eliquis DOES NOT have a special diet. Limit your alcohol intake.

## 2021-07-02 NOTE — OCCUPATIONAL THERAPY INITIAL EVALUATION ADULT - ADDITIONAL COMMENTS
Lives alone. has 15-16 steps to enter with handrail. No steps inside. Tub with doors.  Owns a rw and commode

## 2021-07-02 NOTE — DISCHARGE NOTE PROVIDER - HOSPITAL COURSE
The patient is a 75 y.o. female with severe osteoarthritis of the right knee. She was evaluated by the PST dept at [facility] and obtained the appropriate medical clearances.  After admission on 7/2/21 and receiving pre-operative parenteral prophylactic antibiotics (ancef), the patient  underwent an  uncomplicated right TKA by orthopedic surgeon Dr. Chandana Garcia.    A medical consultation from the Hospitalist service was obtained for post-operative medical co-management. Typical Physical & occupational therapy modalities post TKA were performed including ambulation training, range of motion, ADL's, and transfers. Eliquis 2.5 mge very 12 hrs, along w/ bilat venodynes was given for DVT prophylaxis.  The patient had a clean appearing surgical incision with no sign of surgical site infections and had a stable neuro / vascular exam of the operated extremity.  After progression of mobility guided by the PT/ OT staff,  the patient was felt to benefit from further rehabilitative care for restoration to level of function. This was felt to best be accomplished in a ________________.  Discharge and Orthopedic Care instructions were delineated in the Discharge Plan and reviewed with the patient. All medications were delineated in the medication reconciliation tool and key points were reviewed with the patient. They were deemed stable from an Orthopedic & medical standpoint for discharge today.  Upon ( discharge from the rehab facility ) or (completion of Home care ) they will be  following up with Dr. Garcia for office  follow up Orthopedic care.   The patient is a 75 y.o. female with severe osteoarthritis of the right knee. She was evaluated by the PST dept at [facility] and obtained the appropriate medical clearances.  After admission on 7/2/21 and receiving pre-operative parenteral prophylactic antibiotics (ancef), the patient  underwent an  uncomplicated right TKA by orthopedic surgeon Dr. Chandana Garcia.    A medical consultation from the Hospitalist service was obtained for post-operative medical co-management. Typical Physical & occupational therapy modalities post TKA were performed including ambulation training, range of motion, ADL's, and transfers. Eliquis 2.5 mge very 12 hrs, along w/ bilat venodynes was given for DVT prophylaxis.  The patient had a clean appearing surgical incision with no sign of surgical site infections and had a stable neuro / vascular exam of the operated extremity.  After progression of mobility guided by the PT/ OT staff,  the patient was felt to benefit from further rehabilitative care for restoration to level of function. This was felt to best be accomplish at home.  Discharge and Orthopedic Care instructions were delineated in the Discharge Plan and reviewed with the patient. All medications were delineated in the medication reconciliation tool and key points were reviewed with the patient. They were deemed stable from an Orthopedic & medical standpoint for discharge today.  Upon ( discharge from the rehab facility ) or (completion of Home care ) they will be  following up with Dr. Garcia for office  follow up Orthopedic care.

## 2021-07-02 NOTE — PHYSICAL THERAPY INITIAL EVALUATION ADULT - RANGE OF MOTION EXAMINATION, REHAB EVAL
Right LE decreased ROM due to s/p surgery. Left LE WNL./bilateral upper extremity ROM was WNL (within normal limits)/Left LE ROM was WNL (within normal limits)/Right LE ROM was WFL (within functional limits)

## 2021-07-02 NOTE — PHYSICAL THERAPY INITIAL EVALUATION ADULT - ADDITIONAL COMMENTS
Pt lives alone with 15 LEONOR and a handrail. Pt has an aide that lives with her from 7AM- 3PM. Pt has no steps inside.

## 2021-07-02 NOTE — DISCHARGE NOTE PROVIDER - CARE PROVIDER_API CALL
Chandana Garcia)  Orthopaedic Sports Medicine; Orthopaedic Surgery  [address]  [place] [address]  Phone: [phone]  Fax: [phone]  Established Patient  Follow Up Time:

## 2021-07-03 VITALS
SYSTOLIC BLOOD PRESSURE: 143 MMHG | TEMPERATURE: 97 F | HEART RATE: 62 BPM | RESPIRATION RATE: 18 BRPM | OXYGEN SATURATION: 98 % | DIASTOLIC BLOOD PRESSURE: 76 MMHG

## 2021-07-03 LAB
ANION GAP SERPL CALC-SCNC: 9 MMOL/L — SIGNIFICANT CHANGE UP (ref 5–17)
BUN SERPL-MCNC: 14 MG/DL — SIGNIFICANT CHANGE UP (ref 7–23)
CALCIUM SERPL-MCNC: 8.9 MG/DL — SIGNIFICANT CHANGE UP (ref 8.4–10.5)
CHLORIDE SERPL-SCNC: 104 MMOL/L — SIGNIFICANT CHANGE UP (ref 96–108)
CO2 SERPL-SCNC: 25 MMOL/L — SIGNIFICANT CHANGE UP (ref 22–31)
COVID-19 SPIKE DOMAIN AB INTERP: POSITIVE
COVID-19 SPIKE DOMAIN ANTIBODY RESULT: >250 U/ML — HIGH
CREAT SERPL-MCNC: 0.76 MG/DL — SIGNIFICANT CHANGE UP (ref 0.5–1.3)
GLUCOSE SERPL-MCNC: 139 MG/DL — HIGH (ref 70–99)
HCT VFR BLD CALC: 29.6 % — LOW (ref 34.5–45)
HGB BLD-MCNC: 9.5 G/DL — LOW (ref 11.5–15.5)
MCHC RBC-ENTMCNC: 28 PG — SIGNIFICANT CHANGE UP (ref 27–34)
MCHC RBC-ENTMCNC: 32.1 GM/DL — SIGNIFICANT CHANGE UP (ref 32–36)
MCV RBC AUTO: 87.3 FL — SIGNIFICANT CHANGE UP (ref 80–100)
NRBC # BLD: 0 /100 WBCS — SIGNIFICANT CHANGE UP (ref 0–0)
PLATELET # BLD AUTO: 193 K/UL — SIGNIFICANT CHANGE UP (ref 150–400)
POTASSIUM SERPL-MCNC: 3.7 MMOL/L — SIGNIFICANT CHANGE UP (ref 3.5–5.3)
POTASSIUM SERPL-SCNC: 3.7 MMOL/L — SIGNIFICANT CHANGE UP (ref 3.5–5.3)
RBC # BLD: 3.39 M/UL — LOW (ref 3.8–5.2)
RBC # FLD: 13.5 % — SIGNIFICANT CHANGE UP (ref 10.3–14.5)
SARS-COV-2 IGG+IGM SERPL QL IA: >250 U/ML — HIGH
SARS-COV-2 IGG+IGM SERPL QL IA: POSITIVE
SODIUM SERPL-SCNC: 138 MMOL/L — SIGNIFICANT CHANGE UP (ref 135–145)
WBC # BLD: 10.39 K/UL — SIGNIFICANT CHANGE UP (ref 3.8–10.5)
WBC # FLD AUTO: 10.39 K/UL — SIGNIFICANT CHANGE UP (ref 3.8–10.5)

## 2021-07-03 PROCEDURE — 86769 SARS-COV-2 COVID-19 ANTIBODY: CPT

## 2021-07-03 PROCEDURE — 94664 DEMO&/EVAL PT USE INHALER: CPT

## 2021-07-03 PROCEDURE — 97116 GAIT TRAINING THERAPY: CPT

## 2021-07-03 PROCEDURE — 85027 COMPLETE CBC AUTOMATED: CPT

## 2021-07-03 PROCEDURE — C1889: CPT

## 2021-07-03 PROCEDURE — 86901 BLOOD TYPING SEROLOGIC RH(D): CPT

## 2021-07-03 PROCEDURE — C1713: CPT

## 2021-07-03 PROCEDURE — 80048 BASIC METABOLIC PNL TOTAL CA: CPT

## 2021-07-03 PROCEDURE — 99232 SBSQ HOSP IP/OBS MODERATE 35: CPT

## 2021-07-03 PROCEDURE — 97165 OT EVAL LOW COMPLEX 30 MIN: CPT

## 2021-07-03 PROCEDURE — 73562 X-RAY EXAM OF KNEE 3: CPT

## 2021-07-03 PROCEDURE — 97161 PT EVAL LOW COMPLEX 20 MIN: CPT

## 2021-07-03 PROCEDURE — C1776: CPT

## 2021-07-03 PROCEDURE — 86900 BLOOD TYPING SEROLOGIC ABO: CPT

## 2021-07-03 PROCEDURE — 27447 TOTAL KNEE ARTHROPLASTY: CPT

## 2021-07-03 PROCEDURE — 36415 COLL VENOUS BLD VENIPUNCTURE: CPT

## 2021-07-03 PROCEDURE — 97535 SELF CARE MNGMENT TRAINING: CPT

## 2021-07-03 PROCEDURE — 88305 TISSUE EXAM BY PATHOLOGIST: CPT

## 2021-07-03 PROCEDURE — 88311 DECALCIFY TISSUE: CPT

## 2021-07-03 PROCEDURE — 86850 RBC ANTIBODY SCREEN: CPT

## 2021-07-03 PROCEDURE — 97530 THERAPEUTIC ACTIVITIES: CPT

## 2021-07-03 PROCEDURE — 94640 AIRWAY INHALATION TREATMENT: CPT

## 2021-07-03 RX ORDER — ASPIRIN/CALCIUM CARB/MAGNESIUM 324 MG
0 TABLET ORAL
Qty: 0 | Refills: 5 | DISCHARGE

## 2021-07-03 RX ORDER — APIXABAN 2.5 MG/1
1 TABLET, FILM COATED ORAL
Qty: 23 | Refills: 0
Start: 2021-07-03 | End: 2021-07-14

## 2021-07-03 RX ORDER — HYDROMORPHONE HYDROCHLORIDE 2 MG/ML
1 INJECTION INTRAMUSCULAR; INTRAVENOUS; SUBCUTANEOUS
Qty: 40 | Refills: 0
Start: 2021-07-03

## 2021-07-03 RX ORDER — HYDROMORPHONE HYDROCHLORIDE 2 MG/ML
2 INJECTION INTRAMUSCULAR; INTRAVENOUS; SUBCUTANEOUS ONCE
Refills: 0 | Status: DISCONTINUED | OUTPATIENT
Start: 2021-07-03 | End: 2021-07-03

## 2021-07-03 RX ADMIN — Medication 1000 MILLIGRAM(S): at 03:34

## 2021-07-03 RX ADMIN — PANTOPRAZOLE SODIUM 40 MILLIGRAM(S): 20 TABLET, DELAYED RELEASE ORAL at 06:02

## 2021-07-03 RX ADMIN — HYDROMORPHONE HYDROCHLORIDE 2 MILLIGRAM(S): 2 INJECTION INTRAMUSCULAR; INTRAVENOUS; SUBCUTANEOUS at 12:54

## 2021-07-03 RX ADMIN — HYDROMORPHONE HYDROCHLORIDE 4 MILLIGRAM(S): 2 INJECTION INTRAMUSCULAR; INTRAVENOUS; SUBCUTANEOUS at 03:00

## 2021-07-03 RX ADMIN — CELECOXIB 200 MILLIGRAM(S): 200 CAPSULE ORAL at 09:23

## 2021-07-03 RX ADMIN — HYDROMORPHONE HYDROCHLORIDE 4 MILLIGRAM(S): 2 INJECTION INTRAMUSCULAR; INTRAVENOUS; SUBCUTANEOUS at 16:07

## 2021-07-03 RX ADMIN — HYDROMORPHONE HYDROCHLORIDE 4 MILLIGRAM(S): 2 INJECTION INTRAMUSCULAR; INTRAVENOUS; SUBCUTANEOUS at 15:37

## 2021-07-03 RX ADMIN — Medication 400 MILLIGRAM(S): at 02:51

## 2021-07-03 RX ADMIN — Medication 1000 MILLIGRAM(S): at 18:52

## 2021-07-03 RX ADMIN — APIXABAN 2.5 MILLIGRAM(S): 2.5 TABLET, FILM COATED ORAL at 09:24

## 2021-07-03 RX ADMIN — Medication 5 MILLIGRAM(S): at 15:37

## 2021-07-03 RX ADMIN — CELECOXIB 200 MILLIGRAM(S): 200 CAPSULE ORAL at 09:25

## 2021-07-03 RX ADMIN — HYDROMORPHONE HYDROCHLORIDE 4 MILLIGRAM(S): 2 INJECTION INTRAMUSCULAR; INTRAVENOUS; SUBCUTANEOUS at 02:10

## 2021-07-03 RX ADMIN — HYDROMORPHONE HYDROCHLORIDE 4 MILLIGRAM(S): 2 INJECTION INTRAMUSCULAR; INTRAVENOUS; SUBCUTANEOUS at 10:30

## 2021-07-03 RX ADMIN — Medication 1000 MILLIGRAM(S): at 09:25

## 2021-07-03 RX ADMIN — Medication 5 MILLIGRAM(S): at 06:01

## 2021-07-03 RX ADMIN — HYDROMORPHONE HYDROCHLORIDE 4 MILLIGRAM(S): 2 INJECTION INTRAMUSCULAR; INTRAVENOUS; SUBCUTANEOUS at 09:56

## 2021-07-03 RX ADMIN — Medication 1000 MILLIGRAM(S): at 09:24

## 2021-07-03 NOTE — PROGRESS NOTE ADULT - SUBJECTIVE AND OBJECTIVE BOX
DAPHNE JORGENSEN                                                               [id number]                                                       Allergies---No Known Allergies        Pt is a 75y year old Female s/p right TKR.   Pt. is A&O x 3, resting comfortably, with no complaints.   Pain is 2/10.   Tolerating the diet.   Denies chest pain / shortness of breath / dyspnea / nausea / vomiting / headaches or light headed ness.         Vital Signs Last 24 Hrs  T(F): 98.1 (07-03-21 @ 08:00), Max: 98.4 (07-03-21 @ 03:18)  HR: 58 (07-03-21 @ 08:00)  BP: 101/65 (07-03-21 @ 08:00)  RR: 18 (07-03-21 @ 08:00)  SpO2: 98% (07-03-21 @ 08:00)    I&O's Detail    02 Jul 2021 07:01  -  03 Jul 2021 07:00  --------------------------------------------------------  IN:    Lactated Ringers: 1100 mL    Oral Fluid: 280 mL    Sodium Chloride 0.9% Bolus: 250 mL  Total IN: 1630 mL    OUT:    Estimated Blood Loss (mL): 100 mL    Voided (mL): 2700 mL  Total OUT: 2800 mL    Total NET: -1170 mL        PE:   Right Lower Extremity:   Ace bandage is C/D/I and removed.   GUILLE dressing is intact and fixated well.   No redness, swelling, heat, discharge or other evidence of infection, superficial or deep.   Neurovascularly intact.   No gross evidence of motor or sensory deficit.   +2 DP/PT pulses.   EHL/FHL/TA intact.   Toes are pink and mobile.   Capillary refill < 2 seconds.   Negative calf tenderness.   PAS on.                                9.5    10.39 )--------------( 193                          07-03-21 @ 06:49               29.6         138   |  104  |  14  -----------------------------<  139                  07-03-21 @ 06:49  3.7    |  25    |  0.76        Ca    8.9              A:   Pt is a 75y year old Female S/P right total knee replacement, Post Op Day #1        Plan:    - Follow up with Medicine    - OOB with PT/OT   - Pain control    - Incentive spirometry   - Labs in A.M.   - Discharge Planning   - DVT ppx = PAS +  apixaban 2.5 milliGRAM(s) Oral every 12 hours                                                                                                                                                                             Colin RITCHIE      
                                                                                 DAPHNE JORGENSEN      75y Female sitting comfortably in a chair, c/o right knee pain                                                                                                                              POD # 1       STATUS POST:               Pre-Op Dx: Primary osteoarthritis of right knee      Post-Op Dx:  Primary osteoarthritis of right knee      Procedure: Right total knee replacement                                                    T(F): 98.1  HR: 58  BP: 101/65  RR: 18  SpO2: 98%                        9.5    10.39 )-----------( 193      ( 03 Jul 2021 06:49 )             29.6                     07-03    138  |  104  |  14  ----------------------------<  139<H>  3.7   |  25  |  0.76    Ca    8.9      03 Jul 2021 06:49        Physical Exam :    -   Dressing C/D/I, GUILLE functioning properly.   -   Distal Neurvascular status intact grossly.   -   Warm well perfused; capillary refill <3 seconds   -   (+)EHL/FHL 5/5  -   (+) Sensation to light touch  -   (-) Calf tenderness Bilaterally    A/P: 75y Female s/p Right TKR      -   Ortho Stable  -   Pain control   -   Medicine to follow  -   DVT ppx:     [x ]SCDs     [ ] ASA     [x ] Eliquis     [ ] Lovenox  -   Weight bearing status:  WBAT [x ]        PWB    [ ]     TTWB  [ ]      NWB  [ ]   -  Dispo:     Home [x ]     Acute Rehab [ ]     [facility] [ ]     TBD [ ]
Ortho Post Op Check        Procedure: right TKA  Surgeon: Jose    Pt comfortable without complaints, pain controlled.  Has voided since surgery.  Denies CP, SOB, N/V, numbness/tingling   Pain Rx:  acetaminophen  IVPB .. 1000 milliGRAM(s) IV Intermittent once  acetaminophen  IVPB .. 1000 milliGRAM(s) IV Intermittent once  HYDROmorphone   Tablet 2 milliGRAM(s) Oral every 3 hours PRN  HYDROmorphone   Tablet 4 milliGRAM(s) Oral every 3 hours PRN  HYDROmorphone  Injectable 0.5 milliGRAM(s) IV Push every 3 hours PRN  mirtazapine 15 milliGRAM(s) Oral at bedtime  ondansetron Injectable 4 milliGRAM(s) IV Push every 6 hours PRN  risperiDONE   Tablet 1.5 milliGRAM(s) Oral at bedtime      General Exam:  Vital Signs Last 24 Hrs  T(C): 36.6 (07-02-21 @ 12:15), Max: 36.6 (07-02-21 @ 12:15)  T(F): 97.8 (07-02-21 @ 12:15), Max: 97.8 (07-02-21 @ 12:15)  HR: 69 (07-02-21 @ 12:15) (59 - 69)  BP: 113/67 (07-02-21 @ 12:15) (105/58 - 117/63)  BP(mean): --  RR: 16 (07-02-21 @ 12:15) (9 - 18)  SpO2: 98% (07-02-21 @ 12:15) (98% - 100%)    General: Pt Alert and oriented, NAD, controlled pain.  heart: RRR no murmur  lungs : clear  Abd: bs+ soft.    right knee ace dressing dry.  Abimael in place and fuctioning   Neuro/Vasc: Feet toes warm, pink. DP = 2+. No calf tenderness bilat.. Has sensation over feet & toes bilat. Full AROM bilat feet & toes. EHL = 5/5  VTEP: On Venodynes Bilat +  Eliquis 2.5 q 12 hr  Exercises reviewed and performed by pt.    A/P: 75yFemale POD#0 s/p  right TKA  - Stable  - Pain Control  - DVT ppx: eliquis  - Post op abx: ancef  - PT eval : recommending poss rehab. They will reassess later today  - Weight bearing status: wbat  d/c plan: home vs rehab
Patient is a 75y old  Female who presents with a chief complaint of right knee pain-- for right TKA (02 Jul 2021 13:45)      INTERVAL HPI/OVERNIGHT EVENTS:    no overnight events  passing gas, no BMS    MEDICATIONS  (STANDING):  acetaminophen   Tablet .. 1000 milliGRAM(s) Oral every 8 hours  apixaban 2.5 milliGRAM(s) Oral every 12 hours  atorvastatin 10 milliGRAM(s) Oral at bedtime  buPROPion SR (12-Hour) 150 milliGRAM(s) Oral daily  celecoxib 200 milliGRAM(s) Oral every 12 hours  lactated ringers. 1000 milliLiter(s) (125 mL/Hr) IV Continuous <Continuous>  mirtazapine 15 milliGRAM(s) Oral at bedtime  montelukast 10 milliGRAM(s) Oral at bedtime  NIFEdipine XL 90 milliGRAM(s) Oral daily  oxybutynin 5 milliGRAM(s) Oral every 8 hours  pantoprazole    Tablet 40 milliGRAM(s) Oral before breakfast  polyethylene glycol 3350 17 Gram(s) Oral at bedtime  risperiDONE   Tablet 1.5 milliGRAM(s) Oral at bedtime  senna 2 Tablet(s) Oral at bedtime    MEDICATIONS  (PRN):  ALBUTerol    90 MICROgram(s) HFA Inhaler 2 Puff(s) Inhalation every 6 hours PRN Bronchospasm  HYDROmorphone   Tablet 2 milliGRAM(s) Oral every 3 hours PRN Moderate Pain (4 - 6)  HYDROmorphone   Tablet 4 milliGRAM(s) Oral every 3 hours PRN Severe Pain (7 - 10)  HYDROmorphone  Injectable 0.5 milliGRAM(s) IV Push every 3 hours PRN breakthrough pain  magnesium hydroxide Suspension 30 milliLiter(s) Oral daily PRN Constipation  ondansetron Injectable 4 milliGRAM(s) IV Push every 6 hours PRN Nausea and/or Vomiting      Allergies    No Known Allergies    Intolerances      Vital Signs Last 24 Hrs  T(C): 36.7 (03 Jul 2021 08:00), Max: 36.9 (03 Jul 2021 03:18)  T(F): 98.1 (03 Jul 2021 08:00), Max: 98.4 (03 Jul 2021 03:18)  HR: 58 (03 Jul 2021 08:00) (58 - 69)  BP: 101/65 (03 Jul 2021 08:00) (95/53 - 117/63)  BP(mean): --  RR: 18 (03 Jul 2021 08:00) (9 - 18)  SpO2: 98% (03 Jul 2021 08:00) (95% - 100%)    PHYSICAL EXAM:  GENERAL: NAD  HEAD:  Atraumatic, Normocephalic  EYES: EOMI, PERRLA, conjunctiva and sclera clear  ENMT: Moist mucous membranes, No lesions; No tonsillar erythema, exudates, or enlargement  NECK: Supple, No JVD, Normal thyroid  NERVOUS SYSTEM:  Alert & Oriented X3, Good concentration; All 4 extremities mobile, no gross sensory deficits.   CHEST/LUNG: Clear to auscultation bilaterally; No rales, rhonchi, wheezing, or rubs  HEART: Regular rate and rhythm; No murmurs, rubs, or gallops  ABDOMEN: Soft, Nontender, Nondistended; Bowel sounds present  EXTREMITIES:  2+ Peripheral Pulses, No clubbing, cyanosis, or edema  LYMPH: No lymphadenopathy noted  SKIN: No rashes or lesions    LABS:                        9.5    10.39 )-----------( 193      ( 03 Jul 2021 06:49 )             29.6     03 Jul 2021 06:49    138    |  104    |  14     ----------------------------<  139    3.7     |  25     |  0.76     Ca    8.9        03 Jul 2021 06:49          CAPILLARY BLOOD GLUCOSE          RADIOLOGY & ADDITIONAL TESTS:    Imaging Personally Reviewed:  [ ] YES     Consultant(s) Notes Reviewed:      Care Discussed with Consultants/Other Providers:    Advanced Directives: [ ] DNR  [ ] No feeding tube  [ ] MOLST in chart  [ ] MOLST completed today  [ ] Unknown

## 2021-07-03 NOTE — PROGRESS NOTE ADULT - ASSESSMENT
Aftercare following surgery    pain meds dilaudid. Monitor for respiratory depression and lethargy.  PT/OT.  DVT prophylaxis.  DVT ppx: [ ]ASA81 [ ] GWV324 [ ] Lovenox [ ] Coumadin   [ x] Eliquis [  ] Heparin  Dispo:     Home [X]     Acute Rehab [ ]     [facility] [ ]     TBD [X ]    Anemia from blood loss from procedure, asymptomatic.     medically cleared to dc  when PT clears    HTN/Dyslipidemia  continue procardia with paremeter.  simvastatin.      Obesity      Asthma  continue singular and bronchodilators    Plan of care was discuss with patient, all questions were answered, seems understand and in agreement.

## 2021-07-03 NOTE — PHYSICAL THERAPY INITIAL EVALUATION ADULT - NS ASR WT BEARING DETAIL RLE
Escorted patient to results pending area at this time, instructed patient on split flow process and patient's plan of care moving forward. Patient verbalized understanding, advised to notify staff of any further needs, oriented patient to call bell in results waiting area.     Caitlin Small  07/03/21 2759     weight-bearing as tolerated

## 2021-07-07 RX ORDER — HYDROMORPHONE HYDROCHLORIDE 2 MG/1
2 TABLET ORAL
Qty: 21 | Refills: 0 | Status: ACTIVE | COMMUNITY
Start: 2021-07-07 | End: 1900-01-01

## 2021-07-13 ENCOUNTER — APPOINTMENT (OUTPATIENT)
Dept: ORTHOPEDIC SURGERY | Facility: CLINIC | Age: 76
End: 2021-07-13
Payer: MEDICARE

## 2021-07-13 DIAGNOSIS — M17.11 UNILATERAL PRIMARY OSTEOARTHRITIS, RIGHT KNEE: ICD-10-CM

## 2021-07-13 PROCEDURE — 99024 POSTOP FOLLOW-UP VISIT: CPT

## 2021-07-13 PROCEDURE — 73560 X-RAY EXAM OF KNEE 1 OR 2: CPT | Mod: RT

## 2021-07-13 RX ORDER — GABAPENTIN 100 MG/1
100 CAPSULE ORAL
Qty: 90 | Refills: 1 | Status: ACTIVE | COMMUNITY
Start: 2020-03-24 | End: 1900-01-01

## 2021-07-13 NOTE — HISTORY OF PRESENT ILLNESS
[Doing Well] : is doing well [Adequate Pain Control] : has adequate pain control [de-identified] : s/p  Right total knee arthroplasty on 07/02/2021  [de-identified] : 75 year female presents for a post operative evaluation s/p  Right total knee arthroplasty on 07/02/2021  Patient states she is feeling good post operatively. Patient presents in a ambulating with s/p  Right total knee arthroplasty on 07/02/2021. She  has been attending home physical therapy noting improvements in strength and range of motion. Patient notes some discharge from her incision over the weekend. She notes the discharge was thin, and translucent. She notes there has been no further discharge. \par Patient denies fever, chills, nausea or vomiting. Patient is here today for wound check, xrays, and clinical evaluation.  [de-identified] : Right Lower Extremity\par o Knee :\par ¦ Inspection/Palpation : diffuse tenderness to palpation, moderate diffuse swelling, no deformity, steri-strips in tact which were removed and replaced,  incisions clean, dry and intact, nonerythematous, no discharge or dehiscence. \par ¦ Range of Motion : 0 - 100 degrees, no crepitus\par ¦ Stability : no valgus or varus instability present on provocative testing, Lachman’s Test (-)\par ¦ Strength : hip flexion- not assessed today due to post operative status. \par o Muscle Bulk : normal muscle bulk present\par o Skin : no erythema, no ecchymosis\par o Sensation : sensation to pin intact\par o Vascular Exam : no edema, no cyanosis, dorsalis pedis artery pulse 2+, posterior tibial artery pulse 2+\par  [de-identified] : o Right Knee : AP, lateral, and skyline views of the knee were obtained, there are no soft tissue abnormalities, no fractures, alignment is normal, normal appearing joint spaces, normal bone density, no bony lesions s/p total knee arthroplasty in good position and proper alignment. No signs of loosening.  [de-identified] : Surgical procedure and post-operative xrays reviewed in great detail.\par \par Activity modifications and restrictions were discussed.\par \par Continue physical therapy as prescribed. A prescription for Physical Therapy was provided.\par \par Steri-strips were removed and new Steri-Strips were placed. She was instructed to allow the Steri-Strips to fall off on their own.\par \par Discussed pain medication at great length today. Discussed transition from Hydromprphine, to oxycodone and then tramadol. \par \par A prescription for oxycodone and tramadol was provided today  iSTOP was checked and recorded.\par \par A prescription was provided for Gabapentin 100mg tid. Discussed taking medication 1x a day at bedtime to start. She may titrate up to 300mg 3x a day as tolerated. \par  \par  FU 4 weeks. \par \par All questions were answered, all alternatives discussed and the patient is in complete agreement with that plan. Follow-up appointment as instructed. Any issues and the patient will call or come in sooner.

## 2021-07-15 RX ORDER — ONDANSETRON 4 MG/1
4 TABLET ORAL
Qty: 14 | Refills: 0 | Status: ACTIVE | COMMUNITY
Start: 2021-07-15 | End: 1900-01-01

## 2021-07-20 ENCOUNTER — APPOINTMENT (OUTPATIENT)
Dept: ORTHOPEDIC SURGERY | Facility: CLINIC | Age: 76
End: 2021-07-20

## 2021-07-22 RX ORDER — OXYCODONE AND ACETAMINOPHEN 5; 325 MG/1; MG/1
5-325 TABLET ORAL
Qty: 20 | Refills: 0 | Status: ACTIVE | COMMUNITY
Start: 2021-07-13 | End: 1900-01-01

## 2021-08-10 ENCOUNTER — APPOINTMENT (OUTPATIENT)
Dept: ORTHOPEDIC SURGERY | Facility: CLINIC | Age: 76
End: 2021-08-10

## 2021-08-31 RX ORDER — ACETAMINOPHEN 500 MG/1
500 TABLET, COATED ORAL
Qty: 180 | Refills: 0 | Status: ACTIVE | COMMUNITY
Start: 2020-05-01 | End: 1900-01-01

## 2021-09-10 ENCOUNTER — EMERGENCY (EMERGENCY)
Facility: HOSPITAL | Age: 76
LOS: 1 days | Discharge: HOME | End: 2021-09-10
Attending: EMERGENCY MEDICINE | Admitting: EMERGENCY MEDICINE
Payer: MEDICARE

## 2021-09-10 VITALS
DIASTOLIC BLOOD PRESSURE: 79 MMHG | OXYGEN SATURATION: 100 % | RESPIRATION RATE: 18 BRPM | SYSTOLIC BLOOD PRESSURE: 122 MMHG | HEART RATE: 85 BPM

## 2021-09-10 VITALS
HEIGHT: 66 IN | DIASTOLIC BLOOD PRESSURE: 72 MMHG | RESPIRATION RATE: 18 BRPM | HEART RATE: 86 BPM | SYSTOLIC BLOOD PRESSURE: 125 MMHG | OXYGEN SATURATION: 99 % | TEMPERATURE: 97 F

## 2021-09-10 DIAGNOSIS — Z98.51 TUBAL LIGATION STATUS: Chronic | ICD-10-CM

## 2021-09-10 DIAGNOSIS — Z90.710 ACQUIRED ABSENCE OF BOTH CERVIX AND UTERUS: Chronic | ICD-10-CM

## 2021-09-10 DIAGNOSIS — Z96.652 PRESENCE OF LEFT ARTIFICIAL KNEE JOINT: Chronic | ICD-10-CM

## 2021-09-10 DIAGNOSIS — I83.91 ASYMPTOMATIC VARICOSE VEINS OF RIGHT LOWER EXTREMITY: Chronic | ICD-10-CM

## 2021-09-10 LAB
ALBUMIN SERPL ELPH-MCNC: 5 G/DL — SIGNIFICANT CHANGE UP (ref 3.3–5)
ALP SERPL-CCNC: 61 U/L — SIGNIFICANT CHANGE UP (ref 40–120)
ALT FLD-CCNC: 20 U/L — SIGNIFICANT CHANGE UP (ref 4–33)
ANION GAP SERPL CALC-SCNC: 14 MMOL/L — SIGNIFICANT CHANGE UP (ref 7–14)
AST SERPL-CCNC: 22 U/L — SIGNIFICANT CHANGE UP (ref 4–32)
BASOPHILS # BLD AUTO: 0.02 K/UL — SIGNIFICANT CHANGE UP (ref 0–0.2)
BASOPHILS NFR BLD AUTO: 0.4 % — SIGNIFICANT CHANGE UP (ref 0–2)
BILIRUB SERPL-MCNC: 0.4 MG/DL — SIGNIFICANT CHANGE UP (ref 0.2–1.2)
BUN SERPL-MCNC: 20 MG/DL — SIGNIFICANT CHANGE UP (ref 7–23)
CALCIUM SERPL-MCNC: 9.7 MG/DL — SIGNIFICANT CHANGE UP (ref 8.4–10.5)
CHLORIDE SERPL-SCNC: 100 MMOL/L — SIGNIFICANT CHANGE UP (ref 98–107)
CK SERPL-CCNC: 205 U/L — HIGH (ref 25–170)
CO2 SERPL-SCNC: 24 MMOL/L — SIGNIFICANT CHANGE UP (ref 22–31)
CREAT SERPL-MCNC: 0.65 MG/DL — SIGNIFICANT CHANGE UP (ref 0.5–1.3)
EOSINOPHIL # BLD AUTO: 0.2 K/UL — SIGNIFICANT CHANGE UP (ref 0–0.5)
EOSINOPHIL NFR BLD AUTO: 3.5 % — SIGNIFICANT CHANGE UP (ref 0–6)
GLUCOSE SERPL-MCNC: 132 MG/DL — HIGH (ref 70–99)
HCT VFR BLD CALC: 36.6 % — SIGNIFICANT CHANGE UP (ref 34.5–45)
HGB BLD-MCNC: 11.8 G/DL — SIGNIFICANT CHANGE UP (ref 11.5–15.5)
IANC: 2 K/UL — SIGNIFICANT CHANGE UP (ref 1.5–8.5)
IMM GRANULOCYTES NFR BLD AUTO: 0.2 % — SIGNIFICANT CHANGE UP (ref 0–1.5)
LYMPHOCYTES # BLD AUTO: 3.04 K/UL — SIGNIFICANT CHANGE UP (ref 1–3.3)
LYMPHOCYTES # BLD AUTO: 53.2 % — HIGH (ref 13–44)
MAGNESIUM SERPL-MCNC: 2.3 MG/DL — SIGNIFICANT CHANGE UP (ref 1.6–2.6)
MCHC RBC-ENTMCNC: 27.7 PG — SIGNIFICANT CHANGE UP (ref 27–34)
MCHC RBC-ENTMCNC: 32.2 GM/DL — SIGNIFICANT CHANGE UP (ref 32–36)
MCV RBC AUTO: 85.9 FL — SIGNIFICANT CHANGE UP (ref 80–100)
MONOCYTES # BLD AUTO: 0.44 K/UL — SIGNIFICANT CHANGE UP (ref 0–0.9)
MONOCYTES NFR BLD AUTO: 7.7 % — SIGNIFICANT CHANGE UP (ref 2–14)
NEUTROPHILS # BLD AUTO: 2 K/UL — SIGNIFICANT CHANGE UP (ref 1.8–7.4)
NEUTROPHILS NFR BLD AUTO: 35 % — LOW (ref 43–77)
NRBC # BLD: 0 /100 WBCS — SIGNIFICANT CHANGE UP
NRBC # FLD: 0 K/UL — SIGNIFICANT CHANGE UP
PHOSPHATE SERPL-MCNC: 3.9 MG/DL — SIGNIFICANT CHANGE UP (ref 2.5–4.5)
PLATELET # BLD AUTO: 218 K/UL — SIGNIFICANT CHANGE UP (ref 150–400)
POTASSIUM SERPL-MCNC: 3.6 MMOL/L — SIGNIFICANT CHANGE UP (ref 3.5–5.3)
POTASSIUM SERPL-SCNC: 3.6 MMOL/L — SIGNIFICANT CHANGE UP (ref 3.5–5.3)
PROT SERPL-MCNC: 8 G/DL — SIGNIFICANT CHANGE UP (ref 6–8.3)
RBC # BLD: 4.26 M/UL — SIGNIFICANT CHANGE UP (ref 3.8–5.2)
RBC # FLD: 13.6 % — SIGNIFICANT CHANGE UP (ref 10.3–14.5)
SODIUM SERPL-SCNC: 138 MMOL/L — SIGNIFICANT CHANGE UP (ref 135–145)
TROPONIN T, HIGH SENSITIVITY RESULT: 11 NG/L — SIGNIFICANT CHANGE UP
WBC # BLD: 5.71 K/UL — SIGNIFICANT CHANGE UP (ref 3.8–10.5)
WBC # FLD AUTO: 5.71 K/UL — SIGNIFICANT CHANGE UP (ref 3.8–10.5)

## 2021-09-10 PROCEDURE — 99284 EMERGENCY DEPT VISIT MOD MDM: CPT | Mod: GC

## 2021-09-10 PROCEDURE — 71046 X-RAY EXAM CHEST 2 VIEWS: CPT | Mod: 26

## 2021-09-10 RX ORDER — LIDOCAINE 4 G/100G
1 CREAM TOPICAL ONCE
Refills: 0 | Status: COMPLETED | OUTPATIENT
Start: 2021-09-10 | End: 2021-09-10

## 2021-09-10 RX ORDER — CYCLOBENZAPRINE HYDROCHLORIDE 10 MG/1
1 TABLET, FILM COATED ORAL
Qty: 9 | Refills: 0
Start: 2021-09-10 | End: 2021-09-12

## 2021-09-10 RX ORDER — CYCLOBENZAPRINE HYDROCHLORIDE 10 MG/1
5 TABLET, FILM COATED ORAL ONCE
Refills: 0 | Status: COMPLETED | OUTPATIENT
Start: 2021-09-10 | End: 2021-09-10

## 2021-09-10 RX ORDER — ACETAMINOPHEN 500 MG
650 TABLET ORAL ONCE
Refills: 0 | Status: COMPLETED | OUTPATIENT
Start: 2021-09-10 | End: 2021-09-10

## 2021-09-10 RX ADMIN — CYCLOBENZAPRINE HYDROCHLORIDE 5 MILLIGRAM(S): 10 TABLET, FILM COATED ORAL at 03:28

## 2021-09-10 RX ADMIN — Medication 650 MILLIGRAM(S): at 03:28

## 2021-09-10 RX ADMIN — LIDOCAINE 1 PATCH: 4 CREAM TOPICAL at 03:28

## 2021-09-10 NOTE — ED ADULT TRIAGE NOTE - CHIEF COMPLAINT QUOTE
pt c/o having dizziness  headache, chest pain, bilateral arm pain for 3 days. pmh htn, asthma, depression ,arthritis

## 2021-09-10 NOTE — ED PROVIDER NOTE - ATTENDING CONTRIBUTION TO CARE
MD Dobbins:  I performed a face to face bedside interview with patient regarding history of present illness, review of symptoms and past medical history. I completed an independent physical exam(documented below).  I have discussed patient's plan of care with resident.   I agree with note as stated above, having amended the EMR as needed to reflect my findings. I have discussed the assessment and plan of care.  This includes during the time I functioned as the attending physician for this patient.  PE:  Gen: Alert, NAD  Head: NC, AT,  EOMI, normal lids/conjunctiva  ENT:  normal hearing, patent oropharynx without erythema/exudate  Neck: +supple, no meningismus/JVD, +Trachea midline  Chest: no chest wall tenderness, equal chest rise  Pulm: Bilateral BS, normal resp effort, no wheeze/stridor/retractions  CV: RRR, no M/R/G, +dist pulses  Abd: +BS, soft, NT/ND  Rectal: deferred  Mskel: no edema/erythema/cyanosis;  Tender over b/l shoulders and neck. Decreased strength throughout her body 2/2 to pain  Skin: no rash  Neuro: AAOx3, no sensory/motor deficits, CN 2-12 intact   MDM:  76yo F w/ pmh as above, p/w LUE pain which radiates to neck head, and around to RUE, associated w/ chest discomfort and nausea. Denies sob, f/c/cough/vomiting/recent trauma or falls. Pain is reproducible on exam. Ddx msk pain vs radiculopathy, less likely cardiac. Labs, imaging, meds, reassess.

## 2021-09-10 NOTE — ED PROVIDER NOTE - PATIENT PORTAL LINK FT
You can access the FollowMyHealth Patient Portal offered by [facility] by registering at the following website: [email or web address]. By joining [facility]’s FollowMyHealth portal, you will also be able to view your health information using other applications (apps) compatible with our system.

## 2021-09-10 NOTE — ED CLERICAL - NS ED CLERK NOTE PRE-ARRIVAL INFORMATION; PCP CONTACT INFORMATION
Group Topic: BH Group OT    Date: 3/27/2020  Start Time: 1040  End Time: 1140  Facilitators: JASWINDER Douglas    Focus: Task Skills  Number in attendance: 6    Method: Group  Attendance: not present for group     [phone]

## 2021-09-10 NOTE — ED CLERICAL - NS ED CLERK NOTE PRE-ARRIVAL INFORMATION; ADDITIONAL PRE-ARRIVAL INFORMATION
This patient is enrolled in the comprehensive joint replacement (CJR) program and has active care navigation.     -This patient can be followed up by the care navigation team within 24 hours. To arrange close follow-up or to obtain additional clinical information about this patient, please call the contact number above.     -Please call the hospitalist for consultation ([phone]) PRIOR to admission or observation.  The hospitalist is part of the care team and can assist in acute medical management.     -Please call the orthopedic resident ([phone]) for ALL patients who are admitted or placed in observation.

## 2021-09-10 NOTE — ED PROVIDER NOTE - CLINICAL SUMMARY MEDICAL DECISION MAKING FREE TEXT BOX
75F presenting with upper extremity pain and neck pain. Most likely secondary to MSK in origin vs. myositis vs. atypical chest pain. Will obtain labwork and will likely require admission as the patient is having difficulty getting around and may not be able to care for herself at home.

## 2021-09-10 NOTE — ED ADULT NURSE NOTE - NSICDXPASTSURGICALHX_GEN_ALL_CORE_FT
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

## 2021-09-10 NOTE — ED ADULT NURSE NOTE - OBJECTIVE STATEMENT
Pt presents to  26 AO4 ambulatory complaining of upper extremity pain. Pt endorsing worsening upper extremity pain for past 2 days, beginning in L hand, radiates around neck, into shoulders and down R arm. Also endorsing chest discomfort and nausea, denies any vomiting/diarrhea. Denies any other complaints. 20g IV placed to L AC labs drawn and sent and medicated as per EMR.

## 2021-09-10 NOTE — ED PROVIDER NOTE - NSFOLLOWUPINSTRUCTIONS_ED_ALL_ED_FT
You were seen in the hospital for upper extremity pain that radiated to your chest and neck. You should continue to use hot packs for your arms and neck to help with the pain and you may use the flexeril for the pain, but only when you have other aides or your son nearby to ensure you don't fall. Please return to the emergency department immediately if you develop worsening chest pain, shortness of breath or any other major changes that suggest you have a new medical issue.

## 2021-09-10 NOTE — ED PROVIDER NOTE - OBJECTIVE STATEMENT
74yo F with PMHx of OA, asthma, HLD p/w upper extremity pain. Patient endorses that for the past two days she has been having upper arm pain that starts in her left arm; radiates to her neck and head and then radiates to her right arm. She endorses some chest discomfort and nausea, but denies any vomiting. She also denies any infectious symptoms such as fever, cough, rashes or other changes. Denies any change to her urine and denies any new focal deficits such as weakness or numbness.

## 2021-09-10 NOTE — ED PROVIDER NOTE - PROGRESS NOTE DETAILS
Cl Koroma MD: Troponin levels low. Patient feels better with the medications and is walking with her cane well here. She has an aide at home to help care for her during the day and states that her son will pick her up. Will d/c at this time.

## 2021-09-14 ENCOUNTER — APPOINTMENT (OUTPATIENT)
Dept: ORTHOPEDIC SURGERY | Facility: CLINIC | Age: 76
End: 2021-09-14
Payer: MEDICARE

## 2021-09-14 DIAGNOSIS — M17.0 BILATERAL PRIMARY OSTEOARTHRITIS OF KNEE: ICD-10-CM

## 2021-09-14 PROCEDURE — 99024 POSTOP FOLLOW-UP VISIT: CPT

## 2021-09-16 PROBLEM — M17.0 PRIMARY LOCALIZED OSTEOARTHRITIS OF BOTH KNEES: Status: ACTIVE | Noted: 2019-05-09

## 2021-09-16 NOTE — HISTORY OF PRESENT ILLNESS
[Doing Well] : is doing well [Adequate Pain Control] : has adequate pain control [de-identified] : s/p  Right total knee arthroplasty on 07/02/2021  [de-identified] : 75 year female presents for a post operative evaluation s/p  Right total knee arthroplasty on 07/02/2021  Patient states she is feeling good post operatively. Patient presents in a ambulating with a cane. She is s/p  Right total knee arthroplasty on 07/02/2021. She has been attending home physical therapy noting improvements in strength and range of motion. She  has been attending physical therapy noting improvements in strength and range of motion Patient is taking Tramadol for pain relief with good relief in symptoms. \par Patient denies fever, chills, nausea or vomiting. Patient is here today for wound check, xrays, and clinical evaluation.  [de-identified] : Right Lower Extremity\par o Knee :\par ¦ Inspection/Palpation : diffuse tenderness to palpation, mild diffuse swelling, no deformity, inicision well healed. \par ¦ Range of Motion : 0 - 105 degrees, no crepitus, (LEFT 0-110) \par ¦ Stability : no valgus or varus instability present on provocative testing, Lachman’s Test (-)\par ¦ Strength : hip flexion 4-/5\par o Muscle Bulk : normal muscle bulk present\par o Skin : no erythema, no ecchymosis\par o Sensation : sensation to pin intact\par o Vascular Exam : no edema, no cyanosis, dorsalis pedis artery pulse 2+, posterior tibial artery pulse 2+\par  [de-identified] : Surgical procedure and post-operative xrays reviewed in great detail.\par \par Activity modifications and restrictions were discussed.\par \par Continue physical therapy as prescribed. A prescription for Physical Therapy was provided.\par \par Discussed pain medication at great length today. \par \par Continue taking Gabapentin and Tramadol for pain relief.  iSTOP was checked and recorded. \par \par  FU 6 weeks. \par \par All questions were answered, all alternatives discussed and the patient is in complete agreement with that plan. Follow-up appointment as instructed. Any issues and the patient will call or come in sooner.

## 2021-10-12 RX ORDER — TRAMADOL HYDROCHLORIDE 50 MG/1
50 TABLET, COATED ORAL TWICE DAILY
Qty: 60 | Refills: 2 | Status: ACTIVE | COMMUNITY
Start: 2020-01-23 | End: 1900-01-01

## 2021-12-14 ENCOUNTER — APPOINTMENT (OUTPATIENT)
Dept: ORTHOPEDIC SURGERY | Facility: CLINIC | Age: 76
End: 2021-12-14
Payer: MEDICARE

## 2021-12-14 VITALS — WEIGHT: 192 LBS | HEIGHT: 67 IN | BODY MASS INDEX: 30.13 KG/M2

## 2021-12-14 DIAGNOSIS — M17.0 BILATERAL PRIMARY OSTEOARTHRITIS OF KNEE: ICD-10-CM

## 2021-12-14 PROCEDURE — 99213 OFFICE O/P EST LOW 20 MIN: CPT

## 2021-12-17 PROBLEM — M17.0 PRIMARY OSTEOARTHRITIS OF KNEES, BILATERAL: Status: ACTIVE | Noted: 2019-06-28

## 2021-12-17 NOTE — DISCUSSION/SUMMARY
[de-identified] : Surgical procedure and post-operative xrays reviewed in great detail.\par \par Activity modifications and restrictions were discussed.\par \par Continue physical therapy as prescribed. A prescription for Physical Therapy was provided.\par \par Discussed pain medication at great length today. \par \par Continue taking Gabapentin and Tramadol for pain relief. Patient is not due to tramadol refill at this time. \par \par  FU 6 weeks. \par \par All questions were answered, all alternatives discussed and the patient is in complete agreement with that plan. Follow-up appointment as instructed. Any issues and the patient will call or come in sooner. \par

## 2021-12-17 NOTE — HISTORY OF PRESENT ILLNESS
[de-identified] : 75 year female presents for a post operative evaluation s/p Right total knee arthroplasty on 07/02/2021 Patient states she is feeling good post operatively. Patient presents in a ambulating with a cane. She is s/p Right total knee arthroplasty on 07/02/2021. She has been attending home physical therapy noting improvements in strength and range of motion. She has been attending physical therapy noting improvements in strength and range of motion Patient is taking Tramadol for pain relief with good relief in symptoms. \par

## 2021-12-17 NOTE — REASON FOR VISIT
[Follow-Up Visit] : a follow-up visit for [Aftercare Following Surgery] : aftercare following surgery [FreeTextEntry2] : s/p Right total knee arthroplasty on 07/02/2021.

## 2021-12-17 NOTE — PHYSICAL EXAM
[de-identified] : Right Lower Extremity\par o Knee :\par ¦ Inspection/Palpation : diffuse tenderness to palpation, mild diffuse swelling, no deformity, incision well healed. \par ¦ Range of Motion : 0 - 105 degrees, no crepitus, (LEFT 0-110) \par ¦ Stability : no valgus or varus instability present on provocative testing, Lachman’s Test (-)\par ¦ Strength : hip flexion 4+/5\par o Muscle Bulk : normal muscle bulk present\par o Skin : no erythema, no ecchymosis\par o Sensation : sensation to pin intact\par o Vascular Exam : no edema, no cyanosis, dorsalis pedis artery pulse 2+, posterior tibial artery pulse 2+\par .

## 2022-04-08 NOTE — OCCUPATIONAL THERAPY INITIAL EVALUATION ADULT - LEVEL OF INDEPENDENCE: DRESS LOWER BODY, OT EVAL
Group Topic: BH Check-in/Symptom Rating    Date: 4/8/2022  Start Time: 1030  End Time: 1050  Facilitators: DAVE Smith    Focus: Group check in   Number in attendance: 6    Method: Group  Attendance: Absent  Patient Evaluation: Invited - refused to attend       maximum assist (25% patients effort)

## 2022-06-28 ENCOUNTER — APPOINTMENT (OUTPATIENT)
Dept: ORTHOPEDIC SURGERY | Facility: CLINIC | Age: 77
End: 2022-06-28

## 2022-12-09 NOTE — ASU PREOP CHECKLIST - WEIGHT IN LBS
Pt received semi-supine in bed, +nasal cannula, +pulse oximeter, +IV, all lines intact, in NAD, Son at bedside. Pt agreeable to participate in PT evaluation 188

## 2023-05-16 NOTE — ED ADULT TRIAGE NOTE - NS ED TRIAGE EKG
Ft Chief complaint: Patient presents with:  Toenail: DFE      History of Present Illness: This 87 year old female is seen with two of her daughters for follow-up management of a LEFT fifth toe ulceration, diabetic foot exam and high risk nail debridement.    She received her most recent DM shoes from the orthotist, Shyanne Alvarado, in February/March, 2023. Her previous shoes had been creating too much moisture in her shoes and she was developing mold around her feet. Her new shoes are very comfortable and she has not had any moisture build up in the shoes.    She develops a LEFT lateral fifth toe callus that has ulcerated in the past, but she has not noticed a recent wound. She has not had pain from the toe. She used to wear a toe sleeve, but her caretakers were not removing it at night and her daughters said they would find it rolled up on her toe like a tourniquet.     She has a history of burning, tingling, and numbness in her feet. She has recently had a prickly, sleepy feeling in her feet.    No further pedal complaints today.         Lab Results   Component Value Date    A1C 7.1 04/13/2022    A1C 10.9 12/21/2021    A1C 6.8 07/09/2021    A1C 8.0 09/15/2020    A1C 6.8 04/02/2019    A1C 6.3 10/30/2018    A1C 6.1 10/24/2017       /77 (BP Location: Left arm, Patient Position: Sitting, Cuff Size: Adult Regular)   Pulse 110   Temp 99.2  F (37.3  C) (Tympanic)   SpO2 97%     Patient Active Problem List   Diagnosis     Advanced care planning/counseling discussion     Sciatica     Diverticulosis of large intestine     Osteoporosis     Obesity     Congenital cystic kidney disease     Myalgia and myositis     Calculus of kidney     Displacement of lumbar intervertebral disc without     ACP (advance care planning)     Controlled type 2 diabetes mellitus without complication, without long-term current use of insulin (H)     Permanent atrial fibrillation (H)     Lung nodule     Generalized muscle weakness     Chronic  systolic congestive heart failure (H)     Acute blood loss anemia     Chronic anticoagulation     Essential (primary) hypertension     Falls     Hematoma of arm, left, initial encounter     Traumatic hematoma of left hip     Chronic kidney disease, stage 3 (H)     Bilateral sensorineural hearing loss       Past Surgical History:   Procedure Laterality Date     Breast biopsy      LT, benign      SECTION       CHOLECYSTECTOMY      lap     COLONOSCOPY       LUCILLE / BSO         Current Outpatient Medications   Medication     acetaminophen (TYLENOL) 325 MG tablet     Alcohol Swabs (EASY TOUCH ALCOHOL PREP MEDIUM) 70 % PADS     artificial tears OINT ophthalmic ointment     aspirin (ASPIRIN LOW DOSE) 81 MG chewable tablet     Assure [place] Lancets MISC     atorvastatin (LIPITOR) 40 MG tablet     BD AUTOSHIELD DUO 30G X 5 MM     bismuth subsalicylate (PEPTO BISMOL) 262 MG/15ML suspension     blood glucose (NO BRAND SPECIFIED) test strip     Blood Glucose Monitoring Suppl (GLUCOCARD VITAL MONITOR) w/Device KIT     chlorthalidone (HYGROTON) 25 MG tablet     Continuous Blood Gluc  (FREESTYLE ALYSON 14 DAY READER) MARC     Continuous Blood Gluc  (FREESTYLE ALYSON 2 READER) MARC     Continuous Blood Gluc Sensor (FREESTYLE ALYSON 2 SENSOR) MISC     desonide (DESOWEN) 0.05 % external lotion     digoxin (LANOXIN) 125 MCG tablet     diphenhydrAMINE (BENADRYL) 25 MG tablet     DULoxetine (CYMBALTA) 20 MG capsule     ELIQUIS ANTICOAGULANT 5 MG tablet     fluticasone (FLONASE) 50 MCG/ACT nasal spray     glipiZIDE (GLUCOTROL XL) 2.5 MG 24 hr tablet     GLUCOCARD VITAL TEST test strip     insulin lispro (HUMALOG KWIKPEN) 100 UNIT/ML (1 unit dial) KWIKPEN     LANTUS SOLOSTAR 100 UNIT/ML soln     lisinopril (ZESTRIL) 5 MG tablet     metoprolol tartrate (LOPRESSOR) 100 MG tablet     mirtazapine (REMERON) 7.5 MG tablet     Multiple Vitamin (TAB-A-MARIUSZ) TABS     omeprazole (PRILOSEC) 20 MG DR capsule     polyethylene  glycol-propylene glycol (SYSTANE) 0.4-0.3 % SOLN ophthalmic solution     potassium chloride ER (KLOR-CON M) 20 MEQ CR tablet     SB BISMUTH 262 MG TABS     Specialty Vitamins Products (ICAPS LUTEIN & ZEAXANTHIN) TBEC     vitamin D3 (CHOLECALCIFEROL) 50 mcg (2000 units) tablet     No current facility-administered medications for this visit.          Allergies   Allergen Reactions     Ampicillin      Desvenlafaxine Other (See Comments)     Desvenlafaxine Succinate  Pristiq       Sertraline Hcl Other (See Comments) and Diarrhea     Zoloft       Sulfa Antibiotics Other (See Comments)     Sulfa(Sulfonamide Antibiotics)       Family History   Problem Relation Age of Onset     Myocardial Infarction Mother 59        myocardial infarction, cause of death     Other - See Comments Daughter         fibromyalgia     Thyroid Disease Sister         hypo     Thyroid Disease Sister         hypo     Thyroid Disease Sister         hypo     Myocardial Infarction Brother 63        myocardial infarction, cause of death       Social History     Socioeconomic History     Marital status:      Spouse name: None     Number of children: None     Years of education: None     Highest education level: None   Occupational History     None   Tobacco Use     Smoking status: Never Smoker     Smokeless tobacco: Never Used     Tobacco comment: no passive exposure   Vaping Use     Vaping Use: Never used   Substance and Sexual Activity     Alcohol use: No     Drug use: No     Sexual activity: Never   Other Topics Concern      Service Not Asked     Blood Transfusions Yes     Comment: 11/02/2012     Caffeine Concern Yes     Comment: coffee, 4 cups daily     Occupational Exposure Not Asked     Hobby Hazards Not Asked     Sleep Concern Not Asked     Stress Concern Not Asked     Weight Concern Not Asked     Special Diet Not Asked     Back Care Not Asked     Exercise Not Asked     Bike Helmet Not Asked     Seat Belt Yes     Self-Exams Not Asked      Parent/sibling w/ CABG, MI or angioplasty before 65F 55M? Yes   Social History Narrative     None     Social Determinants of Health     Financial Resource Strain: Not on file   Food Insecurity: Not on file   Transportation Needs: Not on file   Physical Activity: Not on file   Stress: Not on file   Social Connections: Not on file   Intimate Partner Violence: Not on file   Housing Stability: Not on file       ROS: 10 point ROS neg other than the symptoms noted above in the HPI.  EXAM  Constitutional: healthy, alert and no distress    Psychiatric: mentation appears normal and affect normal/bright    VASCULAR:  -Dorsalis pedis pulse +1/4   -Posterior tibial pulse +0/4 LEFT and +1/4 RIGHT    -Capillary refill time < 3 seconds to hallux  -Hair growth Absent to anterior legs and ankles  -Mild-to-moderate non-pitting edema to entire LEFT foot  NEURO:  -Positive for paresthesias to foot  -Epicritic and protective sensation diminished to the bilateral foot  DERM:  -Skin temperature within normal limits   -Skin diffusely dry and flaking to the foot  -Toenails thickened, dystrophic and discolored x 5 bilaterally   Wound Location:  LEFT lateral fifth toe  2023  Measurement:  0.1cm x 0.1cm x superficial through skin layer only  Drainage:  Minimal serous  Odor:  None  Underminin.1cm in all directions  Edges:  Intact  Base:  100% viable  Surrounding Skin: Intact  No severe erythema, no ascending erythema, no calor, no purulence, no malodor, no other signs of infection.   MSK:  -Moderate decrease in arch height while patient is NWB  -Muscle strength of ankles +5/5 for dorsiflexion, plantarflexion, ABDUction and ADDuction b/l    RIGHT FOOT RADIOGRAPH 2022  IMPRESSION: No plain film evidence of osteomyelitis at this time. Recommend follow-up.    AL HERNANDEZ MD   ============================================================    ASSESSMENT:    (E11.621,  L97.521) Diabetic ulcer of toe of left foot associated  with type 2 diabetes mellitus, limited to breakdown of skin (H)  (primary encounter diagnosis)    (L84) Callus of foot  (primary encounter diagnosis)    (L60.3) Onychodystrophy    (N18.31) Stage 3a chronic kidney disease (H)    (E11.9,  Z79.4) Diabetes mellitus type 2, insulin dependent (H)    (E11.42) Diabetic polyneuropathy associated with type 2 diabetes mellitus (H)      PLAN:  -Patient evaluated and examined. Treatment options discussed with no educational barriers noted.    -High risk toenail debridement x 10 toenails without incident     Diabetic foot ulcer:  -Callus removal on the LEFT lateral fifth toe -- small open wound through skin layer only  -Applied small amount of triple antibiotic ointment, gauze and a band aid.  -Patient's care facility to change this daily until healed.  --Patient was instructed to look for signs of infection (redness, swelling, pain, purulence, fever, chills, nausea, vomiting) and to return to podiatry or the emergency department immediately if there are any signs of infection.     -Once the wound is healed, the caretakers are to apply lotion to her feet twice daily, especially on the callus of the LEFT fifth toe (no lotion application between the toes). This will help keep the callus softer. She has her own Aveeno Lotion in her shoe.    -----------------------------------    -DM shoes: Patient was dispensed DM shoes in February / March, 2023 by the orthotist, Shyanne Alvarado. The shoes are very comfortable.    -Patient in agreement with the above treatment plan and all of patient's questions were answered.      Return to clinic 3 months for a diabetic foot exam and high risk nail debridement      Marian Geller DPM   EKG completed

## 2023-05-18 ENCOUNTER — EMERGENCY (EMERGENCY)
Facility: HOSPITAL | Age: 78
LOS: 1 days | Discharge: HOME | End: 2023-05-18
Attending: STUDENT IN AN ORGANIZED HEALTH CARE EDUCATION/TRAINING PROGRAM | Admitting: STUDENT IN AN ORGANIZED HEALTH CARE EDUCATION/TRAINING PROGRAM
Payer: MEDICARE

## 2023-05-18 VITALS
OXYGEN SATURATION: 100 % | TEMPERATURE: 98 F | DIASTOLIC BLOOD PRESSURE: 79 MMHG | SYSTOLIC BLOOD PRESSURE: 142 MMHG | RESPIRATION RATE: 18 BRPM | HEART RATE: 90 BPM

## 2023-05-18 DIAGNOSIS — Z98.51 TUBAL LIGATION STATUS: Chronic | ICD-10-CM

## 2023-05-18 DIAGNOSIS — I83.91 ASYMPTOMATIC VARICOSE VEINS OF RIGHT LOWER EXTREMITY: Chronic | ICD-10-CM

## 2023-05-18 DIAGNOSIS — Z90.710 ACQUIRED ABSENCE OF BOTH CERVIX AND UTERUS: Chronic | ICD-10-CM

## 2023-05-18 DIAGNOSIS — Z96.652 PRESENCE OF LEFT ARTIFICIAL KNEE JOINT: Chronic | ICD-10-CM

## 2023-05-18 LAB
ALBUMIN SERPL ELPH-MCNC: 4.6 G/DL — SIGNIFICANT CHANGE UP (ref 3.3–5)
ALP SERPL-CCNC: 47 U/L — SIGNIFICANT CHANGE UP (ref 40–120)
ALT FLD-CCNC: 21 U/L — SIGNIFICANT CHANGE UP (ref 4–33)
ANION GAP SERPL CALC-SCNC: 14 MMOL/L — SIGNIFICANT CHANGE UP (ref 7–14)
APTT BLD: 31.6 SEC — SIGNIFICANT CHANGE UP (ref 27–36.3)
AST SERPL-CCNC: 30 U/L — SIGNIFICANT CHANGE UP (ref 4–32)
BASOPHILS # BLD AUTO: 0.03 K/UL — SIGNIFICANT CHANGE UP (ref 0–0.2)
BASOPHILS NFR BLD AUTO: 0.6 % — SIGNIFICANT CHANGE UP (ref 0–2)
BILIRUB SERPL-MCNC: 0.4 MG/DL — SIGNIFICANT CHANGE UP (ref 0.2–1.2)
BUN SERPL-MCNC: 19 MG/DL — SIGNIFICANT CHANGE UP (ref 7–23)
CALCIUM SERPL-MCNC: 9.7 MG/DL — SIGNIFICANT CHANGE UP (ref 8.4–10.5)
CHLORIDE SERPL-SCNC: 101 MMOL/L — SIGNIFICANT CHANGE UP (ref 98–107)
CO2 SERPL-SCNC: 21 MMOL/L — LOW (ref 22–31)
CREAT SERPL-MCNC: 0.5 MG/DL — SIGNIFICANT CHANGE UP (ref 0.5–1.3)
D DIMER BLD IA.RAPID-MCNC: <150 NG/ML DDU — SIGNIFICANT CHANGE UP
EGFR: 95 ML/MIN/1.73M2 — SIGNIFICANT CHANGE UP
EOSINOPHIL # BLD AUTO: 0.19 K/UL — SIGNIFICANT CHANGE UP (ref 0–0.5)
EOSINOPHIL NFR BLD AUTO: 3.6 % — SIGNIFICANT CHANGE UP (ref 0–6)
GLUCOSE SERPL-MCNC: 113 MG/DL — HIGH (ref 70–99)
HCT VFR BLD CALC: 35.4 % — SIGNIFICANT CHANGE UP (ref 34.5–45)
HGB BLD-MCNC: 11.1 G/DL — LOW (ref 11.5–15.5)
IANC: 1.99 K/UL — SIGNIFICANT CHANGE UP (ref 1.8–7.4)
IMM GRANULOCYTES NFR BLD AUTO: 0.4 % — SIGNIFICANT CHANGE UP (ref 0–0.9)
INR BLD: 0.97 RATIO — SIGNIFICANT CHANGE UP (ref 0.88–1.16)
LYMPHOCYTES # BLD AUTO: 2.74 K/UL — SIGNIFICANT CHANGE UP (ref 1–3.3)
LYMPHOCYTES # BLD AUTO: 51.2 % — HIGH (ref 13–44)
MCHC RBC-ENTMCNC: 27.8 PG — SIGNIFICANT CHANGE UP (ref 27–34)
MCHC RBC-ENTMCNC: 31.4 GM/DL — LOW (ref 32–36)
MCV RBC AUTO: 88.5 FL — SIGNIFICANT CHANGE UP (ref 80–100)
MONOCYTES # BLD AUTO: 0.38 K/UL — SIGNIFICANT CHANGE UP (ref 0–0.9)
MONOCYTES NFR BLD AUTO: 7.1 % — SIGNIFICANT CHANGE UP (ref 2–14)
NEUTROPHILS # BLD AUTO: 1.99 K/UL — SIGNIFICANT CHANGE UP (ref 1.8–7.4)
NEUTROPHILS NFR BLD AUTO: 37.1 % — LOW (ref 43–77)
NRBC # BLD: 0 /100 WBCS — SIGNIFICANT CHANGE UP (ref 0–0)
NRBC # FLD: 0 K/UL — SIGNIFICANT CHANGE UP (ref 0–0)
NT-PROBNP SERPL-SCNC: 22 PG/ML — SIGNIFICANT CHANGE UP
PLATELET # BLD AUTO: 214 K/UL — SIGNIFICANT CHANGE UP (ref 150–400)
POTASSIUM SERPL-MCNC: 4.5 MMOL/L — SIGNIFICANT CHANGE UP (ref 3.5–5.3)
POTASSIUM SERPL-SCNC: 4.5 MMOL/L — SIGNIFICANT CHANGE UP (ref 3.5–5.3)
PROT SERPL-MCNC: 7.2 G/DL — SIGNIFICANT CHANGE UP (ref 6–8.3)
PROTHROM AB SERPL-ACNC: 11.3 SEC — SIGNIFICANT CHANGE UP (ref 10.5–13.4)
RBC # BLD: 4 M/UL — SIGNIFICANT CHANGE UP (ref 3.8–5.2)
RBC # FLD: 13.3 % — SIGNIFICANT CHANGE UP (ref 10.3–14.5)
SODIUM SERPL-SCNC: 136 MMOL/L — SIGNIFICANT CHANGE UP (ref 135–145)
TROPONIN T, HIGH SENSITIVITY RESULT: 8 NG/L — SIGNIFICANT CHANGE UP
TROPONIN T, HIGH SENSITIVITY RESULT: 9 NG/L — SIGNIFICANT CHANGE UP
WBC # BLD: 5.35 K/UL — SIGNIFICANT CHANGE UP (ref 3.8–10.5)
WBC # FLD AUTO: 5.35 K/UL — SIGNIFICANT CHANGE UP (ref 3.8–10.5)

## 2023-05-18 PROCEDURE — 93970 EXTREMITY STUDY: CPT | Mod: 26

## 2023-05-18 PROCEDURE — 93010 ELECTROCARDIOGRAM REPORT: CPT

## 2023-05-18 PROCEDURE — 99223 1ST HOSP IP/OBS HIGH 75: CPT | Mod: FS

## 2023-05-18 PROCEDURE — 71045 X-RAY EXAM CHEST 1 VIEW: CPT | Mod: 26

## 2023-05-18 NOTE — ED ADULT NURSE NOTE - OBJECTIVE STATEMENT
Received patient in room 16 c/o chest pain, SOB x 1 week, sent by PMD. PMHX Asthma, GERD, HTN. Patient is on cardiac monitor sinus rhythm w/O2 sat 100% on a room air. Patient is A&OX4, airway patent, breathing unlabored and even. Labs obtained, 20G IV placed on left arm, awaiting X-ray/US. Side rails up and safety maintained. Fall precaution in place. Call bells within reach.

## 2023-05-18 NOTE — ED ADULT NURSE NOTE - NSFALLHARMRISKINTERV_ED_ALL_ED

## 2023-05-18 NOTE — ED CDU PROVIDER INITIAL DAY NOTE - NS ED ATTENDING STATEMENT MOD
This was a shared visit with the AIDA. I reviewed and verified the documentation and independently performed the documented:

## 2023-05-18 NOTE — ED CDU PROVIDER INITIAL DAY NOTE - OBJECTIVE STATEMENT
77F h/o asthma, depression, HTN, HLD, GERD p/w sob, anterior chest pain and LE edema. States symptoms ongoing "the past few days" and started with chest pain that radiated to L arm. Later noted the other symptoms developing. States has never been able to lie flat due to GERD. Denies any change in exercise tolerance. No abd pain, n/v/d, sick contacts, cough, URI symptoms. Denies cardiac history and never with similar symptoms.    CDU JUSTINA Diaz Note----  ED Provider HPI as above, reviewed/agreed to.  Pt is a 76 yo female, PMH HTN, asthma, depression, GERD, presenting to the ED c/o chest pain as above.  In the ED, VSS, EKG with no acute findings.  Trop 9 ---> 8, d-dimer <150; CBC/coags/CMP grossly unremarkable.  Bilateral LE duplex US negative for DVT; CXR with no emergent findings (pending official radiology read).  Pt was sent to CDU for continued care plan:  Tele monitoring, stress test, echo, Tele Doc of Day evaluation, general observation care / monitoring.

## 2023-05-18 NOTE — ED PROVIDER NOTE - OBJECTIVE STATEMENT
77F h/o asthma, depression, HTN, HLD, GERD p/w sob, anterior chest pain and LE edema. States symptoms ongoing "the past few days" and started with chest pain that radiated to L arm. Later noted the other symptoms developing. States has never been able to lie flat due to GERD. Denies any change in exercise tolerance. No abd pain, n/v/d, sick contacts, cough, URI symptoms. Denies cardiac history and never with similar symptoms.

## 2023-05-18 NOTE — ED ADULT NURSE REASSESSMENT NOTE - NS ED NURSE REASSESS COMMENT FT1
Pt received lying in bed breathing room air in no acute distress.  A&OX4 and denies pain.  All safety maintained. ANTONIO Cantu RN

## 2023-05-18 NOTE — ED PROVIDER NOTE - CLINICAL SUMMARY MEDICAL DECISION MAKING FREE TEXT BOX
77F h/o asthma, depression, HTN, HLD, GERD p/w sob, anterior chest pain and LE edema - ddx includes new onset CHF vs ACS vs LE DVT vs less likely PE or PNA - will get cbc, cmp, trop, pro-bnp, d-dimer, LE dopplers, CXR

## 2023-05-18 NOTE — ED ADULT TRIAGE NOTE - CHIEF COMPLAINT QUOTE
c/o mid chest pain for 1 week, with SOB, reports was sent in by PMD for further eval. hx fo Asthma, GERD, HTN. breathing is even and unlabored, communicates with full and complete sentences.

## 2023-05-18 NOTE — ED CDU PROVIDER INITIAL DAY NOTE - ATTENDING APP SHARED VISIT CONTRIBUTION OF CARE
[x] [facility]) [facility] &  Therapy  [address]  [phone]  F: [phone] [] [address]  [facility]   [address]  P: [phone]  F: [phone] [] [address] &  Therapy  [address]  P: [phone]  F: [phone] [] [address]  P: [phone]  F: [phone] [] [address]  [facility]   [address]   [place]: [phone]  F: [phone]      Physical Therapy Daily Treatment Note    Date:  10/29/2021  Patient Name:  Sukumar Sotelo    :  1957  MRN: [id number]  Physician: Mayra Chan NP                                    Insurance: [employer], Medicaid (12 visits, from 21 to 21)  Medical Diagnosis: Spinal stenosis of lumbar region (M48.061)                  Rehab Codes: M54.5, M79.604, M79.605, M25.60, M62.81  Onset Date: 21                 Next 's appt. :    Visit# / total visits: ; Progress note for Medicare patient due at visit 10        Cancels/No Shows:     Subjective:    Pain:  [x] Yes  [] No Location: low back Pain Rating: (0-10 scale) 10/10  Pain altered Tx:  [] No  [x] Yes  Action: hot pack/estim  Comments: Pt states she she is coming from work and is having \"so much pain\" she was crying at work.           Objective:  Modalities:   Treatment Location  Left      Right                          Position    [x]          [x]  [x] Supine    [] Prone   [] Side lying  [] Sitting          Treatment Modality    Hot Pack:    [x] Large   [] Medium    [] Cervical     20 min    Electrical Stim:    [x] IFC     [] MFAC      [] HVPC                          Protocol:_______  _____X 20 min                          #Channels:  [x] 1        [x] 2       [] Other:    Other: Precautions:  Exercises:   Exercise Reps/ Time Weight/ Level Comments    sci fit  5' L1 Patient deferred 10/29           Standing       Incline stretch 3x20\"  Difficult    Heel raises 10x      Marches 10x      Hip abd 10x             Seated       LAQ 10x ea   x   Hamstring stretch 3x20\"   x   SB rollouts 10x3\"  Flexion - Added 10/29 x          Supine       LTR 10x10\"   x   SKTC 5xea 10\"  x   PPT 15x  Draw-ins only 10/5/21 x   PPT with march 15x 1 lb     PPT w/ SAME arm/leg lift 15x 1 lb ea Added 10.13    DKTC 10x10\"      glute sets 10x      SLR   Attempted 10/29 - too painful x   Bridge 10x   x   Hip add 15x Yellow ball  x   SAQ 10x 1#     Hip abd  10x Lime   x          Sidelying       Hip abduction 6x ea  Attempted 10/29 x          Other:      Treatment Charges: Mins Units   [x]  Modalities HP/estim 15 1   [x]  Ther Exercise 40 3   []  Manual Therapy     []  Ther Activities     []  Aquatics     []  Vasocompression     []  Other - There act     Total Treatment time 55 4       Assessment: [x] Progressing toward goals. Focus on mat program and proper technique and core activation this date with poor to fair tolerance. Patient with overall good recall for proper technique for majority of program. Verbal and tactile cueing to ensure proper core activation as well as for proper PPT technique with good carryover. Attempted to progress to sidelying hip abduction with patient verbalizing considerable increased pain/symptoms, unable to complete remaining reps. Continue with HP/estim at conclusion of treatment per patient preference as notes most benefit. [] No change. [x] Other: Patient requires frequent redirection due to excessive talking. Patient often completes more reps than suggested by therapist due to this.          [x] Patient would continue to benefit from skilled physical therapy services in order to:  address severely limited lumbar ROM, weakness of the core and LE's, abnormal posture (reduced lumbar lordosis), and functional impairments including difficulty with all ADL's. STG: (to be met in 8 treatments)  1. ? Pain: Decrease low back and LE pain to 7/10 - Not met, up to 8+/10  2. ? ROM: Increase lumbar flexion to 50% limited, extension and rotation bilaterally to 25% limited  3. ? Strength: Increase core strength as demonstrated by progression of DLS exercises - Progress, continued need for verbal and tactile cueing for proper activation  4. ? Function: Improve Oswestry to 65% impairment  5. Patient to be independent with home exercise program as demonstrated by performance with correct form without cues. - MET  6. Demonstrate Knowledge of fall prevention - MET     LTG: (to be met in 12 treatments)  1. Increase lumbar ROM to wfl with minimal pain  2. Pt will report improvement in sleep to being reduced by less than 1/4  3. Improve Oswestry to 50% impairment     Patient goals: Hoping for relief of pain    Pt. Education:  [x] Yes  [] No  [x] Reviewed Prior HEP/Ed  Method of Education: [x] Verbal  [x] Demo  [] Written  Comprehension of Education:   [x] Verbalizes understanding. [x] Demonstrates understanding. [x] Needs review. [x] Demonstrates/verbalizes HEP/Ed previously given. Plan: [x] Continue current frequency toward long and short term goals.     [x] Specific Instructions for subsequent treatments: Continue tx per POC, trial of HP during supine exercises, Finish STG        Time In: 5:25 pm        Time Out: 625 pm    Electronically signed by:  Greg Adan PTA 77F h/o asthma, depression, HTN, HLD, GERD p/w sob, anterior chest pain and LE edema. In the ED, VSS, EKG with RBBB, stable from previous EKG in [place]. Trop 9 ---> 8, d-dimer <150; pro-BNP wnl; CBC/coags/CMP grossly unremarkable.  Bilateral LE duplex US negative for DVT; CXR with no emergent findings.  Pt dispo'ed to CDU for Tele monitoring, stress test, echo, Tele Doc of Day evaluation, general observation care / monitoring.

## 2023-05-19 VITALS
RESPIRATION RATE: 17 BRPM | TEMPERATURE: 98 F | SYSTOLIC BLOOD PRESSURE: 147 MMHG | HEART RATE: 92 BPM | DIASTOLIC BLOOD PRESSURE: 84 MMHG | OXYGEN SATURATION: 99 %

## 2023-05-19 LAB
ALBUMIN SERPL ELPH-MCNC: 4.7 G/DL — SIGNIFICANT CHANGE UP (ref 3.3–5)
ALP SERPL-CCNC: 46 U/L — SIGNIFICANT CHANGE UP (ref 40–120)
ALT FLD-CCNC: 17 U/L — SIGNIFICANT CHANGE UP (ref 4–33)
ANION GAP SERPL CALC-SCNC: 14 MMOL/L — SIGNIFICANT CHANGE UP (ref 7–14)
AST SERPL-CCNC: 21 U/L — SIGNIFICANT CHANGE UP (ref 4–32)
BILIRUB SERPL-MCNC: 0.5 MG/DL — SIGNIFICANT CHANGE UP (ref 0.2–1.2)
BUN SERPL-MCNC: 14 MG/DL — SIGNIFICANT CHANGE UP (ref 7–23)
CALCIUM SERPL-MCNC: 9.6 MG/DL — SIGNIFICANT CHANGE UP (ref 8.4–10.5)
CHLORIDE SERPL-SCNC: 105 MMOL/L — SIGNIFICANT CHANGE UP (ref 98–107)
CHOLEST SERPL-MCNC: 200 MG/DL — HIGH
CO2 SERPL-SCNC: 25 MMOL/L — SIGNIFICANT CHANGE UP (ref 22–31)
CREAT SERPL-MCNC: 0.5 MG/DL — SIGNIFICANT CHANGE UP (ref 0.5–1.3)
EGFR: 95 ML/MIN/1.73M2 — SIGNIFICANT CHANGE UP
GLUCOSE SERPL-MCNC: 107 MG/DL — HIGH (ref 70–99)
HDLC SERPL-MCNC: 30 MG/DL — LOW
LIPID PNL WITH DIRECT LDL SERPL: 120 MG/DL — HIGH
MAGNESIUM SERPL-MCNC: 2.3 MG/DL — SIGNIFICANT CHANGE UP (ref 1.6–2.6)
NON HDL CHOLESTEROL: 170 MG/DL — HIGH
PHOSPHATE SERPL-MCNC: 3.7 MG/DL — SIGNIFICANT CHANGE UP (ref 2.5–4.5)
POTASSIUM SERPL-MCNC: 3.8 MMOL/L — SIGNIFICANT CHANGE UP (ref 3.5–5.3)
POTASSIUM SERPL-SCNC: 3.8 MMOL/L — SIGNIFICANT CHANGE UP (ref 3.5–5.3)
PROT SERPL-MCNC: 7.2 G/DL — SIGNIFICANT CHANGE UP (ref 6–8.3)
SODIUM SERPL-SCNC: 144 MMOL/L — SIGNIFICANT CHANGE UP (ref 135–145)
TRIGL SERPL-MCNC: 252 MG/DL — HIGH

## 2023-05-19 PROCEDURE — 93306 TTE W/DOPPLER COMPLETE: CPT | Mod: 26

## 2023-05-19 PROCEDURE — 73030 X-RAY EXAM OF SHOULDER: CPT | Mod: 26,LT

## 2023-05-19 PROCEDURE — 70450 CT HEAD/BRAIN W/O DYE: CPT | Mod: 26,MA

## 2023-05-19 PROCEDURE — 93016 CV STRESS TEST SUPVJ ONLY: CPT | Mod: GC

## 2023-05-19 PROCEDURE — 78452 HT MUSCLE IMAGE SPECT MULT: CPT | Mod: 26,ME

## 2023-05-19 PROCEDURE — 93018 CV STRESS TEST I&R ONLY: CPT | Mod: GC

## 2023-05-19 PROCEDURE — 99238 HOSP IP/OBS DSCHRG MGMT 30/<: CPT

## 2023-05-19 RX ORDER — METOCLOPRAMIDE HCL 10 MG
10 TABLET ORAL ONCE
Refills: 0 | Status: COMPLETED | OUTPATIENT
Start: 2023-05-19 | End: 2023-05-19

## 2023-05-19 RX ORDER — CYCLOBENZAPRINE HYDROCHLORIDE 10 MG/1
1 TABLET, FILM COATED ORAL
Qty: 7 | Refills: 0
Start: 2023-05-19 | End: 2023-05-25

## 2023-05-19 RX ORDER — ASPIRIN/CALCIUM CARB/MAGNESIUM 324 MG
81 TABLET ORAL DAILY
Refills: 0 | Status: DISCONTINUED | OUTPATIENT
Start: 2023-05-19 | End: 2023-05-22

## 2023-05-19 RX ORDER — LIDOCAINE 4 G/100G
1 CREAM TOPICAL
Qty: 7 | Refills: 0
Start: 2023-05-19 | End: 2023-05-25

## 2023-05-19 RX ORDER — LIDOCAINE 4 G/100G
1 CREAM TOPICAL ONCE
Refills: 0 | Status: COMPLETED | OUTPATIENT
Start: 2023-05-19 | End: 2023-05-19

## 2023-05-19 RX ADMIN — LIDOCAINE 1 PATCH: 4 CREAM TOPICAL at 17:34

## 2023-05-19 RX ADMIN — Medication 10 MILLIGRAM(S): at 11:38

## 2023-05-19 RX ADMIN — Medication 81 MILLIGRAM(S): at 11:32

## 2023-05-19 NOTE — ED CDU PROVIDER SUBSEQUENT DAY NOTE - HISTORY
78 yo female, PMH HTN, asthma, depression, GERD, presenting to the ED c/o intermittent anterior chest pain that radiates to LUE x past few days.  In the ED, VSS, EKG with no acute findings.  Trop 9 ---> 8, d-dimer <150; CBC/coags/CMP grossly unremarkable.  Bilateral LE duplex US negative for DVT; CXR with no emergent findings (pending official radiology read).  Pt was sent to CDU for continued care plan:  Tele monitoring, stress test, echo, Tele Doc of Day evaluation, general observation care / monitoring.  In the interim, pt objectively noted to be resting comfortably; pt has been clinically stable; no issues thus far.

## 2023-05-19 NOTE — ED CDU PROVIDER DISPOSITION NOTE - CARE PROVIDER_API CALL
Isai Villagomez (DO)  Cardiology  [address] [place] [address]  Phone: [phone]  Follow Up Time: 7-10 Days

## 2023-05-19 NOTE — ED CDU PROVIDER DISPOSITION NOTE - NSFOLLOWUPINSTRUCTIONS_ED_ALL_ED_FT
You were seen in our Emergency Department for chest pain.  continue your Home medications.  Follow up with your PMD in 48-72 hours.   Follow up with your Cardiologist in 48-72 hours (see referral list provided) for further outpatient cardiac workup.  Bring copies of all paperwork/results to your doctor.  If you develop worsening pain, shortness of breath, dizziness, palpitations, rectal bleeding, numbness, tingling, weakness or any worsening symptoms return to our ED for evaluation.

## 2023-05-19 NOTE — ED CDU PROVIDER DISPOSITION NOTE - CLINICAL COURSE
76 yo female, PMH HTN, asthma, depression, GERD, presenting to the ED c/o intermittent anterior chest pain that radiates to LUE x past few days.  In the ED, VSS, EKG with no acute findings.  Trop 9 ---> 8, d-dimer <150; CBC/coags/CMP grossly unremarkable.  Bilateral LE duplex US negative for DVT; CXR with no emergent findings (pending official radiology read).  Pt was sent to CDU for continued care plan:  Tele monitoring, stress test, echo, Tele Doc of Day evaluation, general observation care / monitoring. pt evaluated by teledoc, NST negative for inducible ischemia, ECHO revealing EF 67% minimal valvular disease. upon reassessment, pt chest pain free, VSS, will f/u outpatient w. PCP / cardio. Return precautions discussed. pt verbalized understanding and is in agreement with plan. 78 yo female, PMH HTN, asthma, depression, GERD, presenting to the ED c/o intermittent anterior chest pain that radiates to LUE x past few days.  In the ED, VSS, EKG with no acute findings.  Trop 9 ---> 8, d-dimer <150; CBC/coags/CMP grossly unremarkable.  Bilateral LE duplex US negative for DVT; CXR with no emergent findings. Pt was sent to CDU for continued care plan:  Tele monitoring, stress test, echo, Tele Doc of Day evaluation, general observation care / monitoring. Pt also complained of l shoulder pain, FUll ROM, xrays negative. CT brain negative as well, headache resolved. pt evaluated by teledoc, NST negative for inducible ischemia, ECHO revealing EF 67% minimal valvular disease. upon reassessment, pt chest pain free, VSS, will f/u outpatient w. PCP / cardio. Return precautions discussed. pt verbalized understanding and is in agreement with plan. ortho referral given for L shoulder pain. recommending NSAIDs for pain. 76 yo female, PMH HTN, asthma, depression, GERD, presenting to the ED c/o intermittent anterior chest pain that radiates to LUE x past few days.  In the ED, VSS, EKG with no acute findings.  Trop 9 ---> 8, d-dimer <150; CBC/coags/CMP grossly unremarkable.  Bilateral LE duplex US negative for DVT; CXR with no emergent findings. Pt was sent to CDU for continued care plan:  Tele monitoring, stress test, echo, Tele Doc of Day evaluation, general observation care / monitoring. Pt also complained of l shoulder pain, FUll ROM, xrays negative. CT brain negative as well, headache resolved. pt evaluated by teledoc, NST negative for inducible ischemia, ECHO revealing EF 67% minimal valvular disease. upon reassessment, pt chest pain free, VSS, will f/u outpatient w. PCP / cardio. Return precautions discussed. pt verbalized understanding and is in agreement with plan. ortho referral given for L shoulder pain. recommending NSAIDs for pain. lidoderm patch applied on dc, flexeril sent to pharmacy as needed for pain, nightly 5mg. pt will not drink etoh/drive when taking. pain is located to L Scapular region, no bony ttp. recommendign warm compresses as well. will dc. son here for .

## 2023-05-19 NOTE — ED CDU PROVIDER DISPOSITION NOTE - PATIENT PORTAL LINK FT
You can access the FollowMyHealth Patient Portal offered by [facility] by registering at the following website: [email or web address]. By joining [facility]’s FollowMyHealth portal, you will also be able to view your health information using other applications (apps) compatible with our system.

## 2023-05-19 NOTE — CONSULT NOTE ADULT - SUBJECTIVE AND OBJECTIVE BOX
Cardiovascular Disease Initial Evaluation  Date of service: 05-19-23 @ 11:24    CHIEF COMPLAINT: Chest pain     HISTORY OF PRESENT ILLNESS:  77F h/o asthma, depression, HTN, HLD, GERD p/w sob, anterior chest pain and LE edema. States symptoms ongoing "the past few days" and started with chest pain that radiated to L arm. Later noted the other symptoms developing. States has never been able to lie flat due to GERD. Denies any change in exercise tolerance. No abd pain, n/v/d, sick contacts, cough, URI symptoms. Denies cardiac history and never with similar symptoms.      Allergies    No Known Allergies    Intolerances    	    MEDICATIONS:  aspirin enteric coated 81 milliGRAM(s) Oral daily        metoclopramide Injectable 10 milliGRAM(s) IV Push once            PAST MEDICAL & SURGICAL HISTORY:  Essential hypertension      Asthma  controlled      Hyperlipidemia      Vertigo      H/O gastroesophageal reflux (GERD)      Depression      Obesity      COVID-19 vaccine series completed      H/O insomnia      H/O edema  BLE      Overactive bladder      H/O tubal ligation      S/P total knee replacement, left      S/P hysterectomy      H/O tubal ligation      Varicose veins of right lower extremity  s/p surgery          FAMILY HISTORY:  FH: HTN (hypertension) (Mother)        SOCIAL HISTORY:    The patient is a nonsmoker       REVIEW OF SYSTEMS:  See HPI, otherwise complete 14 point review of systems negative    [x ] All others negative	  [ ] Unable to obtain    PHYSICAL EXAM:  T(C): 36.3 (05-19-23 @ 09:34), Max: 36.8 (05-19-23 @ 01:11)  HR: 87 (05-19-23 @ 09:34) (73 - 90)  BP: 144/88 (05-19-23 @ 09:34) (117/66 - 144/88)  RR: 16 (05-19-23 @ 09:34) (15 - 18)  SpO2: 99% (05-19-23 @ 09:34) (98% - 100%)  Wt(kg): --  I&O's Summary      Appearance: No Acute Distress; resting comfortably  HEENT:  Normal oral mucosa, PERRL, EOMI	  Cardiovascular: Normal S1 S2, No JVD, No murmurs/rubs/gallops  Respiratory: Normal respiratory effort; Lungs clear to auscultation bilaterally  Gastrointestinal:  Soft, Non-tender, + BS	  Skin: No rashes, No ecchymoses, No cyanosis	  Neurologic: Non-focal; no weakness  Extremities: No clubbing, cyanosis or edema  Vascular: Peripheral pulses palpable 2+ bilaterally  Psychiatry: A & O x 3, Mood & affect appropriate    Laboratory Data:	 	    CBC Full  -  ( 18 May 2023 18:19 )  WBC Count : 5.35 K/uL  Hemoglobin : 11.1 g/dL  Hematocrit : 35.4 %  Platelet Count - Automated : 214 K/uL  Mean Cell Volume : 88.5 fL  Mean Cell Hemoglobin : 27.8 pg  Mean Cell Hemoglobin Concentration : 31.4 gm/dL  Auto Neutrophil # : 1.99 K/uL  Auto Lymphocyte # : 2.74 K/uL  Auto Monocyte # : 0.38 K/uL  Auto Eosinophil # : 0.19 K/uL  Auto Basophil # : 0.03 K/uL  Auto Neutrophil % : 37.1 %  Auto Lymphocyte % : 51.2 %  Auto Monocyte % : 7.1 %  Auto Eosinophil % : 3.6 %  Auto Basophil % : 0.6 %    05-19    144  |  105  |  14  ----------------------------<  107<H>  3.8   |  25  |  0.50  05-18    136  |  101  |  19  ----------------------------<  113<H>  4.5   |  21<L>  |  0.50    Ca    9.6      19 May 2023 06:13  Ca    9.7      18 May 2023 18:19  Phos  3.7     05-19  Mg     2.30     05-19    TPro  7.2  /  Alb  4.7  /  TBili  0.5  /  DBili  x   /  AST  21  /  ALT  17  /  AlkPhos  46  05-19  TPro  7.2  /  Alb  4.6  /  TBili  0.4  /  DBili  x   /  AST  30  /  ALT  21  /  AlkPhos  47  05-18      proBNP:   Lipid Profile:   HgA1c:   TSH:       CARDIAC MARKERS: Trop T 9-8            Interpretation of Telemetry: Sinus	    ECG: Sinus with RBBB  RADIOLOGY:  OTHER: 	    PREVIOUS DIAGNOSTIC TESTING:    [ x] Echocardiogram: 5/19/2023  1. Mitral annular calcification, otherwise normal mitral  valve. Minimal mitral regurgitation.  2. Normal left ventricular internal dimensions and wall  thicknesses.  3. Normal left ventricular systolic function. No segmental  wall motion abnormalities.  4. Normal right ventricular size and function.  [ ] Catheterization:  [ ] Stress Test:  	    Assessment:  77F h/o asthma, depression, HTN, HLD, GERD p/w sob, anterior chest pain and LE edema    Plan of Care:    #Chest pain  - ACS ruled out  - EKG shows sinus rhythm with RBBB and no acute ischemic changes  - TTE 5/19/2023 shows normal LV systolic function   - Nuclear stress test pending  - If Stress test is normal, patient may be discharged from a cardiac standpoint with outpatient follow up    #LE edema  - LE Doppler negative for DVT    #HTN  - BP acceptable  - Please confirm home medication list    #Asthma  - Pt with SOB  - Defer to primary team    #ACP (advance care planning)-  Advanced care planning was discussed with the patient.  Risks, benefits and alternatives of medical treatment and procedures were discussed in detail and all questions were answered. 30 additional minutes spent addressing advance care plans.        72 minutes spent on total encounter; more than 50% of the visit was spent counseling and/or coordinating care by the attending physician.   	  Isai Villagomez DO [facility]  Cardiovascular Diseases  [phone]     Cardiovascular Disease Initial Evaluation  Date of service: 05-19-23 @ 11:24    CHIEF COMPLAINT: Chest pain     HISTORY OF PRESENT ILLNESS:  77F h/o asthma, depression, HTN, HLD, GERD p/w sob, anterior chest pain and LE edema. States symptoms ongoing "the past few days" and started with chest pain that radiated to L arm. Later noted the other symptoms developing. States has never been able to lie flat due to GERD. Denies any change in exercise tolerance. No abd pain, n/v/d, sick contacts, cough, URI symptoms. Denies cardiac history and never with similar symptoms.      Allergies    No Known Allergies    Intolerances    	    MEDICATIONS:  aspirin enteric coated 81 milliGRAM(s) Oral daily        metoclopramide Injectable 10 milliGRAM(s) IV Push once            PAST MEDICAL & SURGICAL HISTORY:  Essential hypertension      Asthma  controlled      Hyperlipidemia      Vertigo      H/O gastroesophageal reflux (GERD)      Depression      Obesity      COVID-19 vaccine series completed      H/O insomnia      H/O edema  BLE      Overactive bladder      H/O tubal ligation      S/P total knee replacement, left      S/P hysterectomy      H/O tubal ligation      Varicose veins of right lower extremity  s/p surgery          FAMILY HISTORY:  FH: HTN (hypertension) (Mother)        SOCIAL HISTORY:    The patient is a nonsmoker       REVIEW OF SYSTEMS:  See HPI, otherwise complete 14 point review of systems negative    [x ] All others negative	  [ ] Unable to obtain    PHYSICAL EXAM:  T(C): 36.3 (05-19-23 @ 09:34), Max: 36.8 (05-19-23 @ 01:11)  HR: 87 (05-19-23 @ 09:34) (73 - 90)  BP: 144/88 (05-19-23 @ 09:34) (117/66 - 144/88)  RR: 16 (05-19-23 @ 09:34) (15 - 18)  SpO2: 99% (05-19-23 @ 09:34) (98% - 100%)  Wt(kg): --  I&O's Summary      Appearance: No Acute Distress; resting comfortably  HEENT:  Normal oral mucosa, PERRL, EOMI	  Cardiovascular: Normal S1 S2, No JVD, No murmurs/rubs/gallops  Respiratory: Normal respiratory effort; Lungs clear to auscultation bilaterally  Gastrointestinal:  Soft, Non-tender, + BS	  Skin: No rashes, No ecchymoses, No cyanosis	  Neurologic: Non-focal; no weakness  Extremities: No clubbing, cyanosis or edema  Vascular: Peripheral pulses palpable 2+ bilaterally  Psychiatry: A & O x 3, Mood & affect appropriate    Laboratory Data:	 	    CBC Full  -  ( 18 May 2023 18:19 )  WBC Count : 5.35 K/uL  Hemoglobin : 11.1 g/dL  Hematocrit : 35.4 %  Platelet Count - Automated : 214 K/uL  Mean Cell Volume : 88.5 fL  Mean Cell Hemoglobin : 27.8 pg  Mean Cell Hemoglobin Concentration : 31.4 gm/dL  Auto Neutrophil # : 1.99 K/uL  Auto Lymphocyte # : 2.74 K/uL  Auto Monocyte # : 0.38 K/uL  Auto Eosinophil # : 0.19 K/uL  Auto Basophil # : 0.03 K/uL  Auto Neutrophil % : 37.1 %  Auto Lymphocyte % : 51.2 %  Auto Monocyte % : 7.1 %  Auto Eosinophil % : 3.6 %  Auto Basophil % : 0.6 %    05-19    144  |  105  |  14  ----------------------------<  107<H>  3.8   |  25  |  0.50  05-18    136  |  101  |  19  ----------------------------<  113<H>  4.5   |  21<L>  |  0.50    Ca    9.6      19 May 2023 06:13  Ca    9.7      18 May 2023 18:19  Phos  3.7     05-19  Mg     2.30     05-19    TPro  7.2  /  Alb  4.7  /  TBili  0.5  /  DBili  x   /  AST  21  /  ALT  17  /  AlkPhos  46  05-19  TPro  7.2  /  Alb  4.6  /  TBili  0.4  /  DBili  x   /  AST  30  /  ALT  21  /  AlkPhos  47  05-18      proBNP:   Lipid Profile:   HgA1c:   TSH:       CARDIAC MARKERS: Trop T 9-8            Interpretation of Telemetry: Sinus	    ECG: Sinus with RBBB  RADIOLOGY:  OTHER: 	    PREVIOUS DIAGNOSTIC TESTING:    [ x] Echocardiogram: 5/19/2023  1. Mitral annular calcification, otherwise normal mitral  valve. Minimal mitral regurgitation.  2. Normal left ventricular internal dimensions and wall  thicknesses.  3. Normal left ventricular systolic function. No segmental  wall motion abnormalities.  4. Normal right ventricular size and function.  [ ] Catheterization:  [ ] Stress Test:  	    Assessment:  77F h/o asthma, depression, HTN, HLD, GERD p/w sob, anterior chest pain and LE edema    Plan of Care:    #Chest pain  - ACS ruled out  - EKG shows sinus rhythm with RBBB and no acute ischemic changes  - TTE 5/19/2023 shows normal LV systolic function   - Nuclear stress test pending  - If Stress test is normal, patient may be discharged from a cardiac standpoint with outpatient follow up    #LE edema  - LE Doppler negative for DVT    #HLD  - Recommend high dose statin therapy    #HTN  - BP acceptable  - Please confirm home medication list    #Asthma  - Pt with SOB  - Defer to primary team    #ACP (advance care planning)-  Advanced care planning was discussed with the patient.  Risks, benefits and alternatives of medical treatment and procedures were discussed in detail and all questions were answered. 30 additional minutes spent addressing advance care plans.        72 minutes spent on total encounter; more than 50% of the visit was spent counseling and/or coordinating care by the attending physician.   	  Isai Villagomez DO [facility]  Cardiovascular Diseases  [phone]

## 2023-05-19 NOTE — ED CDU PROVIDER SUBSEQUENT DAY NOTE - ATTENDING APP SHARED VISIT CONTRIBUTION OF CARE
I (Russell) agree with above, I performed a history and physical. Counseled jaymie medical staff, physician assistant, and/or medical student on medical decision making as documented. Medical decisions and treatment interventions were made in real time during the patient encounter. Additionally and/or with the following exceptions: the patient also has headache and shoulder pain for 1 year, will image, pending stress at time of writing, will reassess

## 2023-05-19 NOTE — ED CDU PROVIDER DISPOSITION NOTE - ATTENDING CONTRIBUTION TO CARE
I (Russell) agree with above, I performed a history and physical. Counseled jaymie medical staff, physician assistant, and/or medical student on medical decision making as documented. Medical decisions and treatment interventions were made in real time during the patient encounter. Additionally and/or with the following exceptions:

## 2023-06-12 NOTE — REVIEW OF SYSTEMS
Never [Arthralgia] : arthralgia [Joint Pain] : joint pain [Joint Stiffness] : joint stiffness [Joint Swelling] : joint swelling [Negative] : Heme/Lymph

## 2023-07-18 NOTE — H&P PST ADULT - DOES PATIENT MEET CRITERIA FOR SEPSIS
Group Therapy Documentation    PATIENT'S NAME: Lauren Montenegro  MRN:   [id number]  :   2007  ACCT. NUMBER: [id number]  DATE OF SERVICE: 23  START TIME:  8:30 AM  END TIME:  9:00 AM  FACILITATOR(S): Dimple Castano  TOPIC: BEH Group Therapy  Number of patients attending the group:  13  Group Length:  0.5 Hours    Dimensions addressed 3, 4, 5, and 6    Summary of Group / Topics Discussed:    Group Therapy/Process Group:  Community Group  Patient completed diary card ratings for the last 24 hours including emotions, safety concerns, substance use, treatment interfering behaviors, and use of DBT skills.  Patient checked in regarding the previous evening as well as progress on treatment goals.    Patient Session Goals / Objectives:  * Patient will increase awareness of emotions and ability to identify them  * Patient will report substance use and safety concerns   * Patient will increase use of DBT skills      Group Attendance:  Attended group session  Interactive Complexity: No    Patient's response to the group topic/interactions:  cooperative with task    Patient appeared to be Actively participating.       Client specific details:  Client reports feeling calm and anxious. Client used drawing and music as skills. Client reports watching tv and doing art last night. Client reports considering processing today, although unsure. Client working on completing her assignments.     Diary Card Ratings:  Suicide ideation: 0 Action:  No.  Self-harm thoughts: 0  Action:  No.             No

## 2023-11-19 NOTE — ED PROVIDER NOTE - CHIEF COMPLAINT
The patient is a 75y Female complaining of flu-like symptoms.
Patient with one or more new problems requiring additional work-up/treatment.

## 2023-12-24 NOTE — H&P PST ADULT - LAB RESULTS AND INTERPRETATION
ASHLEY PRITCHARD  MRN-[id number]  Patient is a 74y old  Male who presents with a chief complaint of Cardiac Arrest (24 Dec 2023 07:40)    HPI:  74M w/ PMH HTN HLD presents as transfer from [facility] s/p cardiac arrest. Per reports patient had a witness fall and arrest at home. EMS performed CPR en route to hospital was noted to be in VTach and shocked x2. ROSC was obtained just prior to arrival at [facility]. Pt was intubated placed on levo gtt. On arrival pt noted to have lacerated to R forehead. Pt had CT scans that were negative for intracranial hemorrhage, C-spine fracture, facial fractures. A R frontal hematoma was noted.     On arrival patient w/ dried blood on scalp. Also noted to have bleeding from oral mucosa w/ clots requiring suctioning. Pt otherwise off sedation and non-responsive. Per EMS report pt did receive some sedation and paralytics at [facility].     Per family patient has been feeling 'off' but managing his day to day and did not see a physician. Pt prescribed klonopin by outpatient provider and family believes pt may have been taking more than prescribed. On day of arrest, pt was initially asymptomatic carrying out his routine. He went to the bathroom, wife heard a loud thud and found patient in a pool of blood prompting call to EMS. Pt has not followed with any cardiologist. Has not had any syncope or other events preceding this.  (19 Dec 2023 14:54)      Hospital Course:    24 HOUR EVENTS:    REVIEW OF SYSTEMS:    CONSTITUTIONAL: No weakness, fevers or chills  EYES/ENT: No visual changes;  No vertigo or throat pain   NECK: No pain or stiffness  RESPIRATORY: No cough, wheezing, hemoptysis; No shortness of breath  CARDIOVASCULAR: No chest pain or palpitations  GASTROINTESTINAL: No abdominal or epigastric pain. No nausea, vomiting, or hematemesis; No diarrhea or constipation. No melena or hematochezia.  GENITOURINARY: No dysuria, frequency or hematuria  NEUROLOGICAL: No numbness or weakness  SKIN: No itching, rashes      ICU Vital Signs Last 24 Hrs  T(C): 37.9 (24 Dec 2023 16:00), Max: 38.6 (24 Dec 2023 12:00)  T(F): 100.2 (24 Dec 2023 16:00), Max: 101.5 (24 Dec 2023 12:00)  HR: 60 (24 Dec 2023 20:00) (51 - 65)  BP: --  BP(mean): --  ABP: 171/45 (24 Dec 2023 20:00) (90/34 - 171/45)  ABP(mean): 75 (24 Dec 2023 20:00) (49 - 83)  RR: 19 (24 Dec 2023 20:00) (15 - 28)  SpO2: 100% (24 Dec 2023 20:00) (100% - 100%)    O2 Parameters below as of 24 Dec 2023 20:00  Patient On (Oxygen Delivery Method): ventilator    O2 Concentration (%): 40      Mode: AC/ CMV (Assist Control/ Continuous Mandatory Ventilation), RR (machine): 14, TV (machine): 500, FiO2: 40, PEEP: 5, ITime: 1  CVP(mm Hg): --  CO: --  CI: --  PA: --  PA(mean): --  PA(direct): --  PCWP: --  LA: --  RA: --  SVR: --  SVRI: --  PVR: --  PVRI: --  I&O's Summary    23 Dec 2023 07:01  -  24 Dec 2023 07:00  --------------------------------------------------------  IN: 1793.3 mL / OUT: 1305 mL / NET: 488.3 mL    24 Dec 2023 07:01  -  24 Dec 2023 20:48  --------------------------------------------------------  IN: 659 mL / OUT: 640 mL / NET: 19 mL        CAPILLARY BLOOD GLUCOSE    CAPILLARY BLOOD GLUCOSE      POCT Blood Glucose.: 204 mg/dL (24 Dec 2023 18:00)      PHYSICAL EXAM:  GENERAL: No acute distress, well-developed  HEAD:  Atraumatic, Normocephalic  EYES: EOMI, PERRLA, conjunctiva and sclera clear  NECK: Supple, no lymphadenopathy, no JVD  CHEST/LUNG: CTAB; No wheezes, rales, or rhonchi  HEART: Regular rate and rhythm. Normal S1/S2. No murmurs, rubs, or gallops  ABDOMEN: Soft, non-tender, non-distended; normal bowel sounds, no organomegaly  EXTREMITIES:  2+ peripheral pulses b/l, No clubbing, cyanosis, or edema  NEUROLOGY: A&O x 3, no focal deficits  SKIN: No rashes or lesions    ============================I/O===========================   I&O's Detail    23 Dec 2023 07:01  -  24 Dec 2023 07:00  --------------------------------------------------------  IN:    Dexmedetomidine: 133.3 mL    IV PiggyBack: 700 mL    Jevity 1.2: 960 mL  Total IN: 1793.3 mL    OUT:    Indwelling Catheter - Urethral (mL): 1305 mL    Voided (mL): 0 mL  Total OUT: 1305 mL    Total NET: 488.3 mL      24 Dec 2023 07:01  -  24 Dec 2023 20:48  --------------------------------------------------------  IN:    Dexmedetomidine: 91 mL    Heparin: 48 mL    Jevity 1.2: 520 mL  Total IN: 659 mL    OUT:    Indwelling Catheter - Urethral (mL): 640 mL  Total OUT: 640 mL    Total NET: 19 mL        ============================ LABS =========================                        7.0    5.82  )-----------( 99       ( 24 Dec 2023 01:04 )             21.1     12-24    145  |  111<H>  |  68<H>  ----------------------------<  182<H>  3.9   |  23  |  2.03<H>    Ca    7.9<L>      24 Dec 2023 01:04  Phos  3.7     12-24  Mg     2.7     12-24    TPro  5.5<L>  /  Alb  3.3  /  TBili  1.1  /  DBili  x   /  AST  55<H>  /  ALT  65<H>  /  AlkPhos  43  12-24                LIVER FUNCTIONS - ( 24 Dec 2023 01:04 )  Alb: 3.3 g/dL / Pro: 5.5 g/dL / ALK PHOS: 43 U/L / ALT: 65 U/L / AST: 55 U/L / GGT: x           PT/INR - ( 24 Dec 2023 01:04 )   PT: 12.0 sec;   INR: 1.09 ratio         PTT - ( 24 Dec 2023 01:04 )  PTT:30.8 sec  ABG - ( 24 Dec 2023 00:50 )  pH, Arterial: 7.45  pH, Blood: x     /  pCO2: 34    /  pO2: 183   / HCO3: 24    / Base Excess: -0.3  /  SaO2: 98.9              Blood Gas Arterial, Lactate: 0.9 mmol/L (12-24-23 @ 00:50)  Blood Gas Arterial, Lactate: 1.2 mmol/L (12-23-23 @ 00:50)  Blood Gas Arterial, Lactate: 1.4 mmol/L (12-22-23 @ 00:43)    Urinalysis Basic - ( 24 Dec 2023 01:04 )    Color: x / Appearance: x / SG: x / pH: x  Gluc: 182 mg/dL / Ketone: x  / Bili: x / Urobili: x   Blood: x / Protein: x / Nitrite: x   Leuk Esterase: x / RBC: x / WBC x   Sq Epi: x / Non Sq Epi: x / Bacteria: x      ======================Micro/Rad/Cardio=================  Telemtry: Reviewed   EKG: Reviewed  CXR: Reviewed  Culture: Reviewed   Echo:   Cath:   ======================================================  PAST MEDICAL & SURGICAL HISTORY:  HTN (hypertension)      HLD (hyperlipidemia)  ====================ASSESSMENT ==============  74M w/ PMH HTN HLD presents as transfer from [facility] s/p VT arrest s/p shock x2 w/ unknown downtime. Pt found to have trauma to head w/ superficial scalp bleeding and oral mucosal bleeding.     Plan:  ====================== NEUROLOGY=====================  #Intubated  Encephalopathy post arrest   - Intubated w/o sedation s/p cardiac arrest w/ unknown down time. Reported to be 20minutes but can be longer given ROSC achieved right before arrival to the hospital   - CTH at [facility] w/o intracranial bleed/pathology. CTH maybe too soon to show evidence of anoxic injury  - Pt remains w/o purposeful movement.   - Neuro consulted for prognostication  - VEEG in place - no epileptiform activity; diffuse cerebral dysfunction   - Repeat CTH shows stability of bleed and emerging signs of anoxic injury  - f/u neuron specific enolase  - Family meeting w/ neurology attending 12/22 discussed no meaningful recovery. Pending family decision on comfort care.     #SAH  #SDH  - Pt w/ acute SAH and SDH 2/2 trauma  - Was not initially seen in outside scan at [facility] likely performed too early for radiologic evidence  - Neurology following,  - NSGY: no intervention  - Repeat CTH shows stability of bleeds   - Maintain BP control   - Started on HSQ for DVT ppx, will obtain repeat CT scan 12/23/23  - Would need full AC for DVTs. Will discuss w/ NSGY    #C-spine trauma  Pt w/ fall at home presented w/ c-collar.   -Per [facility] radiology report no evidence of cervical fracture  -Trauma surgery cleared pt of Cspine collar    #Myoclonic jerking  - vEEG: evidence of mycolonic seizures  - Started keppra load  - d/c VEEG    ==================== RESPIRATORY======================  #Intubated  Pt intubated s/p cardiac arrest  - On full vent support: RR 14 /  / PEEP 5 / FiO2 40  - Monitor ABGs    #Oropharynx bleed  - Pt w/ blood in oropharynx requiring frequent suctioning.   - ENT consulted s/p packing     ====================CARDIOVASCULAR==================  #VT arrest  - Pt s/p VT arrest w/ unknown downtime. Pt reportedly received shock x2. No prior cardiac hx per family  - Check TTE   - Check cardiac enzymes, BNP  - Monitor for arrhythmias   - Pt not asa/plavix loaded 2/2 substernal hematoma noted on CT chest at [facility]  - Unlikely to be a candidate for C at this time given mental status     #HTN  - Maintain BP control iso brain bleed  - Amlodipine; coreg  - PRN IVP labetalol for elevated SBP  - SBP goal<160    ===================HEMATOLOGIC/ONC ===================  #Substernal hematoma  - Pt w/ substernal hematoma noted on imaging at [facility] from trauma/cpr  - Repeat CT chest to monitor hematoma  - A/C: HSQ for dvt    #Thrombocytopenia  - Pt w/ downtrending PLT. Started downtrending prior to HSQ initiation.   - Likely iso critical illness   - Will send blue top PLT w/ next CBC    ===================== RENAL =========================  Pt presented w/ SCr 1.16, likely near baseline  - trend Cr   - montior I&Os    ==================== GASTROINTESTINAL===================  - Diet: enteral feeds   - Protonix    =======================    ENDOCRINE  =====================  #Hypothyroidism  - A1c=5.5.   - monitor glucose    ========================INFECTIOUS DISEASE================  #Sepsis  - Pt w/ leukocytosis w/ fevers possibly 2/2  infection  - Pt w/ persistent fevers likely multifactorial 2/2 DVTs, underlying infection, possible ?neurogenic component  - U/A grossly positive. F/u BCx, UCx  - Pt had crash lines placed at [facility]. Possible source of infection. MRSA negative 12/20  - Sputum Cx + for klebsiella  - 12/22 BCx 1 bottle w/ MRSE. Possible contaminant. Check repeat BCx  - Broaden to zosyn, vanc      Patient requires continuous monitoring with bedside rhythm monitoring, pulse ox monitoring, and intermittent blood gas analysis. Care plan discussed with ICU care team. Patient remained critical and at risk for life threatening decompensation.  Patient seen, examined and plan discussed with CCU team during rounds.     I have personally provided ____ minutes of critical care time excluding time spent on separate procedures, in addition to initial critical care time provided by the CICU Attending, Dr. Mejia.    By signing my name below, I, Kristy Ding, attest that this documentation has been prepared under the direction and in the presence of JEANNIE Cohen.   Electronically signed: Emilia Franklin, 12-24-23 @ 20:48    I, Aziza Ross, personally performed the services described in this documentation. all medical record entries made by the rezaibe were at my direction and in my presence. I have reviewed the chart and agree that the record reflects my personal performance and is accurate and complete  Electronically signed: JEANNIE Cohen.       ASHLEY PRITCHARD  MRN-[id number]  Patient is a 74y old  Male who presents with a chief complaint of Cardiac Arrest (24 Dec 2023 07:40)    HPI:  74M w/ PMH HTN HLD presents as transfer from [facility] s/p cardiac arrest. Per reports patient had a witness fall and arrest at home. EMS performed CPR en route to hospital was noted to be in VTach and shocked x2. ROSC was obtained just prior to arrival at [facility]. Pt was intubated placed on levo gtt. On arrival pt noted to have lacerated to R forehead. Pt had CT scans that were negative for intracranial hemorrhage, C-spine fracture, facial fractures. A R frontal hematoma was noted.     On arrival patient w/ dried blood on scalp. Also noted to have bleeding from oral mucosa w/ clots requiring suctioning. Pt otherwise off sedation and non-responsive. Per EMS report pt did receive some sedation and paralytics at [facility].     Per family patient has been feeling 'off' but managing his day to day and did not see a physician. Pt prescribed klonopin by outpatient provider and family believes pt may have been taking more than prescribed. On day of arrest, pt was initially asymptomatic carrying out his routine. He went to the bathroom, wife heard a loud thud and found patient in a pool of blood prompting call to EMS. Pt has not followed with any cardiologist. Has not had any syncope or other events preceding this.  (19 Dec 2023 14:54)      Hospital Course:    24 HOUR EVENTS:    REVIEW OF SYSTEMS:    CONSTITUTIONAL: No weakness, fevers or chills  EYES/ENT: No visual changes;  No vertigo or throat pain   NECK: No pain or stiffness  RESPIRATORY: No cough, wheezing, hemoptysis; No shortness of breath  CARDIOVASCULAR: No chest pain or palpitations  GASTROINTESTINAL: No abdominal or epigastric pain. No nausea, vomiting, or hematemesis; No diarrhea or constipation. No melena or hematochezia.  GENITOURINARY: No dysuria, frequency or hematuria  NEUROLOGICAL: No numbness or weakness  SKIN: No itching, rashes      ICU Vital Signs Last 24 Hrs  T(C): 37.9 (24 Dec 2023 16:00), Max: 38.6 (24 Dec 2023 12:00)  T(F): 100.2 (24 Dec 2023 16:00), Max: 101.5 (24 Dec 2023 12:00)  HR: 60 (24 Dec 2023 20:00) (51 - 65)  BP: --  BP(mean): --  ABP: 171/45 (24 Dec 2023 20:00) (90/34 - 171/45)  ABP(mean): 75 (24 Dec 2023 20:00) (49 - 83)  RR: 19 (24 Dec 2023 20:00) (15 - 28)  SpO2: 100% (24 Dec 2023 20:00) (100% - 100%)    O2 Parameters below as of 24 Dec 2023 20:00  Patient On (Oxygen Delivery Method): ventilator    O2 Concentration (%): 40      Mode: AC/ CMV (Assist Control/ Continuous Mandatory Ventilation), RR (machine): 14, TV (machine): 500, FiO2: 40, PEEP: 5, ITime: 1  CVP(mm Hg): --  CO: --  CI: --  PA: --  PA(mean): --  PA(direct): --  PCWP: --  LA: --  RA: --  SVR: --  SVRI: --  PVR: --  PVRI: --  I&O's Summary    23 Dec 2023 07:01  -  24 Dec 2023 07:00  --------------------------------------------------------  IN: 1793.3 mL / OUT: 1305 mL / NET: 488.3 mL    24 Dec 2023 07:01  -  24 Dec 2023 20:48  --------------------------------------------------------  IN: 659 mL / OUT: 640 mL / NET: 19 mL        CAPILLARY BLOOD GLUCOSE    CAPILLARY BLOOD GLUCOSE      POCT Blood Glucose.: 204 mg/dL (24 Dec 2023 18:00)      PHYSICAL EXAM:  GENERAL: No acute distress, well-developed  HEAD:  Atraumatic, Normocephalic  EYES: EOMI, PERRLA, conjunctiva and sclera clear  NECK: Supple, no lymphadenopathy, no JVD  CHEST/LUNG: CTAB; No wheezes, rales, or rhonchi  HEART: Regular rate and rhythm. Normal S1/S2. No murmurs, rubs, or gallops  ABDOMEN: Soft, non-tender, non-distended; normal bowel sounds, no organomegaly  EXTREMITIES:  2+ peripheral pulses b/l, No clubbing, cyanosis, or edema  NEUROLOGY: A&O x 3, no focal deficits  SKIN: No rashes or lesions    ============================I/O===========================   I&O's Detail    23 Dec 2023 07:01  -  24 Dec 2023 07:00  --------------------------------------------------------  IN:    Dexmedetomidine: 133.3 mL    IV PiggyBack: 700 mL    Jevity 1.2: 960 mL  Total IN: 1793.3 mL    OUT:    Indwelling Catheter - Urethral (mL): 1305 mL    Voided (mL): 0 mL  Total OUT: 1305 mL    Total NET: 488.3 mL      24 Dec 2023 07:01  -  24 Dec 2023 20:48  --------------------------------------------------------  IN:    Dexmedetomidine: 91 mL    Heparin: 48 mL    Jevity 1.2: 520 mL  Total IN: 659 mL    OUT:    Indwelling Catheter - Urethral (mL): 640 mL  Total OUT: 640 mL    Total NET: 19 mL        ============================ LABS =========================                        7.0    5.82  )-----------( 99       ( 24 Dec 2023 01:04 )             21.1     12-24    145  |  111<H>  |  68<H>  ----------------------------<  182<H>  3.9   |  23  |  2.03<H>    Ca    7.9<L>      24 Dec 2023 01:04  Phos  3.7     12-24  Mg     2.7     12-24    TPro  5.5<L>  /  Alb  3.3  /  TBili  1.1  /  DBili  x   /  AST  55<H>  /  ALT  65<H>  /  AlkPhos  43  12-24                LIVER FUNCTIONS - ( 24 Dec 2023 01:04 )  Alb: 3.3 g/dL / Pro: 5.5 g/dL / ALK PHOS: 43 U/L / ALT: 65 U/L / AST: 55 U/L / GGT: x           PT/INR - ( 24 Dec 2023 01:04 )   PT: 12.0 sec;   INR: 1.09 ratio         PTT - ( 24 Dec 2023 01:04 )  PTT:30.8 sec  ABG - ( 24 Dec 2023 00:50 )  pH, Arterial: 7.45  pH, Blood: x     /  pCO2: 34    /  pO2: 183   / HCO3: 24    / Base Excess: -0.3  /  SaO2: 98.9              Blood Gas Arterial, Lactate: 0.9 mmol/L (12-24-23 @ 00:50)  Blood Gas Arterial, Lactate: 1.2 mmol/L (12-23-23 @ 00:50)  Blood Gas Arterial, Lactate: 1.4 mmol/L (12-22-23 @ 00:43)    Urinalysis Basic - ( 24 Dec 2023 01:04 )    Color: x / Appearance: x / SG: x / pH: x  Gluc: 182 mg/dL / Ketone: x  / Bili: x / Urobili: x   Blood: x / Protein: x / Nitrite: x   Leuk Esterase: x / RBC: x / WBC x   Sq Epi: x / Non Sq Epi: x / Bacteria: x      ======================Micro/Rad/Cardio=================  Telemtry: Reviewed   EKG: Reviewed  CXR: Reviewed  Culture: Reviewed   Echo:   Cath:   ======================================================  PAST MEDICAL & SURGICAL HISTORY:  HTN (hypertension)      HLD (hyperlipidemia)  ====================ASSESSMENT ==============  74M w/ PMH HTN HLD presents as transfer from [facility] s/p VT arrest s/p shock x2 w/ unknown downtime. Pt found to have trauma to head w/ superficial scalp bleeding and oral mucosal bleeding.     Plan:  ====================== NEUROLOGY=====================  #Intubated  Encephalopathy post arrest   - Intubated w/o sedation s/p cardiac arrest w/ unknown down time. Reported to be 20minutes but can be longer given ROSC achieved right before arrival to the hospital   - CTH at [facility] w/o intracranial bleed/pathology. CTH maybe too soon to show evidence of anoxic injury  - Pt remains w/o purposeful movement.   - Neuro consulted for prognostication  - VEEG in place - no epileptiform activity; diffuse cerebral dysfunction   - Repeat CTH shows stability of bleed and emerging signs of anoxic injury  - f/u neuron specific enolase  - Family meeting w/ neurology attending 12/22 discussed no meaningful recovery. Pending family decision on comfort care.     #SAH  #SDH  - Pt w/ acute SAH and SDH 2/2 trauma  - Was not initially seen in outside scan at [facility] likely performed too early for radiologic evidence  - Neurology following,  - NSGY: no intervention  - Repeat CTH shows stability of bleeds   - Maintain BP control   - Started on HSQ for DVT ppx, will obtain repeat CT scan 12/23/23  - Would need full AC for DVTs. Will discuss w/ NSGY    #C-spine trauma  Pt w/ fall at home presented w/ c-collar.   -Per [facility] radiology report no evidence of cervical fracture  -Trauma surgery cleared pt of Cspine collar    #Myoclonic jerking  - vEEG: evidence of mycolonic seizures  - Started keppra load  - d/c VEEG    ==================== RESPIRATORY======================  #Intubated  Pt intubated s/p cardiac arrest  - On full vent support: RR 14 /  / PEEP 5 / FiO2 40  - Monitor ABGs    #Oropharynx bleed  - Pt w/ blood in oropharynx requiring frequent suctioning.   - ENT consulted s/p packing     ====================CARDIOVASCULAR==================  #VT arrest  - Pt s/p VT arrest w/ unknown downtime. Pt reportedly received shock x2. No prior cardiac hx per family  - Check TTE   - Check cardiac enzymes, BNP  - Monitor for arrhythmias   - Pt not asa/plavix loaded 2/2 substernal hematoma noted on CT chest at [facility]  - Unlikely to be a candidate for C at this time given mental status     #HTN  - Maintain BP control iso brain bleed  - Amlodipine; coreg  - PRN IVP labetalol for elevated SBP  - SBP goal<160    ===================HEMATOLOGIC/ONC ===================  #Substernal hematoma  - Pt w/ substernal hematoma noted on imaging at [facility] from trauma/cpr  - Repeat CT chest to monitor hematoma  - A/C: HSQ for dvt    #Thrombocytopenia  - Pt w/ downtrending PLT. Started downtrending prior to HSQ initiation.   - Likely iso critical illness   - Will send blue top PLT w/ next CBC    ===================== RENAL =========================  Pt presented w/ SCr 1.16, likely near baseline  - trend Cr   - montior I&Os    ==================== GASTROINTESTINAL===================  - Diet: enteral feeds   - Protonix    =======================    ENDOCRINE  =====================  #Hypothyroidism  - A1c=5.5.   - monitor glucose    ========================INFECTIOUS DISEASE================  #Sepsis  - Pt w/ leukocytosis w/ fevers possibly 2/2  infection  - Pt w/ persistent fevers likely multifactorial 2/2 DVTs, underlying infection, possible ?neurogenic component  - U/A grossly positive. F/u BCx, UCx  - Pt had crash lines placed at [facility]. Possible source of infection. MRSA negative 12/20  - Sputum Cx + for klebsiella  - 12/22 BCx 1 bottle w/ MRSE. Possible contaminant. Check repeat BCx  - Broaden to zosyn, vanc      Patient requires continuous monitoring with bedside rhythm monitoring, pulse ox monitoring, and intermittent blood gas analysis. Care plan discussed with ICU care team. Patient remained critical and at risk for life threatening decompensation.  Patient seen, examined and plan discussed with CCU team during rounds.     I have personally provided ____ minutes of critical care time excluding time spent on separate procedures, in addition to initial critical care time provided by the CICU Attending, Dr. Mejia.    By signing my name below, I, Kristy Ding, attest that this documentation has been prepared under the direction and in the presence of JEANNIE Cohen.   Electronically signed: Emilia Franklin, 12-24-23 @ 20:48    I, Aziza Ross, personally performed the services described in this documentation. all medical record entries made by the rezaibe were at my direction and in my presence. I have reviewed the chart and agree that the record reflects my personal performance and is accurate and complete  Electronically signed: JEANNIE Cohen.       ASHLEY PRITCHARD  MRN-[id number]  Patient is a 74y old  Male who presents with a chief complaint of Cardiac Arrest (24 Dec 2023 07:40)    HPI:  74M w/ PMH HTN HLD presents as transfer from [facility] s/p cardiac arrest. Per reports patient had a witness fall and arrest at home. EMS performed CPR en route to hospital was noted to be in VTach and shocked x2. ROSC was obtained just prior to arrival at [facility]. Pt was intubated placed on levo gtt. On arrival pt noted to have lacerated to R forehead. Pt had CT scans that were negative for intracranial hemorrhage, C-spine fracture, facial fractures. A R frontal hematoma was noted.     On arrival patient w/ dried blood on scalp. Also noted to have bleeding from oral mucosa w/ clots requiring suctioning. Pt otherwise off sedation and non-responsive. Per EMS report pt did receive some sedation and paralytics at [facility].     Per family patient has been feeling 'off' but managing his day to day and did not see a physician. Pt prescribed klonopin by outpatient provider and family believes pt may have been taking more than prescribed. On day of arrest, pt was initially asymptomatic carrying out his routine. He went to the bathroom, wife heard a loud thud and found patient in a pool of blood prompting call to EMS. Pt has not followed with any cardiologist. Has not had any syncope or other events preceding this.  (19 Dec 2023 14:54)    24 HOUR EVENTS:  - started on heparin gtt for full AC i/s/o DVTs with ptt goal 50-60    ICU Vital Signs Last 24 Hrs  T(C): 37.9 (24 Dec 2023 16:00), Max: 38.6 (24 Dec 2023 12:00)  T(F): 100.2 (24 Dec 2023 16:00), Max: 101.5 (24 Dec 2023 12:00)  HR: 60 (24 Dec 2023 20:00) (51 - 65)  ABP: 171/45 (24 Dec 2023 20:00) (90/34 - 171/45)  ABP(mean): 75 (24 Dec 2023 20:00) (49 - 83)  RR: 19 (24 Dec 2023 20:00) (15 - 28)  SpO2: 100% (24 Dec 2023 20:00) (100% - 100%)    O2 Parameters below as of 24 Dec 2023 20:00  Patient On (Oxygen Delivery Method): ventilator    O2 Concentration (%): 40    Mode: AC/ CMV (Assist Control/ Continuous Mandatory Ventilation), RR (machine): 14, TV (machine): 500, FiO2: 40, PEEP: 5, ITime: 1    I&O's Summary    23 Dec 2023 07:01  -  24 Dec 2023 07:00  --------------------------------------------------------  IN: 1793.3 mL / OUT: 1305 mL / NET: 488.3 mL    24 Dec 2023 07:01  -  24 Dec 2023 20:48  --------------------------------------------------------  IN: 659 mL / OUT: 640 mL / NET: 19 mL    CAPILLARY BLOOD GLUCOSE  POCT Blood Glucose.: 204 mg/dL (24 Dec 2023 18:00)    PHYSICAL EXAM:  GENERAL: intubated, sedated on precedex  HEAD: Traumatic, R eye hematoma s/p fall  CHEST/LUNG: CTAB; No wheezes, rales, or rhonchi  HEART: Regular rate and rhythm. Normal S1/S2. No murmurs, rubs, or gallops  ABDOMEN: Soft, non-tender, non-distended; normal bowel sounds, no organomegaly  EXTREMITIES: No clubbing, cyanosis, or edema  NEUROLOGY: intubated, currently sedated on precedex    ============================I/O===========================   I&O's Detail    23 Dec 2023 07:01  -  24 Dec 2023 07:00  --------------------------------------------------------  IN:    Dexmedetomidine: 133.3 mL    IV PiggyBack: 700 mL    Jevity 1.2: 960 mL  Total IN: 1793.3 mL    OUT:    Indwelling Catheter - Urethral (mL): 1305 mL    Voided (mL): 0 mL  Total OUT: 1305 mL    Total NET: 488.3 mL      24 Dec 2023 07:01  -  24 Dec 2023 20:48  --------------------------------------------------------  IN:    Dexmedetomidine: 91 mL    Heparin: 48 mL    Jevity 1.2: 520 mL  Total IN: 659 mL    OUT:    Indwelling Catheter - Urethral (mL): 640 mL  Total OUT: 640 mL    Total NET: 19 mL    ============================ LABS =========================                        7.0    5.82  )-----------( 99       ( 24 Dec 2023 01:04 )             21.1     12-24    145  |  111<H>  |  68<H>  ----------------------------<  182<H>  3.9   |  23  |  2.03<H>    Ca    7.9<L>      24 Dec 2023 01:04  Phos  3.7     12-24  Mg     2.7     12-24    TPro  5.5<L>  /  Alb  3.3  /  TBili  1.1  /  DBili  x   /  AST  55<H>  /  ALT  65<H>  /  AlkPhos  43  12-24    LIVER FUNCTIONS - ( 24 Dec 2023 01:04 )  Alb: 3.3 g/dL / Pro: 5.5 g/dL / ALK PHOS: 43 U/L / ALT: 65 U/L / AST: 55 U/L / GGT: x           PT/INR - ( 24 Dec 2023 01:04 )   PT: 12.0 sec;   INR: 1.09 ratio       PTT - ( 24 Dec 2023 01:04 )  PTT:30.8 sec  ABG - ( 24 Dec 2023 00:50 )  pH, Arterial: 7.45  pH, Blood: x     /  pCO2: 34    /  pO2: 183   / HCO3: 24    / Base Excess: -0.3  /  SaO2: 98.9      Blood Gas Arterial, Lactate: 0.9 mmol/L (12-24-23 @ 00:50)  Blood Gas Arterial, Lactate: 1.2 mmol/L (12-23-23 @ 00:50)  Blood Gas Arterial, Lactate: 1.4 mmol/L (12-22-23 @ 00:43)    Urinalysis Basic - ( 24 Dec 2023 01:04 )    Color: x / Appearance: x / SG: x / pH: x  Gluc: 182 mg/dL / Ketone: x  / Bili: x / Urobili: x   Blood: x / Protein: x / Nitrite: x   Leuk Esterase: x / RBC: x / WBC x   Sq Epi: x / Non Sq Epi: x / Bacteria: x    ======================Micro/Rad/Cardio=================  Telemtry: Reviewed   EKG: Reviewed  CXR: Reviewed  Culture: Reviewed   Echo:   Cath:   ======================================================  PAST MEDICAL & SURGICAL HISTORY:  HTN (hypertension)      HLD (hyperlipidemia)    ====================ASSESSMENT ==============  74M w/ PMH HTN HLD presents as transfer from [facility] s/p VT arrest s/p shock x2 w/ unknown downtime. Pt found to have trauma to head w/ superficial scalp bleeding and oral mucosal bleeding.     Plan:  ====================== NEUROLOGY=====================  #Intubated  Encephalopathy post arrest   - Intubated w/o sedation s/p cardiac arrest w/ unknown down time. Reported to be 20-40minutes but can be longer given ROSC achieved right before arrival to the hospital   - CTH at [facility] w/o intracranial bleed/pathology. CTH maybe too soon to show evidence of anoxic injury  - Pt remains w/o purposeful movement.   - Neuro consulted for prognostication  - VEEG in place - no epileptiform activity; diffuse cerebral dysfunction   - Repeat CTH shows stability of bleed and emerging signs of anoxic injury  - f/u neuron specific enolase  - Family meeting w/ neurology attending 12/22 discussed no meaningful recovery. Pending family decision on comfort care.     #SAH  #SDH  - Pt w/ acute SAH and SDH 2/2 trauma  - Was not initially seen in outside scan at [facility] likely performed too early for radiologic evidence  - Neurology following,  - NSGY: no intervention  - Repeat CTH shows stability of bleeds   - Maintain BP control   - Started on HSQ for DVT ppx, will obtain repeat CT scan 12/23/23  - Would need full AC for DVTs. Will discuss w/ NSGY    #C-spine trauma  Pt w/ fall at home presented w/ c-collar.   -Per [facility] radiology report no evidence of cervical fracture  -Trauma surgery cleared pt of Cspine collar    #Myoclonic jerking  - vEEG: evidence of mycolonic seizures  - Started keppra load  - d/c VEEG    ==================== RESPIRATORY======================  #Intubated  Pt intubated s/p cardiac arrest  - On full vent support: RR 14 /  / PEEP 5 / FiO2 40  - Monitor ABGs    #Oropharynx bleed  - Pt w/ blood in oropharynx requiring frequent suctioning.   - ENT consulted s/p packing     ====================CARDIOVASCULAR==================  #VT arrest  - Pt s/p VT arrest w/ unknown downtime. Pt reportedly received shock x2. No prior cardiac hx per family  - Check TTE   - Check cardiac enzymes, BNP  - Monitor for arrhythmias   - Pt not asa/plavix loaded 2/2 substernal hematoma noted on CT chest at [facility]  - Unlikely to be a candidate for LHC at this time given mental status     #HTN  - Maintain BP control iso brain bleed  - Amlodipine; coreg  - PRN IVP labetalol for elevated SBP  - SBP goal<160    ===================HEMATOLOGIC/ONC ===================  #Substernal hematoma  - Pt w/ substernal hematoma noted on imaging at [facility] from trauma/cpr  - Repeat CT chest to monitor hematoma  - A/C: HSQ for dvt    #Thrombocytopenia  - Pt w/ downtrending PLT. Started downtrending prior to HSQ initiation.   - Likely iso critical illness   - Will send blue top PLT w/ next CBC    ===================== RENAL =========================  Pt presented w/ SCr 1.16, likely near baseline  - trend Cr   - montior I&Os    ==================== GASTROINTESTINAL===================  - Diet: enteral feeds   - Protonix    =======================    ENDOCRINE  =====================  #Hypothyroidism  - A1c=5.5.   - monitor glucose    ========================INFECTIOUS DISEASE================  #Sepsis  - Pt w/ leukocytosis w/ fevers possibly 2/2  infection  - Pt w/ persistent fevers likely multifactorial 2/2 DVTs, underlying infection, possible ?neurogenic component  - U/A grossly positive. F/u BCx, UCx  - Pt had crash lines placed at [facility]. Possible source of infection. MRSA negative 12/20  - Sputum Cx + for klebsiella  - 12/22 BCx 1 bottle w/ MRSE. Possible contaminant. Check repeat BCx  - Broaden to zosyn, vanc      Patient requires continuous monitoring with bedside rhythm monitoring, pulse ox monitoring, and intermittent blood gas analysis. Care plan discussed with ICU care team. Patient remained critical and at risk for life threatening decompensation.  Patient seen, examined and plan discussed with CCU team during rounds.     I have personally provided ____ minutes of critical care time excluding time spent on separate procedures, in addition to initial critical care time provided by the CICU Attending, Dr. Mejia.    By signing my name below, I, Kristy Ding, attest that this documentation has been prepared under the direction and in the presence of JEANNIE Cohen.   Electronically signed: Emilia Franklin, 12-24-23 @ 20:48    I, Aziza Ross, personally performed the services described in this documentation. all medical record entries made by the rezaibmary were at my direction and in my presence. I have reviewed the chart and agree that the record reflects my personal performance and is accurate and complete  Electronically signed: JEANNIE Cohen.       ASHLEY PRITCHARD  MRN-[id number]  Patient is a 74y old  Male who presents with a chief complaint of Cardiac Arrest (24 Dec 2023 07:40)    HPI:  74M w/ PMH HTN HLD presents as transfer from [facility] s/p cardiac arrest. Per reports patient had a witness fall and arrest at home. EMS performed CPR en route to hospital was noted to be in VTach and shocked x2. ROSC was obtained just prior to arrival at [facility]. Pt was intubated placed on levo gtt. On arrival pt noted to have lacerated to R forehead. Pt had CT scans that were negative for intracranial hemorrhage, C-spine fracture, facial fractures. A R frontal hematoma was noted.     On arrival patient w/ dried blood on scalp. Also noted to have bleeding from oral mucosa w/ clots requiring suctioning. Pt otherwise off sedation and non-responsive. Per EMS report pt did receive some sedation and paralytics at [facility].     Per family patient has been feeling 'off' but managing his day to day and did not see a physician. Pt prescribed klonopin by outpatient provider and family believes pt may have been taking more than prescribed. On day of arrest, pt was initially asymptomatic carrying out his routine. He went to the bathroom, wife heard a loud thud and found patient in a pool of blood prompting call to EMS. Pt has not followed with any cardiologist. Has not had any syncope or other events preceding this.  (19 Dec 2023 14:54)    24 HOUR EVENTS:  - started on heparin gtt for full AC i/s/o DVTs with ptt goal 50-60    ICU Vital Signs Last 24 Hrs  T(C): 37.9 (24 Dec 2023 16:00), Max: 38.6 (24 Dec 2023 12:00)  T(F): 100.2 (24 Dec 2023 16:00), Max: 101.5 (24 Dec 2023 12:00)  HR: 60 (24 Dec 2023 20:00) (51 - 65)  ABP: 171/45 (24 Dec 2023 20:00) (90/34 - 171/45)  ABP(mean): 75 (24 Dec 2023 20:00) (49 - 83)  RR: 19 (24 Dec 2023 20:00) (15 - 28)  SpO2: 100% (24 Dec 2023 20:00) (100% - 100%)    O2 Parameters below as of 24 Dec 2023 20:00  Patient On (Oxygen Delivery Method): ventilator    O2 Concentration (%): 40    Mode: AC/ CMV (Assist Control/ Continuous Mandatory Ventilation), RR (machine): 14, TV (machine): 500, FiO2: 40, PEEP: 5, ITime: 1    I&O's Summary    23 Dec 2023 07:01  -  24 Dec 2023 07:00  --------------------------------------------------------  IN: 1793.3 mL / OUT: 1305 mL / NET: 488.3 mL    24 Dec 2023 07:01  -  24 Dec 2023 20:48  --------------------------------------------------------  IN: 659 mL / OUT: 640 mL / NET: 19 mL    CAPILLARY BLOOD GLUCOSE  POCT Blood Glucose.: 204 mg/dL (24 Dec 2023 18:00)    PHYSICAL EXAM:  GENERAL: intubated, sedated on precedex  HEAD: Traumatic, R eye hematoma s/p fall  CHEST/LUNG: CTAB; No wheezes, rales, or rhonchi  HEART: Regular rate and rhythm. Normal S1/S2. No murmurs, rubs, or gallops  ABDOMEN: Soft, non-tender, non-distended; normal bowel sounds, no organomegaly  EXTREMITIES: No clubbing, cyanosis, or edema  NEUROLOGY: intubated, currently sedated on precedex    ============================I/O===========================   I&O's Detail    23 Dec 2023 07:01  -  24 Dec 2023 07:00  --------------------------------------------------------  IN:    Dexmedetomidine: 133.3 mL    IV PiggyBack: 700 mL    Jevity 1.2: 960 mL  Total IN: 1793.3 mL    OUT:    Indwelling Catheter - Urethral (mL): 1305 mL    Voided (mL): 0 mL  Total OUT: 1305 mL    Total NET: 488.3 mL      24 Dec 2023 07:01  -  24 Dec 2023 20:48  --------------------------------------------------------  IN:    Dexmedetomidine: 91 mL    Heparin: 48 mL    Jevity 1.2: 520 mL  Total IN: 659 mL    OUT:    Indwelling Catheter - Urethral (mL): 640 mL  Total OUT: 640 mL    Total NET: 19 mL    ============================ LABS =========================                        7.0    5.82  )-----------( 99       ( 24 Dec 2023 01:04 )             21.1     12-24    145  |  111<H>  |  68<H>  ----------------------------<  182<H>  3.9   |  23  |  2.03<H>    Ca    7.9<L>      24 Dec 2023 01:04  Phos  3.7     12-24  Mg     2.7     12-24    TPro  5.5<L>  /  Alb  3.3  /  TBili  1.1  /  DBili  x   /  AST  55<H>  /  ALT  65<H>  /  AlkPhos  43  12-24    LIVER FUNCTIONS - ( 24 Dec 2023 01:04 )  Alb: 3.3 g/dL / Pro: 5.5 g/dL / ALK PHOS: 43 U/L / ALT: 65 U/L / AST: 55 U/L / GGT: x           PT/INR - ( 24 Dec 2023 01:04 )   PT: 12.0 sec;   INR: 1.09 ratio       PTT - ( 24 Dec 2023 01:04 )  PTT:30.8 sec  ABG - ( 24 Dec 2023 00:50 )  pH, Arterial: 7.45  pH, Blood: x     /  pCO2: 34    /  pO2: 183   / HCO3: 24    / Base Excess: -0.3  /  SaO2: 98.9      Blood Gas Arterial, Lactate: 0.9 mmol/L (12-24-23 @ 00:50)  Blood Gas Arterial, Lactate: 1.2 mmol/L (12-23-23 @ 00:50)  Blood Gas Arterial, Lactate: 1.4 mmol/L (12-22-23 @ 00:43)    Urinalysis Basic - ( 24 Dec 2023 01:04 )    Color: x / Appearance: x / SG: x / pH: x  Gluc: 182 mg/dL / Ketone: x  / Bili: x / Urobili: x   Blood: x / Protein: x / Nitrite: x   Leuk Esterase: x / RBC: x / WBC x   Sq Epi: x / Non Sq Epi: x / Bacteria: x    ======================Micro/Rad/Cardio=================  Telemtry: Reviewed   EKG: Reviewed  CXR: Reviewed  Culture: Reviewed   Echo:   Cath:   ======================================================  PAST MEDICAL & SURGICAL HISTORY:  HTN (hypertension)      HLD (hyperlipidemia)    ====================ASSESSMENT ==============  74M w/ PMH HTN HLD presents as transfer from [facility] s/p VT arrest s/p shock x2 w/ unknown downtime. Pt found to have trauma to head w/ superficial scalp bleeding and oral mucosal bleeding.     Plan:  ====================== NEUROLOGY=====================  #Intubated  Encephalopathy post arrest   - Intubated w/o sedation s/p cardiac arrest w/ unknown down time. Reported to be 20-40minutes but can be longer given ROSC achieved right before arrival to the hospital   - CTH at [facility] w/o intracranial bleed/pathology. CTH maybe too soon to show evidence of anoxic injury  - Pt remains w/o purposeful movement.   - Neuro consulted for prognostication  - VEEG in place - no epileptiform activity; diffuse cerebral dysfunction   - Repeat CTH shows stability of bleed and emerging signs of anoxic injury  - f/u neuron specific enolase  - Family meeting w/ neurology attending 12/22 discussed no meaningful recovery. Pending family decision on comfort care.     #SAH  #SDH  - Pt w/ acute SAH and SDH 2/2 trauma  - Was not initially seen in outside scan at [facility] likely performed too early for radiologic evidence  - Neurology following,  - NSGY: no intervention  - Repeat CTH shows stability of bleeds   - Maintain BP control   - Started on HSQ for DVT ppx, will obtain repeat CT scan 12/23/23  - Would need full AC for DVTs. Will discuss w/ NSGY    #C-spine trauma  Pt w/ fall at home presented w/ c-collar.   -Per [facility] radiology report no evidence of cervical fracture  -Trauma surgery cleared pt of Cspine collar    #Myoclonic jerking  - vEEG: evidence of mycolonic seizures  - Started keppra load  - d/c VEEG    ==================== RESPIRATORY======================  #Intubated  Pt intubated s/p cardiac arrest  - On full vent support: RR 14 /  / PEEP 5 / FiO2 40  - Monitor ABGs    #Oropharynx bleed  - Pt w/ blood in oropharynx requiring frequent suctioning.   - ENT consulted s/p packing     ====================CARDIOVASCULAR==================  #VT arrest  - Pt s/p VT arrest w/ unknown downtime. Pt reportedly received shock x2. No prior cardiac hx per family  - Check TTE   - Check cardiac enzymes, BNP  - Monitor for arrhythmias   - Pt not asa/plavix loaded 2/2 substernal hematoma noted on CT chest at [facility]  - Unlikely to be a candidate for LHC at this time given mental status     #HTN  - Maintain BP control iso brain bleed  - Amlodipine; coreg  - PRN IVP labetalol for elevated SBP  - SBP goal<160    ===================HEMATOLOGIC/ONC ===================  #Substernal hematoma  - Pt w/ substernal hematoma noted on imaging at [facility] from trauma/cpr  - Repeat CT chest to monitor hematoma  - A/C: HSQ for dvt    #Thrombocytopenia  - Pt w/ downtrending PLT. Started downtrending prior to HSQ initiation.   - Likely iso critical illness   - Will send blue top PLT w/ next CBC    ===================== RENAL =========================  Pt presented w/ SCr 1.16, likely near baseline  - trend Cr   - montior I&Os    ==================== GASTROINTESTINAL===================  - Diet: enteral feeds   - Protonix    =======================    ENDOCRINE  =====================  #Hypothyroidism  - A1c=5.5.   - monitor glucose    ========================INFECTIOUS DISEASE================  #Sepsis  - Pt w/ leukocytosis w/ fevers possibly 2/2  infection  - Pt w/ persistent fevers likely multifactorial 2/2 DVTs, underlying infection, possible ?neurogenic component  - U/A grossly positive. F/u BCx, UCx  - Pt had crash lines placed at [facility]. Possible source of infection. MRSA negative 12/20  - Sputum Cx + for klebsiella  - 12/22 BCx 1 bottle w/ MRSE. Possible contaminant. Check repeat BCx  - Broaden to zosyn, vanc      Patient requires continuous monitoring with bedside rhythm monitoring, pulse ox monitoring, and intermittent blood gas analysis. Care plan discussed with ICU care team. Patient remained critical and at risk for life threatening decompensation.  Patient seen, examined and plan discussed with CCU team during rounds.     I have personally provided ____ minutes of critical care time excluding time spent on separate procedures, in addition to initial critical care time provided by the CICU Attending, Dr. Mejia.    By signing my name below, I, Kristy Ding, attest that this documentation has been prepared under the direction and in the presence of JEANNIE Cohen.   Electronically signed: Emilia Franklin, 12-24-23 @ 20:48    I, Aziza Ross, personally performed the services described in this documentation. all medical record entries made by the rezaibmary were at my direction and in my presence. I have reviewed the chart and agree that the record reflects my personal performance and is accurate and complete  Electronically signed: JEANNIE Cohen.       ASHLEY PRITCHARD  MRN-[id number]  Patient is a 74y old  Male who presents with a chief complaint of Cardiac Arrest (24 Dec 2023 07:40)    HPI:  74M w/ PMH HTN HLD presents as transfer from [facility] s/p cardiac arrest. Per reports patient had a witness fall and arrest at home. EMS performed CPR en route to hospital was noted to be in VTach and shocked x2. ROSC was obtained just prior to arrival at [facility]. Pt was intubated placed on levo gtt. On arrival pt noted to have lacerated to R forehead. Pt had CT scans that were negative for intracranial hemorrhage, C-spine fracture, facial fractures. A R frontal hematoma was noted.     On arrival patient w/ dried blood on scalp. Also noted to have bleeding from oral mucosa w/ clots requiring suctioning. Pt otherwise off sedation and non-responsive. Per EMS report pt did receive some sedation and paralytics at [facility].     Per family patient has been feeling 'off' but managing his day to day and did not see a physician. Pt prescribed klonopin by outpatient provider and family believes pt may have been taking more than prescribed. On day of arrest, pt was initially asymptomatic carrying out his routine. He went to the bathroom, wife heard a loud thud and found patient in a pool of blood prompting call to EMS. Pt has not followed with any cardiologist. Has not had any syncope or other events preceding this.  (19 Dec 2023 14:54)    24 HOUR EVENTS:  - started on heparin gtt for full AC i/s/o DVTs with ptt goal 50-60    ICU Vital Signs Last 24 Hrs  T(C): 37.9 (24 Dec 2023 16:00), Max: 38.6 (24 Dec 2023 12:00)  T(F): 100.2 (24 Dec 2023 16:00), Max: 101.5 (24 Dec 2023 12:00)  HR: 60 (24 Dec 2023 20:00) (51 - 65)  ABP: 171/45 (24 Dec 2023 20:00) (90/34 - 171/45)  ABP(mean): 75 (24 Dec 2023 20:00) (49 - 83)  RR: 19 (24 Dec 2023 20:00) (15 - 28)  SpO2: 100% (24 Dec 2023 20:00) (100% - 100%)    O2 Parameters below as of 24 Dec 2023 20:00  Patient On (Oxygen Delivery Method): ventilator    O2 Concentration (%): 40    Mode: AC/ CMV (Assist Control/ Continuous Mandatory Ventilation), RR (machine): 14, TV (machine): 500, FiO2: 40, PEEP: 5, ITime: 1    I&O's Summary    23 Dec 2023 07:01  -  24 Dec 2023 07:00  --------------------------------------------------------  IN: 1793.3 mL / OUT: 1305 mL / NET: 488.3 mL    24 Dec 2023 07:01  -  24 Dec 2023 20:48  --------------------------------------------------------  IN: 659 mL / OUT: 640 mL / NET: 19 mL    CAPILLARY BLOOD GLUCOSE  POCT Blood Glucose.: 204 mg/dL (24 Dec 2023 18:00)    PHYSICAL EXAM:  GENERAL: intubated, sedated on precedex  HEAD: Traumatic, R eye hematoma s/p fall  CHEST/LUNG: CTAB; No wheezes, rales, or rhonchi  HEART: Regular rate and rhythm. Normal S1/S2. No murmurs, rubs, or gallops  ABDOMEN: Soft, non-tender, non-distended; normal bowel sounds, no organomegaly  EXTREMITIES: No clubbing, cyanosis, or edema  NEUROLOGY: intubated, currently sedated on precedex    ============================I/O===========================   I&O's Detail    23 Dec 2023 07:01  -  24 Dec 2023 07:00  --------------------------------------------------------  IN:    Dexmedetomidine: 133.3 mL    IV PiggyBack: 700 mL    Jevity 1.2: 960 mL  Total IN: 1793.3 mL    OUT:    Indwelling Catheter - Urethral (mL): 1305 mL    Voided (mL): 0 mL  Total OUT: 1305 mL    Total NET: 488.3 mL      24 Dec 2023 07:01  -  24 Dec 2023 20:48  --------------------------------------------------------  IN:    Dexmedetomidine: 91 mL    Heparin: 48 mL    Jevity 1.2: 520 mL  Total IN: 659 mL    OUT:    Indwelling Catheter - Urethral (mL): 640 mL  Total OUT: 640 mL    Total NET: 19 mL    ============================ LABS =========================                        7.0    5.82  )-----------( 99       ( 24 Dec 2023 01:04 )             21.1     12-24    145  |  111<H>  |  68<H>  ----------------------------<  182<H>  3.9   |  23  |  2.03<H>    Ca    7.9<L>      24 Dec 2023 01:04  Phos  3.7     12-24  Mg     2.7     12-24    TPro  5.5<L>  /  Alb  3.3  /  TBili  1.1  /  DBili  x   /  AST  55<H>  /  ALT  65<H>  /  AlkPhos  43  12-24    LIVER FUNCTIONS - ( 24 Dec 2023 01:04 )  Alb: 3.3 g/dL / Pro: 5.5 g/dL / ALK PHOS: 43 U/L / ALT: 65 U/L / AST: 55 U/L / GGT: x           PT/INR - ( 24 Dec 2023 01:04 )   PT: 12.0 sec;   INR: 1.09 ratio       PTT - ( 24 Dec 2023 01:04 )  PTT:30.8 sec  ABG - ( 24 Dec 2023 00:50 )  pH, Arterial: 7.45  pH, Blood: x     /  pCO2: 34    /  pO2: 183   / HCO3: 24    / Base Excess: -0.3  /  SaO2: 98.9      Blood Gas Arterial, Lactate: 0.9 mmol/L (12-24-23 @ 00:50)  Blood Gas Arterial, Lactate: 1.2 mmol/L (12-23-23 @ 00:50)  Blood Gas Arterial, Lactate: 1.4 mmol/L (12-22-23 @ 00:43)    Urinalysis Basic - ( 24 Dec 2023 01:04 )    Color: x / Appearance: x / SG: x / pH: x  Gluc: 182 mg/dL / Ketone: x  / Bili: x / Urobili: x   Blood: x / Protein: x / Nitrite: x   Leuk Esterase: x / RBC: x / WBC x   Sq Epi: x / Non Sq Epi: x / Bacteria: x    ======================Micro/Rad/Cardio=================  Telemtry: Reviewed   EKG: Reviewed  CXR: Reviewed  Culture: Reviewed   Echo:   Cath:   ======================================================  PAST MEDICAL & SURGICAL HISTORY:  HTN (hypertension)      HLD (hyperlipidemia)    ====================ASSESSMENT ==============  74M w/ PMH HTN HLD presents as transfer from [facility] s/p VT arrest s/p shock x2 w/ unknown downtime. Pt found to have trauma to head w/ superficial scalp bleeding and oral mucosal bleeding.     Plan:  ====================== NEUROLOGY=====================  #Intubated  Encephalopathy post arrest   - Intubated w/o sedation s/p cardiac arrest w/ unknown down time. Reported to be 20-40minutes but can be longer given ROSC achieved right before arrival to the hospital   - CTH at [facility] w/o intracranial bleed/pathology. CTH maybe too soon to show evidence of anoxic injury  - Pt remains w/o purposeful movement.   - Neuro and neurosurgeryfollowing  - VEEG: no epileptiform activity; diffuse cerebral dysfunction   - Repeat CTH shows stability of subdural hematoma & subarachnoid hemorrhage and emerging signs of anoxic injury  - f/u neuron specific enolase  - Family meeting w/ neurology attending 12/22 discussed no meaningful recovery. Pending family decision on comfort care.     #SAH & SDH  - Pt w/ acute SAH and SDH 2/2 trauma  - Was not initially seen in outside scan at [facility] likely performed too early for radiologic evidence  - Neurology and neurosurgery following  - NSGY: no intervention  - Repeat CTH shows stability of bleeds   - Maintain BP control   - Initially started on HSQ for DVT ppx, now on heparin gtt (started 12/24 with narrow therapeutic ptt window) for full AC given findings of DVTs  - will obtain repeat CTH once ptt therapeutic    #C-spine trauma  Pt w/ fall at home presented w/ c-collar.   -Per [facility] radiology report no evidence of cervical fracture  -Trauma surgery cleared pt of Cspine collar    #Myoclonic jerking  - vEEG: evidence of mycolonic seizures  - Started keppra load  - d/c VEEG    ==================== RESPIRATORY======================  #Intubated  Pt intubated s/p cardiac arrest  - On full vent support: RR 14 /  / PEEP 5 / FiO2 40  - Monitor ABGs    #Oropharynx bleed  - Pt w/ blood in oropharynx requiring frequent suctioning.   - ENT consulted s/p packing     ====================CARDIOVASCULAR==================  #VT arrest  - Pt s/p VT arrest w/ unknown downtime. Pt reportedly received shock x2. No prior cardiac hx per family  - Monitor for arrhythmias   - Pt not asa/plavix loaded 2/2 substernal hematoma noted on CT chest at [facility]  - Unlikely to be a candidate for [facility] at this time given mental status     #HTN  - Maintain BP control iso brain bleed  - Amlodipine; coreg  - PRN IVP labetalol for elevated SBP  - SBP goal<160    ===================HEMATOLOGIC/ONC ===================  #Substernal hematoma  - Pt w/ substernal hematoma noted on imaging at [facility] from trauma/cpr  - Repeat CT chest to monitor hematoma  - A/C: heparin gtt    #Thrombocytopenia  - Pt w/ downtrending PLT. Started downtrending prior to HSQ initiation.   - Likely iso critical illness   - Will send blue top PLT w/ next CBC    ===================== RENAL =========================  Pt presented w/ SCr 1.16, likely near baseline  - trend Cr   - montior I&Os    ==================== GASTROINTESTINAL===================  - Diet: enteral feeds   - Protonix    =======================    ENDOCRINE  =====================  #Hypothyroidism  - A1c=5.5  - monitor glucose    ========================INFECTIOUS DISEASE================  #Sepsis  - Pt w/ leukocytosis w/ fevers possibly 2/2  infection  - Pt w/ persistent fevers likely multifactorial 2/2 DVTs, underlying infection, possible ?neurogenic component  - U/A grossly positive. F/u BCx, UCx  - Pt had crash lines placed at [facility]. Possible source of infection. MRSA negative 12/20  - Sputum Cx + for klebsiella  - 12/22 BCx 1 bottle w/ MRSE. Possible contaminant. Check repeat BCx  - Broadened to Meropenem 12/24  - c/w Vanc (12/22-)      Patient requires continuous monitoring with bedside rhythm monitoring, pulse ox monitoring, and intermittent blood gas analysis. Care plan discussed with ICU care team. Patient remained critical and at risk for life threatening decompensation.  Patient seen, examined and plan discussed with CCU team during rounds.     I have personally provided 35 minutes of critical care time excluding time spent on separate procedures, in addition to initial critical care time provided by the CICU Attending, Dr. Mejia.    By signing my name below, I, Kristy Ding, attest that this documentation has been prepared under the direction and in the presence of JEANNIE Cohen.   Electronically signed: Emilia Franklin, 12-24-23 @ 20:48    I, Aziza Ross, personally performed the services described in this documentation. all medical record entries made by the rezaibe were at my direction and in my presence. I have reviewed the chart and agree that the record reflects my personal performance and is accurate and complete  Electronically signed: JEANNIE Cohen.       ASHLEY PRITCHARD  MRN-[id number]  Patient is a 74y old  Male who presents with a chief complaint of Cardiac Arrest (24 Dec 2023 07:40)    HPI:  74M w/ PMH HTN HLD presents as transfer from [facility] s/p cardiac arrest. Per reports patient had a witness fall and arrest at home. EMS performed CPR en route to hospital was noted to be in VTach and shocked x2. ROSC was obtained just prior to arrival at [facility]. Pt was intubated placed on levo gtt. On arrival pt noted to have lacerated to R forehead. Pt had CT scans that were negative for intracranial hemorrhage, C-spine fracture, facial fractures. A R frontal hematoma was noted.     On arrival patient w/ dried blood on scalp. Also noted to have bleeding from oral mucosa w/ clots requiring suctioning. Pt otherwise off sedation and non-responsive. Per EMS report pt did receive some sedation and paralytics at [facility].     Per family patient has been feeling 'off' but managing his day to day and did not see a physician. Pt prescribed klonopin by outpatient provider and family believes pt may have been taking more than prescribed. On day of arrest, pt was initially asymptomatic carrying out his routine. He went to the bathroom, wife heard a loud thud and found patient in a pool of blood prompting call to EMS. Pt has not followed with any cardiologist. Has not had any syncope or other events preceding this.  (19 Dec 2023 14:54)    24 HOUR EVENTS:  - started on heparin gtt for full AC i/s/o DVTs with ptt goal 50-60    ICU Vital Signs Last 24 Hrs  T(C): 37.9 (24 Dec 2023 16:00), Max: 38.6 (24 Dec 2023 12:00)  T(F): 100.2 (24 Dec 2023 16:00), Max: 101.5 (24 Dec 2023 12:00)  HR: 60 (24 Dec 2023 20:00) (51 - 65)  ABP: 171/45 (24 Dec 2023 20:00) (90/34 - 171/45)  ABP(mean): 75 (24 Dec 2023 20:00) (49 - 83)  RR: 19 (24 Dec 2023 20:00) (15 - 28)  SpO2: 100% (24 Dec 2023 20:00) (100% - 100%)    O2 Parameters below as of 24 Dec 2023 20:00  Patient On (Oxygen Delivery Method): ventilator    O2 Concentration (%): 40    Mode: AC/ CMV (Assist Control/ Continuous Mandatory Ventilation), RR (machine): 14, TV (machine): 500, FiO2: 40, PEEP: 5, ITime: 1    I&O's Summary    23 Dec 2023 07:01  -  24 Dec 2023 07:00  --------------------------------------------------------  IN: 1793.3 mL / OUT: 1305 mL / NET: 488.3 mL    24 Dec 2023 07:01  -  24 Dec 2023 20:48  --------------------------------------------------------  IN: 659 mL / OUT: 640 mL / NET: 19 mL    CAPILLARY BLOOD GLUCOSE  POCT Blood Glucose.: 204 mg/dL (24 Dec 2023 18:00)    PHYSICAL EXAM:  GENERAL: intubated, sedated on precedex  HEAD: Traumatic, R eye hematoma s/p fall  CHEST/LUNG: CTAB; No wheezes, rales, or rhonchi  HEART: Regular rate and rhythm. Normal S1/S2. No murmurs, rubs, or gallops  ABDOMEN: Soft, non-tender, non-distended; normal bowel sounds, no organomegaly  EXTREMITIES: No clubbing, cyanosis, or edema  NEUROLOGY: intubated, currently sedated on precedex    ============================I/O===========================   I&O's Detail    23 Dec 2023 07:01  -  24 Dec 2023 07:00  --------------------------------------------------------  IN:    Dexmedetomidine: 133.3 mL    IV PiggyBack: 700 mL    Jevity 1.2: 960 mL  Total IN: 1793.3 mL    OUT:    Indwelling Catheter - Urethral (mL): 1305 mL    Voided (mL): 0 mL  Total OUT: 1305 mL    Total NET: 488.3 mL      24 Dec 2023 07:01  -  24 Dec 2023 20:48  --------------------------------------------------------  IN:    Dexmedetomidine: 91 mL    Heparin: 48 mL    Jevity 1.2: 520 mL  Total IN: 659 mL    OUT:    Indwelling Catheter - Urethral (mL): 640 mL  Total OUT: 640 mL    Total NET: 19 mL    ============================ LABS =========================                        7.0    5.82  )-----------( 99       ( 24 Dec 2023 01:04 )             21.1     12-24    145  |  111<H>  |  68<H>  ----------------------------<  182<H>  3.9   |  23  |  2.03<H>    Ca    7.9<L>      24 Dec 2023 01:04  Phos  3.7     12-24  Mg     2.7     12-24    TPro  5.5<L>  /  Alb  3.3  /  TBili  1.1  /  DBili  x   /  AST  55<H>  /  ALT  65<H>  /  AlkPhos  43  12-24    LIVER FUNCTIONS - ( 24 Dec 2023 01:04 )  Alb: 3.3 g/dL / Pro: 5.5 g/dL / ALK PHOS: 43 U/L / ALT: 65 U/L / AST: 55 U/L / GGT: x           PT/INR - ( 24 Dec 2023 01:04 )   PT: 12.0 sec;   INR: 1.09 ratio       PTT - ( 24 Dec 2023 01:04 )  PTT:30.8 sec  ABG - ( 24 Dec 2023 00:50 )  pH, Arterial: 7.45  pH, Blood: x     /  pCO2: 34    /  pO2: 183   / HCO3: 24    / Base Excess: -0.3  /  SaO2: 98.9      Blood Gas Arterial, Lactate: 0.9 mmol/L (12-24-23 @ 00:50)  Blood Gas Arterial, Lactate: 1.2 mmol/L (12-23-23 @ 00:50)  Blood Gas Arterial, Lactate: 1.4 mmol/L (12-22-23 @ 00:43)    Urinalysis Basic - ( 24 Dec 2023 01:04 )    Color: x / Appearance: x / SG: x / pH: x  Gluc: 182 mg/dL / Ketone: x  / Bili: x / Urobili: x   Blood: x / Protein: x / Nitrite: x   Leuk Esterase: x / RBC: x / WBC x   Sq Epi: x / Non Sq Epi: x / Bacteria: x    ======================Micro/Rad/Cardio=================  Telemtry: Reviewed   EKG: Reviewed  CXR: Reviewed  Culture: Reviewed   Echo:   Cath:   ======================================================  PAST MEDICAL & SURGICAL HISTORY:  HTN (hypertension)      HLD (hyperlipidemia)    ====================ASSESSMENT ==============  74M w/ PMH HTN HLD presents as transfer from [facility] s/p VT arrest s/p shock x2 w/ unknown downtime. Pt found to have trauma to head w/ superficial scalp bleeding and oral mucosal bleeding.     Plan:  ====================== NEUROLOGY=====================  #Intubated  Encephalopathy post arrest   - Intubated w/o sedation s/p cardiac arrest w/ unknown down time. Reported to be 20-40minutes but can be longer given ROSC achieved right before arrival to the hospital   - CTH at [facility] w/o intracranial bleed/pathology. CTH maybe too soon to show evidence of anoxic injury  - Pt remains w/o purposeful movement.   - Neuro and neurosurgeryfollowing  - VEEG: no epileptiform activity; diffuse cerebral dysfunction   - Repeat CTH shows stability of subdural hematoma & subarachnoid hemorrhage and emerging signs of anoxic injury  - f/u neuron specific enolase  - Family meeting w/ neurology attending 12/22 discussed no meaningful recovery. Pending family decision on comfort care.     #SAH & SDH  - Pt w/ acute SAH and SDH 2/2 trauma  - Was not initially seen in outside scan at [facility] likely performed too early for radiologic evidence  - Neurology and neurosurgery following  - NSGY: no intervention  - Repeat CTH shows stability of bleeds   - Maintain BP control   - Initially started on HSQ for DVT ppx, now on heparin gtt (started 12/24 with narrow therapeutic ptt window) for full AC given findings of DVTs  - will obtain repeat CTH once ptt therapeutic    #C-spine trauma  Pt w/ fall at home presented w/ c-collar.   -Per [facility] radiology report no evidence of cervical fracture  -Trauma surgery cleared pt of Cspine collar    #Myoclonic jerking  - vEEG: evidence of mycolonic seizures  - Started keppra load  - d/c VEEG    ==================== RESPIRATORY======================  #Intubated  Pt intubated s/p cardiac arrest  - On full vent support: RR 14 /  / PEEP 5 / FiO2 40  - Monitor ABGs    #Oropharynx bleed  - Pt w/ blood in oropharynx requiring frequent suctioning.   - ENT consulted s/p packing     ====================CARDIOVASCULAR==================  #VT arrest  - Pt s/p VT arrest w/ unknown downtime. Pt reportedly received shock x2. No prior cardiac hx per family  - Monitor for arrhythmias   - Pt not asa/plavix loaded 2/2 substernal hematoma noted on CT chest at [facility]  - Unlikely to be a candidate for [facility] at this time given mental status     #HTN  - Maintain BP control iso brain bleed  - Amlodipine; coreg  - PRN IVP labetalol for elevated SBP  - SBP goal<160    ===================HEMATOLOGIC/ONC ===================  #Substernal hematoma  - Pt w/ substernal hematoma noted on imaging at [facility] from trauma/cpr  - Repeat CT chest to monitor hematoma  - A/C: heparin gtt    #Thrombocytopenia  - Pt w/ downtrending PLT. Started downtrending prior to HSQ initiation.   - Likely iso critical illness   - Will send blue top PLT w/ next CBC    ===================== RENAL =========================  Pt presented w/ SCr 1.16, likely near baseline  - trend Cr   - montior I&Os    ==================== GASTROINTESTINAL===================  - Diet: enteral feeds   - Protonix    =======================    ENDOCRINE  =====================  #Hypothyroidism  - A1c=5.5  - monitor glucose    ========================INFECTIOUS DISEASE================  #Sepsis  - Pt w/ leukocytosis w/ fevers possibly 2/2  infection  - Pt w/ persistent fevers likely multifactorial 2/2 DVTs, underlying infection, possible ?neurogenic component  - U/A grossly positive. F/u BCx, UCx  - Pt had crash lines placed at [facility]. Possible source of infection. MRSA negative 12/20  - Sputum Cx + for klebsiella  - 12/22 BCx 1 bottle w/ MRSE. Possible contaminant. Check repeat BCx  - Broadened to Meropenem 12/24  - c/w Vanc (12/22-)      Patient requires continuous monitoring with bedside rhythm monitoring, pulse ox monitoring, and intermittent blood gas analysis. Care plan discussed with ICU care team. Patient remained critical and at risk for life threatening decompensation.  Patient seen, examined and plan discussed with CCU team during rounds.     I have personally provided 35 minutes of critical care time excluding time spent on separate procedures, in addition to initial critical care time provided by the CICU Attending, Dr. Mejia.    By signing my name below, I, Kristy Ding, attest that this documentation has been prepared under the direction and in the presence of JEANNIE Cohen.   Electronically signed: Emilia Franklin, 12-24-23 @ 20:48    I, Aziza Ross, personally performed the services described in this documentation. all medical record entries made by the rezaibe were at my direction and in my presence. I have reviewed the chart and agree that the record reflects my personal performance and is accurate and complete  Electronically signed: JEANNIE Cohen.       cbc, bmp, hgA1c, t&s, ua, urine culture, nasal culture done

## 2024-10-18 ENCOUNTER — EMERGENCY (EMERGENCY)
Facility: HOSPITAL | Age: 79
LOS: 1 days | Discharge: HOME | End: 2024-10-18
Attending: EMERGENCY MEDICINE | Admitting: EMERGENCY MEDICINE
Payer: MEDICARE

## 2024-10-18 VITALS
OXYGEN SATURATION: 96 % | DIASTOLIC BLOOD PRESSURE: 77 MMHG | HEIGHT: 62 IN | WEIGHT: 188.94 LBS | TEMPERATURE: 98 F | HEART RATE: 84 BPM | RESPIRATION RATE: 20 BRPM | SYSTOLIC BLOOD PRESSURE: 138 MMHG

## 2024-10-18 VITALS
DIASTOLIC BLOOD PRESSURE: 80 MMHG | HEART RATE: 72 BPM | SYSTOLIC BLOOD PRESSURE: 138 MMHG | OXYGEN SATURATION: 96 % | TEMPERATURE: 98 F | RESPIRATION RATE: 18 BRPM

## 2024-10-18 DIAGNOSIS — Z90.710 ACQUIRED ABSENCE OF BOTH CERVIX AND UTERUS: Chronic | ICD-10-CM

## 2024-10-18 DIAGNOSIS — I83.91 ASYMPTOMATIC VARICOSE VEINS OF RIGHT LOWER EXTREMITY: Chronic | ICD-10-CM

## 2024-10-18 DIAGNOSIS — Z96.652 PRESENCE OF LEFT ARTIFICIAL KNEE JOINT: Chronic | ICD-10-CM

## 2024-10-18 DIAGNOSIS — Z98.51 TUBAL LIGATION STATUS: Chronic | ICD-10-CM

## 2024-10-18 LAB
ALBUMIN SERPL ELPH-MCNC: 4.4 G/DL — SIGNIFICANT CHANGE UP (ref 3.3–5)
ALP SERPL-CCNC: 61 U/L — SIGNIFICANT CHANGE UP (ref 40–120)
ALT FLD-CCNC: 22 U/L — SIGNIFICANT CHANGE UP (ref 4–33)
ANION GAP SERPL CALC-SCNC: 9 MMOL/L — SIGNIFICANT CHANGE UP (ref 7–14)
APPEARANCE UR: CLEAR — SIGNIFICANT CHANGE UP
AST SERPL-CCNC: 22 U/L — SIGNIFICANT CHANGE UP (ref 4–32)
BACTERIA # UR AUTO: ABNORMAL /HPF
BASOPHILS # BLD AUTO: 0.02 K/UL — SIGNIFICANT CHANGE UP (ref 0–0.2)
BASOPHILS NFR BLD AUTO: 0.4 % — SIGNIFICANT CHANGE UP (ref 0–2)
BILIRUB SERPL-MCNC: 0.4 MG/DL — SIGNIFICANT CHANGE UP (ref 0.2–1.2)
BILIRUB UR-MCNC: NEGATIVE — SIGNIFICANT CHANGE UP
BUN SERPL-MCNC: 20 MG/DL — SIGNIFICANT CHANGE UP (ref 7–23)
CALCIUM SERPL-MCNC: 9.6 MG/DL — SIGNIFICANT CHANGE UP (ref 8.4–10.5)
CAST: 5 /LPF — HIGH (ref 0–4)
CHLORIDE SERPL-SCNC: 104 MMOL/L — SIGNIFICANT CHANGE UP (ref 98–107)
CO2 SERPL-SCNC: 27 MMOL/L — SIGNIFICANT CHANGE UP (ref 22–31)
COLOR SPEC: YELLOW — SIGNIFICANT CHANGE UP
CREAT SERPL-MCNC: 0.53 MG/DL — SIGNIFICANT CHANGE UP (ref 0.5–1.3)
DIFF PNL FLD: NEGATIVE — SIGNIFICANT CHANGE UP
EGFR: 93 ML/MIN/1.73M2 — SIGNIFICANT CHANGE UP
EOSINOPHIL # BLD AUTO: 0.21 K/UL — SIGNIFICANT CHANGE UP (ref 0–0.5)
EOSINOPHIL NFR BLD AUTO: 4.1 % — SIGNIFICANT CHANGE UP (ref 0–6)
FLUAV AG NPH QL: SIGNIFICANT CHANGE UP
FLUBV AG NPH QL: SIGNIFICANT CHANGE UP
GLUCOSE SERPL-MCNC: 98 MG/DL — SIGNIFICANT CHANGE UP (ref 70–99)
GLUCOSE UR QL: NEGATIVE MG/DL — SIGNIFICANT CHANGE UP
HCT VFR BLD CALC: 35.8 % — SIGNIFICANT CHANGE UP (ref 34.5–45)
HGB BLD-MCNC: 11.5 G/DL — SIGNIFICANT CHANGE UP (ref 11.5–15.5)
IANC: 2.21 K/UL — SIGNIFICANT CHANGE UP (ref 1.8–7.4)
IMM GRANULOCYTES NFR BLD AUTO: 0.2 % — SIGNIFICANT CHANGE UP (ref 0–0.9)
KETONES UR-MCNC: NEGATIVE MG/DL — SIGNIFICANT CHANGE UP
LEUKOCYTE ESTERASE UR-ACNC: ABNORMAL
LIDOCAIN IGE QN: 65 U/L — HIGH (ref 7–60)
LYMPHOCYTES # BLD AUTO: 2.38 K/UL — SIGNIFICANT CHANGE UP (ref 1–3.3)
LYMPHOCYTES # BLD AUTO: 45.9 % — HIGH (ref 13–44)
MCHC RBC-ENTMCNC: 28.1 PG — SIGNIFICANT CHANGE UP (ref 27–34)
MCHC RBC-ENTMCNC: 32.1 GM/DL — SIGNIFICANT CHANGE UP (ref 32–36)
MCV RBC AUTO: 87.5 FL — SIGNIFICANT CHANGE UP (ref 80–100)
MONOCYTES # BLD AUTO: 0.35 K/UL — SIGNIFICANT CHANGE UP (ref 0–0.9)
MONOCYTES NFR BLD AUTO: 6.8 % — SIGNIFICANT CHANGE UP (ref 2–14)
NEUTROPHILS # BLD AUTO: 2.21 K/UL — SIGNIFICANT CHANGE UP (ref 1.8–7.4)
NEUTROPHILS NFR BLD AUTO: 42.6 % — LOW (ref 43–77)
NITRITE UR-MCNC: POSITIVE
NRBC # BLD: 0 /100 WBCS — SIGNIFICANT CHANGE UP (ref 0–0)
NRBC # FLD: 0 K/UL — SIGNIFICANT CHANGE UP (ref 0–0)
NT-PROBNP SERPL-SCNC: 57 PG/ML — SIGNIFICANT CHANGE UP
PH UR: 7 — SIGNIFICANT CHANGE UP (ref 5–8)
PLATELET # BLD AUTO: 253 K/UL — SIGNIFICANT CHANGE UP (ref 150–400)
POTASSIUM SERPL-MCNC: 4.1 MMOL/L — SIGNIFICANT CHANGE UP (ref 3.5–5.3)
POTASSIUM SERPL-SCNC: 4.1 MMOL/L — SIGNIFICANT CHANGE UP (ref 3.5–5.3)
PROT SERPL-MCNC: 7.7 G/DL — SIGNIFICANT CHANGE UP (ref 6–8.3)
PROT UR-MCNC: NEGATIVE MG/DL — SIGNIFICANT CHANGE UP
RBC # BLD: 4.09 M/UL — SIGNIFICANT CHANGE UP (ref 3.8–5.2)
RBC # FLD: 13.2 % — SIGNIFICANT CHANGE UP (ref 10.3–14.5)
RBC CASTS # UR COMP ASSIST: 1 /HPF — SIGNIFICANT CHANGE UP (ref 0–4)
RSV RNA NPH QL NAA+NON-PROBE: SIGNIFICANT CHANGE UP
SARS-COV-2 RNA SPEC QL NAA+PROBE: SIGNIFICANT CHANGE UP
SODIUM SERPL-SCNC: 140 MMOL/L — SIGNIFICANT CHANGE UP (ref 135–145)
SP GR SPEC: 1.01 — SIGNIFICANT CHANGE UP (ref 1–1.03)
SQUAMOUS # UR AUTO: 4 /HPF — SIGNIFICANT CHANGE UP (ref 0–5)
TROPONIN T, HIGH SENSITIVITY RESULT: 10 NG/L — SIGNIFICANT CHANGE UP
UROBILINOGEN FLD QL: 0.2 MG/DL — SIGNIFICANT CHANGE UP (ref 0.2–1)
WBC # BLD: 5.18 K/UL — SIGNIFICANT CHANGE UP (ref 3.8–10.5)
WBC # FLD AUTO: 5.18 K/UL — SIGNIFICANT CHANGE UP (ref 3.8–10.5)
WBC UR QL: 4 /HPF — SIGNIFICANT CHANGE UP (ref 0–5)

## 2024-10-18 PROCEDURE — 93010 ELECTROCARDIOGRAM REPORT: CPT

## 2024-10-18 PROCEDURE — 99284 EMERGENCY DEPT VISIT MOD MDM: CPT

## 2024-10-18 PROCEDURE — 71046 X-RAY EXAM CHEST 2 VIEWS: CPT | Mod: 26

## 2024-10-18 RX ORDER — ACETAMINOPHEN 325 MG
975 TABLET ORAL ONCE
Refills: 0 | Status: COMPLETED | OUTPATIENT
Start: 2024-10-18 | End: 2024-10-18

## 2024-10-18 RX ORDER — METOCLOPRAMIDE HCL 5 MG
10 TABLET ORAL ONCE
Refills: 0 | Status: COMPLETED | OUTPATIENT
Start: 2024-10-18 | End: 2024-10-18

## 2024-10-18 RX ORDER — SODIUM CHLORIDE 0.9 % (FLUSH) 0.9 %
1000 SYRINGE (ML) INJECTION ONCE
Refills: 0 | Status: COMPLETED | OUTPATIENT
Start: 2024-10-18 | End: 2024-10-18

## 2024-10-18 RX ORDER — CEFTRIAXONE SODIUM 1 G
1000 VIAL (EA) INJECTION ONCE
Refills: 0 | Status: COMPLETED | OUTPATIENT
Start: 2024-10-18 | End: 2024-10-18

## 2024-10-18 RX ORDER — CEFPODOXIME PROXETIL 50 MG/5 ML
1 SUSPENSION, RECONSTITUTED, ORAL (ML) ORAL
Qty: 20 | Refills: 0
Start: 2024-10-18 | End: 2024-10-27

## 2024-10-18 RX ADMIN — Medication 10 MILLIGRAM(S): at 18:40

## 2024-10-18 RX ADMIN — Medication 975 MILLIGRAM(S): at 16:49

## 2024-10-18 RX ADMIN — Medication 1000 MILLILITER(S): at 19:41

## 2024-10-18 RX ADMIN — Medication 100 MILLIGRAM(S): at 19:41

## 2024-10-18 NOTE — ED ADULT NURSE NOTE - NSFALLUNIVINTERV_ED_ALL_ED
Bed/Stretcher in lowest position, wheels locked, appropriate side rails in place/Call bell, personal items and telephone in reach/Instruct patient to call for assistance before getting out of bed/chair/stretcher/Non-slip footwear applied when patient is off stretcher/[place] to call system/Physically safe environment - no spills, clutter or unnecessary equipment/Purposeful proactive rounding/Room/bathroom lighting operational, light cord in reach

## 2024-10-18 NOTE — ED ADULT TRIAGE NOTE - CHIEF COMPLAINT QUOTE
patient states" I am having chest pain , feverish, body aches, head ache and pain to my left face, left arm and dizziness since 1 week". denies dizziness in triage. endorses to chest pain and left am pain in triage. h/o HTN, Asthma HDL.

## 2024-10-18 NOTE — ED PROVIDER NOTE - PROGRESS NOTE DETAILS
Aashish COOPER PGY1: Discussed results of blood work, urine, and imaging with patient, as well as prsrciptions, follow up, and return precautions.

## 2024-10-18 NOTE — ED ADULT NURSE NOTE - OBJECTIVE STATEMENT
Pt. is alert and oriented x 4, presenting to the ER with complaints of body aches and headache. Pt. stated " I have body aches and my head has been hurting on and off  about 2 weeks". No c/o feeling dizzy, no c/o shortness of breath, no coughing. . Labs sent. medicated as ordered. Saline lock 22 gauge place in right antecubital space. Pt. is out of bed ambulating to bathroom without difficulty.

## 2024-10-18 NOTE — ED ADULT NURSE REASSESSMENT NOTE - NS ED NURSE REASSESS COMMENT FT1
Report received from BARRY Oshea. Pt A&Ox3 sitting up in stretcher resting comfortably. Respirations even and unlabored on room air. No acute distress noted. Pt brought to restroom with wheelchair. Report received from BARRY Oshea. Pt A&Ox3 sitting up in stretcher resting comfortably. Respirations even and unlabored on room air. No acute distress noted. Pt brought to restroom with wheelchair. Denies headache, dizziness, chest pain and SOB at this time. VS as noted in flow sheet. Plan of care ongoing, comfort measures provided and safety measures maintained. Awaiting disposition.

## 2024-10-18 NOTE — ED PROVIDER NOTE - PATIENT PORTAL LINK FT
You can access the FollowMyHealth Patient Portal offered by [facility] by registering at the following website: [email or web address]. By joining [facility]’s FollowMyHealth portal, you will also be able to view your health information using other applications (apps) compatible with our system.

## 2024-10-18 NOTE — ED PROVIDER NOTE - NSFOLLOWUPINSTRUCTIONS_ED_ALL_ED_FT
You were seen in the Emergency Department for fatigue and headache. Your bloodwork and xray did not show any emergencies, but your urine was positive for an infection, which can explain your symptoms. You were prescribed _____. For pain you can take 1000mg of tylenol every 6 hours, no more than 4000mg in one day. Please follow up with your primary care doctor.     Return to the Emergency Department if you experience worsening pain not controlled with medications, fevers or chills that are worsening, significant back pain, or worsening pain with urination. You were seen in the Emergency Department for fatigue and headache. Your bloodwork and xray did not show any emergencies, but your urine was positive for an infection, which can explain your symptoms. You were prescribed cefpodoxime 200mg twice a day for 10 days. For pain you can take 1000mg of tylenol every 6 hours, no more than 4000mg in one day. Please follow up with your primary care doctor.     Return to the Emergency Department if you experience worsening pain not controlled with medications, fevers or chills that are worsening, significant back pain, or worsening pain with urination.

## 2024-10-18 NOTE — ED PROVIDER NOTE - CLINICAL SUMMARY MEDICAL DECISION MAKING FREE TEXT BOX
79 year old woman PMH asthma, HTN, HLD, GERD presenting with 3 weeks of fatigue, subjective fevers, chills and a few days of headache and chest pain, benign vitals and physical exam. r/o ACS, PNA, CHF, URI, UTI, pancreatitis, unlikely abdominal pathology (cholecystitis, appendicitis, diverticulitis), neck and shoulder pain most likely MSK / radicular, unlikely stroke / ICH. CBC, CMP, trop, bnp, lipase, CXR, flu/cpvod. 79 year old woman PMH asthma, HTN, HLD, GERD presenting with 3 weeks of fatigue, subjective fevers, chills and a few days of headache and chest pain, benign vitals and physical exam. r/o ACS, PNA, CHF, URI, UTI, pancreatitis, unlikely abdominal pathology (cholecystitis, appendicitis, diverticulitis), neck and shoulder pain most likely MSK / radicular, unlikely stroke / ICH. CBC, CMP, trop, bnp, lipase, CXR, flu/cpvod.  Relevant EMR reviewed.

## 2024-10-18 NOTE — ED PROVIDER NOTE - PHYSICAL EXAMINATION
Physical Exam:  Gen: no acute distress, AOx3, nontoxic appearing  Head: NCAT  HEENT: EOMI, PEERLA, normal conjunctiva, tongue midline, oral mucosa moist  Lung: CTAB, no respiratory distress, no wheezes/rhonchi/rales B/L, speaking in full sentences  CV: RRR, no murmurs, rubs or gallops  Abd: soft, mild epigastric tenderness, ND, no guarding, no rigidity, no rebound tenderness, no CVA tenderness  MSK: no visible deformities, ROM normal in UE/LE, no neck / back pain, mild left calf tenderness.   Neuro: No focal sensory or motor deficits  Skin: Warm, well perfused, no rash, no leg swelling Physical Exam:  Gen: no acute distress, AOx3, nontoxic appearing  Head: NCAT  HEENT: EOMI, PEERLA, normal conjunctiva, tongue midline, oral mucosa moist  Lung: CTAB, no respiratory distress, no wheezes/rhonchi/rales B/L, speaking in full sentences  CV: RRR, no murmurs, rubs or gallops  Abd: soft, mild epigastric tenderness, ND, no guarding, no rigidity, no rebound tenderness, no CVA tenderness  MSK: no visible deformities, ROM normal in UE/LE, no neck / back pain, mild left calf tenderness.   Neuro: No focal sensory or motor deficits  Skin: Warm, well perfused, no rash, no leg swelling    Attending/Al[place]: NAD; PERRL/EOMI, non-icterus, supple, no FREDRICK, no JVD, RRR, CTAB; Abd-soft, NT/ND, no HSM; no LE edema, A&Ox3, nonfocal; Skin-warm/dry

## 2024-10-18 NOTE — ED PROVIDER NOTE - OBJECTIVE STATEMENT
79 year old woman PMH asthma, HTN, HLD, GERD presenting with 3 weeks of fatigue, subjective fevers, chills and a few days of headache and chest pain. She has been feeling unwell for 3 weeks, taking tylenol without relief. She is feeling chills and subjective fever, reports mild dry cough, no dysuria. She has also had some epigastric abdominal pain without N/V/D/C. denies dysuria. She also reports some chest pain starting 3 days ago, does not know if it is worse with exertion, denies SOB. Also reports headahce and left arm and neck pain and numbness. 79 year old woman PMH asthma, HTN, HLD, GERD presenting with 3 weeks of fatigue, subjective fevers, chills and a few days of headache and chest pain. She has been feeling unwell for 3 weeks, taking tylenol without relief. She is feeling chills and subjective fever, reports mild dry cough, no dysuria. She has also had some epigastric abdominal pain without N/V/D/C. denies dysuria. She also reports some chest pain starting 3 days ago, does not know if it is worse with exertion, denies SOB. Also reports headahce and left arm and neck pain and numbness.    Attending/[place]: 80 yo F h/o HTN, asthma, UTIs, as described above, p/w 3 weeks of generalized weakness, fatigue, subjective fever/chills, and 1 to 2 days of nonexertional chest pain that is not associated with shortness of breath, palpitations, nausea/vomiting,

## 2025-02-15 ENCOUNTER — EMERGENCY (EMERGENCY)
Facility: HOSPITAL | Age: 80
LOS: 1 days | Discharge: HOME | End: 2025-02-15
Attending: STUDENT IN AN ORGANIZED HEALTH CARE EDUCATION/TRAINING PROGRAM | Admitting: STUDENT IN AN ORGANIZED HEALTH CARE EDUCATION/TRAINING PROGRAM
Payer: MEDICARE

## 2025-02-15 VITALS
WEIGHT: 188.94 LBS | SYSTOLIC BLOOD PRESSURE: 156 MMHG | DIASTOLIC BLOOD PRESSURE: 79 MMHG | OXYGEN SATURATION: 95 % | HEIGHT: 62 IN | HEART RATE: 80 BPM | TEMPERATURE: 98 F | RESPIRATION RATE: 16 BRPM

## 2025-02-15 DIAGNOSIS — Z98.51 TUBAL LIGATION STATUS: Chronic | ICD-10-CM

## 2025-02-15 DIAGNOSIS — I83.91 ASYMPTOMATIC VARICOSE VEINS OF RIGHT LOWER EXTREMITY: Chronic | ICD-10-CM

## 2025-02-15 DIAGNOSIS — Z90.710 ACQUIRED ABSENCE OF BOTH CERVIX AND UTERUS: Chronic | ICD-10-CM

## 2025-02-15 DIAGNOSIS — Z96.652 PRESENCE OF LEFT ARTIFICIAL KNEE JOINT: Chronic | ICD-10-CM

## 2025-02-15 LAB
ALBUMIN SERPL ELPH-MCNC: 4.3 G/DL — SIGNIFICANT CHANGE UP (ref 3.3–5)
ALP SERPL-CCNC: 49 U/L — SIGNIFICANT CHANGE UP (ref 40–120)
ALT FLD-CCNC: 18 U/L — SIGNIFICANT CHANGE UP (ref 4–33)
ANION GAP SERPL CALC-SCNC: 15 MMOL/L — HIGH (ref 7–14)
APPEARANCE UR: CLEAR — SIGNIFICANT CHANGE UP
APTT BLD: 30.4 SEC — SIGNIFICANT CHANGE UP (ref 24.5–35.6)
AST SERPL-CCNC: 35 U/L — HIGH (ref 4–32)
BACTERIA # UR AUTO: ABNORMAL /HPF
BASOPHILS # BLD AUTO: 0.02 K/UL — SIGNIFICANT CHANGE UP (ref 0–0.2)
BASOPHILS NFR BLD AUTO: 0.4 % — SIGNIFICANT CHANGE UP (ref 0–2)
BILIRUB SERPL-MCNC: 0.3 MG/DL — SIGNIFICANT CHANGE UP (ref 0.2–1.2)
BILIRUB UR-MCNC: NEGATIVE — SIGNIFICANT CHANGE UP
BUN SERPL-MCNC: 17 MG/DL — SIGNIFICANT CHANGE UP (ref 7–23)
CALCIUM SERPL-MCNC: 9.2 MG/DL — SIGNIFICANT CHANGE UP (ref 8.4–10.5)
CAST: 0 /LPF — SIGNIFICANT CHANGE UP (ref 0–4)
CHLORIDE SERPL-SCNC: 100 MMOL/L — SIGNIFICANT CHANGE UP (ref 98–107)
CO2 SERPL-SCNC: 22 MMOL/L — SIGNIFICANT CHANGE UP (ref 22–31)
COLOR SPEC: YELLOW — SIGNIFICANT CHANGE UP
CREAT SERPL-MCNC: 0.51 MG/DL — SIGNIFICANT CHANGE UP (ref 0.5–1.3)
DIFF PNL FLD: NEGATIVE — SIGNIFICANT CHANGE UP
EGFR: 94 ML/MIN/1.73M2 — SIGNIFICANT CHANGE UP
EOSINOPHIL # BLD AUTO: 0.14 K/UL — SIGNIFICANT CHANGE UP (ref 0–0.5)
EOSINOPHIL NFR BLD AUTO: 2.6 % — SIGNIFICANT CHANGE UP (ref 0–6)
EPI CELLS # UR: PRESENT
GLUCOSE SERPL-MCNC: 116 MG/DL — HIGH (ref 70–99)
GLUCOSE UR QL: NEGATIVE MG/DL — SIGNIFICANT CHANGE UP
HCT VFR BLD CALC: 36.7 % — SIGNIFICANT CHANGE UP (ref 34.5–45)
HGB BLD-MCNC: 11.8 G/DL — SIGNIFICANT CHANGE UP (ref 11.5–15.5)
IANC: 2.17 K/UL — SIGNIFICANT CHANGE UP (ref 1.8–7.4)
IMM GRANULOCYTES NFR BLD AUTO: 0.2 % — SIGNIFICANT CHANGE UP (ref 0–0.9)
INR BLD: <0.9 RATIO — SIGNIFICANT CHANGE UP (ref 0.85–1.16)
KETONES UR-MCNC: ABNORMAL MG/DL
LEUKOCYTE ESTERASE UR-ACNC: ABNORMAL
LIDOCAIN IGE QN: 47 U/L — SIGNIFICANT CHANGE UP (ref 7–60)
LYMPHOCYTES # BLD AUTO: 2.68 K/UL — SIGNIFICANT CHANGE UP (ref 1–3.3)
LYMPHOCYTES # BLD AUTO: 49.9 % — HIGH (ref 13–44)
MCHC RBC-ENTMCNC: 28.5 PG — SIGNIFICANT CHANGE UP (ref 27–34)
MCHC RBC-ENTMCNC: 32.2 G/DL — SIGNIFICANT CHANGE UP (ref 32–36)
MCV RBC AUTO: 88.6 FL — SIGNIFICANT CHANGE UP (ref 80–100)
MONOCYTES # BLD AUTO: 0.35 K/UL — SIGNIFICANT CHANGE UP (ref 0–0.9)
MONOCYTES NFR BLD AUTO: 6.5 % — SIGNIFICANT CHANGE UP (ref 2–14)
NEUTROPHILS # BLD AUTO: 2.17 K/UL — SIGNIFICANT CHANGE UP (ref 1.8–7.4)
NEUTROPHILS NFR BLD AUTO: 40.4 % — LOW (ref 43–77)
NITRITE UR-MCNC: NEGATIVE — SIGNIFICANT CHANGE UP
NRBC # BLD AUTO: 0 K/UL — SIGNIFICANT CHANGE UP (ref 0–0)
NRBC # FLD: 0 K/UL — SIGNIFICANT CHANGE UP (ref 0–0)
NRBC BLD AUTO-RTO: 0 /100 WBCS — SIGNIFICANT CHANGE UP (ref 0–0)
NT-PROBNP SERPL-SCNC: 38 PG/ML — SIGNIFICANT CHANGE UP
PH UR: 6.5 — SIGNIFICANT CHANGE UP (ref 5–8)
PLATELET # BLD AUTO: 262 K/UL — SIGNIFICANT CHANGE UP (ref 150–400)
POTASSIUM SERPL-MCNC: 4.3 MMOL/L — SIGNIFICANT CHANGE UP (ref 3.5–5.3)
POTASSIUM SERPL-SCNC: 4.3 MMOL/L — SIGNIFICANT CHANGE UP (ref 3.5–5.3)
PROT SERPL-MCNC: 7.2 G/DL — SIGNIFICANT CHANGE UP (ref 6–8.3)
PROT UR-MCNC: SIGNIFICANT CHANGE UP MG/DL
PROTHROM AB SERPL-ACNC: 10.6 SEC — SIGNIFICANT CHANGE UP (ref 9.9–13.4)
RBC # BLD: 4.14 M/UL — SIGNIFICANT CHANGE UP (ref 3.8–5.2)
RBC # FLD: 13.6 % — SIGNIFICANT CHANGE UP (ref 10.3–14.5)
RBC CASTS # UR COMP ASSIST: 4 /HPF — SIGNIFICANT CHANGE UP (ref 0–4)
SODIUM SERPL-SCNC: 137 MMOL/L — SIGNIFICANT CHANGE UP (ref 135–145)
SP GR SPEC: 1.02 — SIGNIFICANT CHANGE UP (ref 1–1.03)
SQUAMOUS # UR AUTO: 5 /HPF — SIGNIFICANT CHANGE UP (ref 0–5)
TROPONIN T, HIGH SENSITIVITY RESULT: 10 NG/L — SIGNIFICANT CHANGE UP
UROBILINOGEN FLD QL: 1 MG/DL — SIGNIFICANT CHANGE UP (ref 0.2–1)
WBC # BLD: 5.37 K/UL — SIGNIFICANT CHANGE UP (ref 3.8–10.5)
WBC # FLD AUTO: 5.37 K/UL — SIGNIFICANT CHANGE UP (ref 3.8–10.5)
WBC UR QL: 1 /HPF — SIGNIFICANT CHANGE UP (ref 0–5)

## 2025-02-15 PROCEDURE — 93010 ELECTROCARDIOGRAM REPORT: CPT

## 2025-02-15 PROCEDURE — 93971 EXTREMITY STUDY: CPT | Mod: 26,LT

## 2025-02-15 PROCEDURE — 99284 EMERGENCY DEPT VISIT MOD MDM: CPT

## 2025-02-15 PROCEDURE — 71045 X-RAY EXAM CHEST 1 VIEW: CPT | Mod: 26

## 2025-02-15 RX ORDER — ACETAMINOPHEN 160 MG/5ML
1000 SUSPENSION ORAL ONCE
Refills: 0 | Status: COMPLETED | OUTPATIENT
Start: 2025-02-15 | End: 2025-02-15

## 2025-02-15 RX ADMIN — ACETAMINOPHEN 400 MILLIGRAM(S): 160 SUSPENSION ORAL at 23:10

## 2025-02-15 NOTE — ED ADULT NURSE NOTE - OBJECTIVE STATEMENT
pt received to 26A, A&Ox4, hx of HTN, asthma and depression, coming to ED c/o headache, dizziness and midsternal chest pain radiating to the back. pt unable to describe quality of pain. Pt reports pain has gotten worse over last few days. Denies SOB, N/V, blurry vision, fever, chills or urinary symptoms. RR even and unlabored on RA. placed on cardiac monitor noted to be NSR. abdomen soft nontender and nondistended. no neuro deficits noted. skin clean dry and intact. 20 G IV placed to right AC. labs drawn and sent. medicated as ordered. safety maintained. pending imaging and lab results.

## 2025-02-15 NOTE — ED PROVIDER NOTE - PHYSICAL EXAMINATION
Physical Exam:  Gen: NAD, AOx3, non-toxic appearing, able to ambulate without assistance  Head: NCAT  HEENT: EOMI, PEERLA, normal conjunctiva, tongue midline, oral mucosa moist  Lung: CTAB, no respiratory distress, no wheezes/rhonchi/rales B/L, speaking in full sentences  CV: RRR, no murmurs, rubs or gallops  Abd: soft, NT, ND, no guarding, no rigidity, no rebound tenderness, no CVA tenderness   MSK: CN II-XII intact, no midline c/t/l spine, 4/5 motor strength LUE and LLE, sensation intact to light touch b/l UE and LE   Neuro: No focal sensory or motor deficits  Skin: Warm, well perfused, no rash, no leg swelling  Psych: normal affect, calm

## 2025-02-15 NOTE — ED PROVIDER NOTE - ATTENDING CONTRIBUTION TO CARE
Attending MD Huffman: I have seen and examined this patient and fully participated in the care of this patient as the teaching attending. I personally made/approved the management plan and take responsibility for the patient management.    Patient is an 79-year-old female with history of hypertension, GERD, asthma presenting with 4 days of diffuse body pain.  Patient states she has chest pain leg pain, arm pain, headache.  No acute onset headache, gradually worsening.  No history of similar headaches in the past.  The pain has been making it difficult for her to walk.  No falls or injuries.  Son at bedside states that patient's left lower extremity has been getting more swollen than her right.  No history of PE or DVT.  No ACS workup in a few years.  Chest pain is not exertional.  No associated nausea, vomiting, diaphoresis.  No vision changes, lightheadedness, dizziness.  Has not the ED for her symptoms.  No anticoagulation use, no fevers, chills as well.  Patient hemodynamically stable.  Exam with cranial 2-12 grossly intact.  On initial exam patient with slight weakness in the left upper and lower extremity however when encouraged patient able to range extremity and strength was adequate.  No sensory deficits.  Will get CT head to evaluate for bleed, mass, CVA though less likely.  Will get labs evaluate for ACS, metabolic derangements.  Given lower extremity edema will get ultrasound to evaluate for DVT.  Disposition pending labs and imaging..       *The above represents an initial assessment/impression. Please refer to progress notes for potential changes in patient clinical course*

## 2025-02-15 NOTE — ED ADULT TRIAGE NOTE - CHIEF COMPLAINT QUOTE
c/o left-sided chest pain X few days. says has "all over body pain". denies any known cardiac history. Phx HTN, HLD.

## 2025-02-15 NOTE — ED ADULT NURSE NOTE - NSFALLUNIVINTERV_ED_ALL_ED
Bed/Stretcher in lowest position, wheels locked, appropriate side rails in place/Call bell, personal items and telephone in reach/Instruct patient to call for assistance before getting out of bed/chair/stretcher/Non-slip footwear applied when patient is off stretcher/[place] to call system/Physically safe environment - no spills, clutter or unnecessary equipment/Purposeful proactive rounding/Room/bathroom lighting operational, light cord in reach

## 2025-02-15 NOTE — ED PROVIDER NOTE - OBJECTIVE STATEMENT
79-year-old female past medical history hypertension, GERD, asthma presenting with 4 days of multiple medical complaints.  Patient states headache frontally located, not throbbing, not associated with nausea vomiting.  No visual changes or tearing.  Patient additionally endorsing left upper extremity and left lower extremity pain.  Patient states she came to hospital today because of difficulty ambulating secondary to foot pain.  Patient denies traumas or falls.  Patient denies anticoagulation use.  Endorsing chills, diffuse bodyaches, denies fever, dysuria.

## 2025-02-15 NOTE — ED PROVIDER NOTE - CLINICAL SUMMARY MEDICAL DECISION MAKING FREE TEXT BOX
79-year-old female past medical history hypertension, GERD, asthma presenting with 4 days of multiple medical complaints.  Patient states headache frontally located, not throbbing, not associated with nausea vomiting.  No visual changes or tearing.  Patient additionally endorsing left upper extremity and left lower extremity pain.  Patient states she came to hospital today because of difficulty ambulating secondary to foot pain. On arrival to emergency department patient is normotensive, heart rate 93, nonfebrile, saturating 98% on room air.  Patient is accompanied by her son at bedside.  She is answering questions appropriately, ANO x 3.  Pupils equal round and reactive to light, CN II to XII intact, no midline C/T/L-spine tenderness to palpation.  4-5 motor strength bilateral left upper extremity and lower extremity, likely secondary to pain, 5 out of 5 motor strength right upper extremity and lower extremity.  Sensation intact to light touch bilateral upper extremities and lower extremities.  Abdomen soft nontender nondistended.  Lungs clear to auscultation bilateral without wheezing rales or rhonchi, no cardiac murmurs.  No overlying skin lesions or rashes.  Differential includes migraine headache, tension headache, less likely ICH, flu COVID, pneumonia, UTI, electrolyte abnormality, anemia, atypical ACS.  Will order basic labs including CBC, CMP, troponin, proBNP, UA, flu COVID swab, CT head Noncon, ultrasound duplex and chest x-ray.  Dispo pending results.

## 2025-02-15 NOTE — ED PROVIDER NOTE - NSFOLLOWUPINSTRUCTIONS_ED_ALL_ED_FT
Today you were evaluated at [facility] ED for headache, leg and arm weakness, chest pain and back pain.    While you were here we preformed, blood work, urine studies, CT of your head, ultrasound of your lower extremity.    Incidental findings:  -Mild chronic ischemic changes in the frontoparietal white matter and unchanged dilated perivascular space in the right inferior basal ganglia.    For leg pain we recommend:  - Tylenol 650mg every 6 hours as needed  - Compression socks  - Elevation    We recommend that you follow up with your primary care provider within the next 48-72 hours    Return for difficulty breathing, chest pain, inability to walk, inability to move arm/leg.

## 2025-02-15 NOTE — ED PROVIDER NOTE - PROGRESS NOTE DETAILS
Nathaly OLSON, PGY-2;  Results of CBC and CMP nonactionable.  Urine with no sign of infection.  Flu COVID-negative.  CT head with mild chronic ischemic changes in the frontoparietal region.  No evidence of hemorrhage or mass effect.  Venous duplex of lower extremity without signs of blood clot.  Clear lungs on chest x-ray no pulmonary infiltrates or effusions.  Discussed all results with patient, recommending pain control for arm and leg pain.  Tylenol 650 mg every 6, patient is agreeable to pain.

## 2025-02-15 NOTE — ED PROVIDER NOTE - PATIENT PORTAL LINK FT
You can access the FollowMyHealth Patient Portal offered by [facility] by registering at the following website: [email or web address]. By joining [employer]’s FollowMyHealth portal, you will also be able to view your health information using other applications (apps) compatible with our system.

## 2025-02-16 VITALS
HEART RATE: 79 BPM | OXYGEN SATURATION: 97 % | SYSTOLIC BLOOD PRESSURE: 136 MMHG | RESPIRATION RATE: 18 BRPM | TEMPERATURE: 98 F | DIASTOLIC BLOOD PRESSURE: 75 MMHG

## 2025-02-16 LAB
FLUAV AG NPH QL: SIGNIFICANT CHANGE UP
FLUBV AG NPH QL: SIGNIFICANT CHANGE UP
RSV RNA NPH QL NAA+NON-PROBE: SIGNIFICANT CHANGE UP
SARS-COV-2 RNA SPEC QL NAA+PROBE: SIGNIFICANT CHANGE UP

## 2025-02-16 PROCEDURE — 70450 CT HEAD/BRAIN W/O DYE: CPT | Mod: 26

## 2025-02-16 NOTE — ED ADULT NURSE REASSESSMENT NOTE - NS ED NURSE REASSESS COMMENT FT1
break coverage rn: report received from primary nurse Myriam.  Pt is A&Ox4,  ambulatory with asst. neuro/sensory intact. airway patent, speaking clearly in full sentences. breathing is even and unlabored, + chest rise and fall. spontaneous movement of all extremities.  IV, +blood return, flushes without difficulty.  denies SOB, headache, abdominal pain, n/v/d, urinary symptoms, fevers/chills, numbness/tingling.  comfort measures provided. stretcher set in lowest position, call bell within reach, safety maintained.

## 2025-02-18 LAB
-  AMPICILLIN: SIGNIFICANT CHANGE UP
-  CIPROFLOXACIN: SIGNIFICANT CHANGE UP
-  LEVOFLOXACIN: SIGNIFICANT CHANGE UP
-  NITROFURANTOIN: SIGNIFICANT CHANGE UP
-  TETRACYCLINE: SIGNIFICANT CHANGE UP
-  VANCOMYCIN: SIGNIFICANT CHANGE UP
CULTURE RESULTS: ABNORMAL
METHOD TYPE: SIGNIFICANT CHANGE UP
ORGANISM # SPEC MICROSCOPIC CNT: ABNORMAL
ORGANISM # SPEC MICROSCOPIC CNT: ABNORMAL
SPECIMEN SOURCE: SIGNIFICANT CHANGE UP

## 2025-02-19 RX ORDER — CIPROFLOXACIN HCL 500 MG
1 TABLET ORAL
Qty: 14 | Refills: 0
Start: 2025-02-19 | End: 2025-02-25

## 2025-03-05 NOTE — ED ADULT NURSE REASSESSMENT NOTE - NS ED NURSE REASSESS COMMENT FT1
Continued Stay SW/CM Assessment/Plan of Care Note       Active Substitute Decision Maker (SDM)    There are no active Substitute Decision Maker (SDM) on file.           Progress note:  CM received call from [facility]- they have a bed and are able to accept patient today. CM spoke with patient at bedside and informed her of the above.   [facility] and asked what id the plan for vac that has been delivered. Response pending.     See SW/MARCELA flowsheets for other objective data.    Disposition Recommendations:  Preliminary discharge destination: Planned Discharge Destination: Home with services/support  SW/MARCELA recommendation for discharge: Home care, Home therapy, Pharmacy IV and Enteral, OP therapy, DME    Destination Pharmacy:        Discharge Plan/Needs:     Continued Care and Services - Admitted Since 2/24/2025       Durable Medical Equipment Coordination complete.      Service Provider Request Status Selected Services Address Phone Fax    [facility]  Selected Durable Medical Equipment [address], [facility] [id number] [phone]                      Devices/ Equipment that need to be arranged for discharge: None at this time, Wound care supplies   Accepted   Pending insurance authorization   Others:    Anticipated date of DME availability:     Prior To Hospitalization:    Living Situation: Alone and residing at Other (comment) ([facility])    .  Support Systems: Children, Family members, Home Care Staff   Home Devices/Equipment: Shower chair, Mobility assist device            Mobility Assist Devices: Standard walker, Other (comment) (Transport Wheelchair, Walker with 2 wheels, OT items for dressing, bathing)   Type of Service Prior to Hospitalization: Home health aide, Homemaker (Patient has private duty RN and Homemaker services)               Patient/Family discharge goal (s):  Home Care, Home therapy, Sub-acute nursing  Report received from BARRY Burciaga. Pt A&ox3 sitting up in stretcher resting comfortably. Respirations even and unlabored on room air. No acute distress noted. Denies headache, dizziness, chest pain and SOB at this time. Pt remains on continuous cardiac monitor, NSR noted. Plan of care ongoing, comfort measures provided and safety measures maintained. Awaiting CT. home     Resources provided:           Prior Function     Transfers: Modified Independent (02/25/25 0842 : Taylor Navarrete PT)  Ambulation in the Home: Modified  Independent (02/25/25 0842 : Taylor Navarrete PT)  Ambulation in the Community: Modified Independent (02/25/25 0842 : Taylor Navarrete PT)    Current Function  Last Filed Values         Value Time User    Current Function  slightly below baseline level of function 3/4/2025  2:43 PM Taylor Navarrete PT    Therapy Impairments  activity tolerance; strength; pain 3/4/2025  2:43 PM Taylor Navarrete PT    ADLs Requiring Support  bed mobility; transfers; ambulation; stairs 3/4/2025  2:43 PM Taylor Navarrete PT            Therapy Recommendations for Discharge:   PT:      Last Filed Values         Value Time User    PT Discharge Needs  therapy 1-3 times per week 3/4/2025  2:43 PM Taylor Navarrete PT          OT:       Last Filed Values         Value Time User    OT Discharge Needs  therapy 1-3 times per week 3/4/2025 10:54 AM China Peace OT          SLP:    Last Filed Values       None            Mobility Equipment Recommended for Discharge: none, pt owns RW      Barriers to Discharge  Identified Barriers to Discharge/Transition Planning: Medical necessity for acute care, Consult Pending/Clearance DME Pending, Other (Home Health Agency) Family/Patient Education

## 2025-04-29 NOTE — PATIENT PROFILE ADULT - DO YOU FEEL UNSAFE AT SCHOOL?
Subjective:      Kisha Ladd is a 54 y.o. right handed female who presents for left shoulder and trapezius pain. She is referred by [facility] ER. She reports onset has been over the past month. She denies any trauma. She states that the pain has progressed and now is having radicular pain in her lower left arm. She has been to the ER twice on 04/09/25 and 04/20/25. She has been treated with Nsaids, muscle relaxers, po steroids and previous radiographs were negative. She states that she has been wearing a sling for comfort. She reports no relief with any of the prior treatments.     She presents and rates her pain as 10/10. Pain is in the posterior shoulder but mainly in the left trapezius. She reports mild discomfort in her neck. She states that she has a previous history of neck pain. She reports having a previous cervical RFA done. She has increased pain with arm and neck rotation. Symptoms are aggravated by ADL's. She reports upper extremity numbness and tingling into the left forearm and hand.      Medical History:  Past Medical History:   Diagnosis Date    Bipolar depression     Colon cancer screening 05/2024    Special screening for malignant neoplasms, colon 5/14/2024    Stroke 12/10/2022    Wears dentures     full       Surgical History:  Past Surgical History:   Procedure Laterality Date    COLONOSCOPY, WITH POLYPECTOMY USING SNARE N/A 5/14/2024    Procedure: COLONOSCOPY, WITH POLYPECTOMY USING SNARE;  Surgeon: Michelle Molina MD;  Location: [facility];  Service: General;  Laterality: N/A;  6th 0930    GALLBLADDER SURGERY      HYSTERECTOMY      OOPHORECTOMY      tonsilectomy         Family History:  Family History   Problem Relation Name Age of Onset    Alzheimer's disease Mother          5 stents    Diabetes Mother      Heart defect Mother      Kidney failure Father      Heart disease Father      No Known Problems Sister      No Known Problems Sister      No Known Problems Brother      Heart disease  Brother          cabg    No Known Problems Maternal Grandmother      No Known Problems Maternal Grandfather      No Known Problems Paternal Grandmother      No Known Problems Paternal Grandfather      No Known Problems Daughter      No Known Problems Maternal Aunt      No Known Problems Maternal Uncle      No Known Problems Paternal Aunt      No Known Problems Paternal Uncle      No Known Problems Other      Breast cancer Neg Hx      Ovarian cancer Neg Hx      BRCA 1/2 Neg Hx         Allergies:   Review of patient's allergies indicates:   Allergen Reactions    Sulfa (sulfonamide antibiotics) Blisters    Codeine phosphate Hallucinations    Diclofenac      S/s cva    Bupropion hcl Rash     Review of Systems   Constitutional: Negative.  Negative for fever.   Musculoskeletal: LT shoulder pain  Skin: Negative.    Neurological: Negative.    Psychiatric/Behavioral: Depression  All other systems reviewed and are negative.      Objective:   NVI  General:  alert, appears stated age, and cooperative   Gait:  Normal.      Left  Shoulder  Bruising:   absent   Crepitus:  absent   Joint Tenderness:  Posterior shoulder and trapezius   Active ROM:   Mildly decreased due to pain/guarding   Neer:   Positive- mild   Wilkinson  negative   Speeds negative   Cross arm negative   Empty can guarded   Drop arm Negative   Stability:   normal   Apprehension Sign:   negative   Atrophy:   none noted   Strength:  biceps 5/5, triceps 5/5, abduction 4/5, adduction 5/5      Neck exam:   Marked TTP left trapezius and mildly over the posterior neck.   Neg spurlings.   AROM was mildly decreased.   Mild LT hand grasp weakness.     Imaging  X-Ray: LT shoulder reviewed from 04/20/25  No fracture or dislocation. Unremarkable soft tissues.      Assessment:      LT shoulder pain  LT Trapezius spasm  Suspected cervical radicular pain- LT upper extremity     Plan:      Previous radiographs reviewed and without findings.   Referral made to neurology for suspected  cervical radiculitis.  Ice and over head rest.   Rx- Flexeril QHS prn. Rx- Hydrocodone 5/325 mg prn pain. Reviewed dosing and side effects.   Start directed physician guided exercises for ROM and strengthening- [facility] shoulder conditioning packet given with exercises reviewed. HEP 53542 - She was instructed and demonstrated exercises per [facility] shoulder rehab exercises for HEP which include Regimen included- Codmans, cross arm stretch, passive IR, Passive ER, sleeper stretch, standing row, ER with arm abducted, elbow flexion and elbow extension, trapezius strengthening, scapula setting and scapula retraction/protraction. Reviewed with pt for 15 mins. Will be done 3 x week x 6 weeks.    RTC post neurology appointment if shoulder pain continues.  She had no further questions.    No no

## 2025-08-25 ENCOUNTER — NON-APPOINTMENT (OUTPATIENT)
Age: 80
End: 2025-08-25

## 2025-08-25 ENCOUNTER — APPOINTMENT (OUTPATIENT)
Dept: SURGERY | Facility: CLINIC | Age: 80
End: 2025-08-25
Payer: MEDICARE

## 2025-08-25 VITALS
HEIGHT: 67 IN | BODY MASS INDEX: 27.94 KG/M2 | WEIGHT: 178 LBS | SYSTOLIC BLOOD PRESSURE: 124 MMHG | DIASTOLIC BLOOD PRESSURE: 85 MMHG | HEART RATE: 71 BPM

## 2025-08-25 PROCEDURE — 99203 OFFICE O/P NEW LOW 30 MIN: CPT

## 2025-09-11 ENCOUNTER — RESULT REVIEW (OUTPATIENT)
Age: 80
End: 2025-09-11

## 2025-09-11 ENCOUNTER — APPOINTMENT (OUTPATIENT)
Dept: CT IMAGING | Facility: CLINIC | Age: 80
End: 2025-09-11

## 2025-09-11 PROCEDURE — 74177 CT ABD & PELVIS W/CONTRAST: CPT
